# Patient Record
Sex: FEMALE | Race: WHITE | HISPANIC OR LATINO | Employment: OTHER | URBAN - METROPOLITAN AREA
[De-identification: names, ages, dates, MRNs, and addresses within clinical notes are randomized per-mention and may not be internally consistent; named-entity substitution may affect disease eponyms.]

---

## 2023-10-24 ENCOUNTER — HOSPITAL ENCOUNTER (OUTPATIENT)
Dept: RADIOLOGY | Facility: EXTERNAL LOCATION | Age: 68
Discharge: HOME | End: 2023-10-24

## 2023-10-24 ENCOUNTER — OFFICE VISIT (OUTPATIENT)
Dept: PRIMARY CARE | Facility: CLINIC | Age: 68
End: 2023-10-24
Payer: MEDICARE

## 2023-10-24 VITALS
HEIGHT: 64 IN | DIASTOLIC BLOOD PRESSURE: 67 MMHG | SYSTOLIC BLOOD PRESSURE: 109 MMHG | TEMPERATURE: 96.9 F | HEART RATE: 83 BPM | OXYGEN SATURATION: 93 % | WEIGHT: 176.6 LBS | BODY MASS INDEX: 30.15 KG/M2

## 2023-10-24 DIAGNOSIS — C54.1 ENDOMETRIAL CANCER (MULTI): ICD-10-CM

## 2023-10-24 DIAGNOSIS — R63.4 WEIGHT LOSS: ICD-10-CM

## 2023-10-24 DIAGNOSIS — E78.2 MIXED HYPERLIPIDEMIA: ICD-10-CM

## 2023-10-24 DIAGNOSIS — Z12.31 BREAST CANCER SCREENING BY MAMMOGRAM: ICD-10-CM

## 2023-10-24 DIAGNOSIS — R04.0 EPISTAXIS: Primary | ICD-10-CM

## 2023-10-24 DIAGNOSIS — R04.0 EPISTAXIS, RECURRENT: ICD-10-CM

## 2023-10-24 DIAGNOSIS — Z00.00 GENERAL MEDICAL EXAM: ICD-10-CM

## 2023-10-24 DIAGNOSIS — F41.9 ANXIETY: ICD-10-CM

## 2023-10-24 DIAGNOSIS — Z23 ENCOUNTER FOR IMMUNIZATION: ICD-10-CM

## 2023-10-24 DIAGNOSIS — K21.9 GASTROESOPHAGEAL REFLUX DISEASE, UNSPECIFIED WHETHER ESOPHAGITIS PRESENT: ICD-10-CM

## 2023-10-24 DIAGNOSIS — E55.9 VITAMIN D DEFICIENCY: ICD-10-CM

## 2023-10-24 DIAGNOSIS — I10 HYPERTENSION, UNSPECIFIED TYPE: ICD-10-CM

## 2023-10-24 PROBLEM — C55 UTERINE CANCER (MULTI): Status: RESOLVED | Noted: 2023-10-24 | Resolved: 2023-10-24

## 2023-10-24 PROCEDURE — 1160F RVW MEDS BY RX/DR IN RCRD: CPT | Performed by: NURSE PRACTITIONER

## 2023-10-24 PROCEDURE — 90662 IIV NO PRSV INCREASED AG IM: CPT | Performed by: NURSE PRACTITIONER

## 2023-10-24 PROCEDURE — 3078F DIAST BP <80 MM HG: CPT | Performed by: NURSE PRACTITIONER

## 2023-10-24 PROCEDURE — 1036F TOBACCO NON-USER: CPT | Performed by: NURSE PRACTITIONER

## 2023-10-24 PROCEDURE — 3074F SYST BP LT 130 MM HG: CPT | Performed by: NURSE PRACTITIONER

## 2023-10-24 PROCEDURE — 99214 OFFICE O/P EST MOD 30 MIN: CPT | Performed by: NURSE PRACTITIONER

## 2023-10-24 PROCEDURE — G0008 ADMIN INFLUENZA VIRUS VAC: HCPCS | Performed by: NURSE PRACTITIONER

## 2023-10-24 PROCEDURE — 1159F MED LIST DOCD IN RCRD: CPT | Performed by: NURSE PRACTITIONER

## 2023-10-24 RX ORDER — CALCIUM CARBONATE 200(500)MG
1 TABLET,CHEWABLE ORAL DAILY
COMMUNITY

## 2023-10-24 RX ORDER — NITROGLYCERIN 0.4 MG/1
0.4 TABLET SUBLINGUAL EVERY 5 MIN PRN
COMMUNITY
Start: 2017-02-08 | End: 2023-12-05 | Stop reason: ALTCHOICE

## 2023-10-24 RX ORDER — AMLODIPINE BESYLATE 5 MG/1
5 TABLET ORAL DAILY
COMMUNITY
Start: 2010-04-28

## 2023-10-24 RX ORDER — PHENOL/SODIUM PHENOLATE
20 AEROSOL, SPRAY (ML) MUCOUS MEMBRANE
COMMUNITY
Start: 2012-08-23

## 2023-10-24 RX ORDER — SPIRONOLACTONE 25 MG
TABLET ORAL
COMMUNITY

## 2023-10-24 RX ORDER — CETIRIZINE HYDROCHLORIDE 5 MG/1
10 TABLET, CHEWABLE ORAL DAILY
COMMUNITY
Start: 2010-12-09

## 2023-10-24 RX ORDER — ASPIRIN 81 MG/1
TABLET ORAL
COMMUNITY
Start: 2013-10-22 | End: 2023-10-24 | Stop reason: SINTOL

## 2023-10-24 RX ORDER — SERTRALINE HYDROCHLORIDE 25 MG/1
25 TABLET, FILM COATED ORAL DAILY
COMMUNITY

## 2023-10-24 RX ORDER — ATORVASTATIN CALCIUM 10 MG/1
10 TABLET, FILM COATED ORAL DAILY
COMMUNITY
Start: 2012-08-23

## 2023-10-24 ASSESSMENT — ENCOUNTER SYMPTOMS
NERVOUS/ANXIOUS: 0
COUGH: 0
WOUND: 0
PALPITATIONS: 0
DIARRHEA: 0
FREQUENCY: 0
DYSURIA: 0
EYE ITCHING: 0
ABDOMINAL DISTENTION: 0
APPETITE CHANGE: 0
WHEEZING: 0
HEMATURIA: 0
CONSTIPATION: 0
SHORTNESS OF BREATH: 0
VOMITING: 0
ABDOMINAL PAIN: 0
NUMBNESS: 0
EYE PAIN: 0
ACTIVITY CHANGE: 0
ARTHRALGIAS: 0

## 2023-10-24 ASSESSMENT — PATIENT HEALTH QUESTIONNAIRE - PHQ9
2. FEELING DOWN, DEPRESSED OR HOPELESS: NOT AT ALL
SUM OF ALL RESPONSES TO PHQ9 QUESTIONS 1 AND 2: 0
1. LITTLE INTEREST OR PLEASURE IN DOING THINGS: NOT AT ALL

## 2023-10-24 NOTE — ASSESSMENT & PLAN NOTE
Managed by Dr Olmstead  Will obtain record of most recent note and blood work. Believes she had recent lipid panel   Cont on atorvastatin (rx managed by Cards)

## 2023-10-24 NOTE — PROGRESS NOTES
Dayan Lopez female   1955 67 y.o.   [id number]    Chief Complaint  Establish Care.    History Of Present Illness  Dayan Lopez is a 67 y.o. female presents today to establish care.   HPI  Acute concerns today:     Nose bleeds  10/14 - 2 nose bleeds stopped w packing and vaseline  Tuesday bleeding again-- went to ED.  Stopped by the time provider came in.    Advised to use Afrin nasal spray on cotton ball. Given clamps.       ENT- Oziel- NP.  Cautarized Friday.  Sunday bleeding again.   In ED again -- managed to stop it.    ENT again today-- advised to use saline. Follow up in 2 weeks again if bleeds recur.   Has    cautarized L nostrl     Cards  Hx of MI - was on Plavix in past   Dr Olmstead - due in May 2024   Amlodipine   Atorvastatin   Asa- on hold while nose bleeds -- may restart later   Sertraline     GI   GERD Omeprazole   States has a hiatal hernia .    Dr Walker - Located within [facility] gastroenterology   Last colonoscopy not due x 7 years.     Chronic conditions reviewed:     Review of Systems  Review of Systems   Constitutional:  Negative for activity change and appetite change.   HENT:  Negative for congestion.    Eyes:  Negative for pain and itching.   Respiratory:  Negative for cough, shortness of breath and wheezing.    Cardiovascular:  Negative for chest pain, palpitations and leg swelling.   Gastrointestinal:  Negative for abdominal distention, abdominal pain, constipation, diarrhea and vomiting.   Genitourinary:  Negative for dysuria, frequency, hematuria and urgency.   Musculoskeletal:  Negative for arthralgias and gait problem.   Skin:  Negative for rash and wound.   Neurological:  Negative for numbness.   Psychiatric/Behavioral:  The patient is not nervous/anxious.        Diet:  Mostly eats at home.  Sometimes out.  Going to [facility] - lost 14 lbs over 6 mo.   Decaf coffee. Salads, yogurt.    Exercise: does not due routine physical activity.    [facility]- visits twice per year    Ophtho: Dr Duncan - macular degeneration- annual checks. Wears glasses. Takes vitamins      Screenings:    Mammo- Due   Colonoscopy- up to date   Immunizations: flu- will get today   Tdap-due   Covid- recommended   Shingles- 2012.   Due for shingles vaccine 2 shot series  Pneumonia - due.     Past Medical History  She has a past medical history of Old myocardial infarction, Personal history of malignant neoplasm of other parts of uterus, Personal history of other diseases of the digestive system, and Personal history of other specified conditions.    Surgical History  She has a past surgical history that includes Hysterectomy (12/05/2014); Other surgical history (12/05/2014); and Other surgical history (12/05/2014).    Family History  Family History   Problem Relation Name Age of Onset    Heart disease Mother      Liver cancer Father      Heart disease Sister      Heart disease Brother      Cancer Brother          Social History  She reports that she has never smoked. She has never used smokeless tobacco. She reports that she does not currently use alcohol. She reports that she does not currently use drugs.    Allergies  Meperidine and Meperidine (pf)    Medications  Current Outpatient Medications   Medication Instructions    amLODIPine (NORVASC) 5 mg, oral, Daily    aspirin (Ecotrin Low Strength) 81 mg EC tablet oral    atorvastatin (LIPITOR) 10 mg, oral, Daily    calcium carbonate (Tums) 200 mg calcium chewable tablet 1 tablet, oral, Daily    cetirizine (ZYRTEC) 10 mg, oral, Daily    lutein 20 mg capsule oral    nitroglycerin (NITROSTAT) 0.4 mg, sublingual, Every 5 min PRN    omeprazole (PRILOSEC) 20 mg, oral, Daily before breakfast    sertraline (ZOLOFT) 25 mg, oral, Daily        Objective   /67   Pulse 83   Temp 36.1 °C (96.9 °F)   Ht 1.613 m (5' 3.5\")   Wt 80.1 kg (176 lb 9.6 oz)   SpO2 93%   BMI 30.79 kg/m²    BMI: Estimated body mass " "index is 30.79 kg/m² as calculated from the following:    Height as of this encounter: 1.613 m (5' 3.5\").    Weight as of this encounter: 80.1 kg (176 lb 9.6 oz).    Physical Exam  Physical Exam  Vitals and nursing note reviewed.   Constitutional:       Appearance: Normal appearance.   HENT:      Head: Normocephalic and atraumatic.      Nose: Nose normal.      Mouth/Throat:      Mouth: Mucous membranes are moist.      Pharynx: Oropharynx is clear.   Eyes:      Extraocular Movements: Extraocular movements intact.      Conjunctiva/sclera: Conjunctivae normal.      Pupils: Pupils are equal, round, and reactive to light.   Cardiovascular:      Rate and Rhythm: Normal rate and regular rhythm.      Pulses: Normal pulses.      Heart sounds: Normal heart sounds.   Pulmonary:      Effort: Pulmonary effort is normal.      Breath sounds: Normal breath sounds.   Abdominal:      General: Bowel sounds are normal.      Palpations: Abdomen is soft.      Tenderness: There is no abdominal tenderness.   Musculoskeletal:         General: Normal range of motion.      Cervical back: Neck supple.   Skin:     General: Skin is warm and dry.   Neurological:      General: No focal deficit present.      Mental Status: She is alert and oriented to person, place, and time. Mental status is at baseline.   Psychiatric:         Mood and Affect: Mood normal.         Behavior: Behavior normal.         Thought Content: Thought content normal.         Judgment: Judgment normal.         Relevant Results and Imaging  No visits with results within 1 Year(s) from this visit.   Latest known visit with results is:   No results found for any previous visit.     No images are attached to the encounter.        Assessment and Plan  Assessment/Plan   Problem List Items Addressed This Visit             ICD-10-CM    Hyperlipidemia E78.5     Managed by Dr Olmstead  Will obtain record of most recent note and blood work. Believes she had recent lipid panel   Cont on " atorvastatin (rx managed by Cards)         Relevant Orders    Comprehensive Metabolic Panel    RESOLVED: Endometrial cancer (CMS/HCC) C54.1    Acid reflux K21.9     Records requested from dr mcmahon            Epistaxis, recurrent R04.0    Anxiety F41.9     Cont on sertraline 25 daily         HTN (hypertension) I10     Chronic, stable   Cont on amlodipine   (rx managed by Cards)          Other Visit Diagnoses         Codes    Epistaxis    -  Primary R04.0    cbc w diff ordered     Relevant Orders    CBC and Auto Differential    Breast cancer screening by mammogram     Z12.31    Relevant Orders    BI mammo bilateral screening tomosynthesis (Completed)    Encounter for immunization     Z23    Relevant Orders    Flu vaccine, quadrivalent, high-dose, preservative free, age 65y+ (FLUZONE) (Completed)    Weight loss     R63.4    Relevant Orders    Tsh With Reflex To Free T4 If Abnormal    General medical exam     Z00.00    Relevant Orders    Follow Up In Primary Care - Medicare Annual

## 2023-10-25 ENCOUNTER — LAB (OUTPATIENT)
Dept: LAB | Facility: LAB | Age: 68
End: 2023-10-25
Payer: MEDICARE

## 2023-10-25 DIAGNOSIS — E78.2 MIXED HYPERLIPIDEMIA: ICD-10-CM

## 2023-10-25 DIAGNOSIS — R04.0 EPISTAXIS: ICD-10-CM

## 2023-10-25 DIAGNOSIS — R63.4 WEIGHT LOSS: ICD-10-CM

## 2023-10-25 DIAGNOSIS — E55.9 VITAMIN D DEFICIENCY: ICD-10-CM

## 2023-10-25 PROBLEM — H35.363 DRUSEN (DEGENERATIVE) OF MACULA, BILATERAL: Status: ACTIVE | Noted: 2023-10-25

## 2023-10-25 LAB
25(OH)D3 SERPL-MCNC: 35 NG/ML (ref 30–100)
ALBUMIN SERPL BCP-MCNC: 4.4 G/DL (ref 3.4–5)
ALP SERPL-CCNC: 72 U/L (ref 33–136)
ALT SERPL W P-5'-P-CCNC: 14 U/L (ref 7–45)
ANION GAP SERPL CALC-SCNC: 17 MMOL/L (ref 10–20)
AST SERPL W P-5'-P-CCNC: 17 U/L (ref 9–39)
BASOPHILS # BLD AUTO: 0.04 X10*3/UL (ref 0–0.1)
BASOPHILS NFR BLD AUTO: 0.5 %
BILIRUB SERPL-MCNC: 0.8 MG/DL (ref 0–1.2)
BUN SERPL-MCNC: 14 MG/DL (ref 6–23)
CALCIUM SERPL-MCNC: 10 MG/DL (ref 8.6–10.6)
CHLORIDE SERPL-SCNC: 100 MMOL/L (ref 98–107)
CO2 SERPL-SCNC: 28 MMOL/L (ref 21–32)
CREAT SERPL-MCNC: 0.81 MG/DL (ref 0.5–1.05)
EOSINOPHIL # BLD AUTO: 0.42 X10*3/UL (ref 0–0.7)
EOSINOPHIL NFR BLD AUTO: 5.4 %
ERYTHROCYTE [DISTWIDTH] IN BLOOD BY AUTOMATED COUNT: 15.7 % (ref 11.5–14.5)
GFR SERPL CREATININE-BSD FRML MDRD: 80 ML/MIN/1.73M*2
GLUCOSE SERPL-MCNC: 113 MG/DL (ref 74–99)
HCT VFR BLD AUTO: 43.3 % (ref 36–46)
HGB BLD-MCNC: 12.8 G/DL (ref 12–16)
IMM GRANULOCYTES # BLD AUTO: 0.02 X10*3/UL (ref 0–0.7)
IMM GRANULOCYTES NFR BLD AUTO: 0.3 % (ref 0–0.9)
LYMPHOCYTES # BLD AUTO: 1.18 X10*3/UL (ref 1.2–4.8)
LYMPHOCYTES NFR BLD AUTO: 15.1 %
MCH RBC QN AUTO: 24.9 PG (ref 26–34)
MCHC RBC AUTO-ENTMCNC: 29.6 G/DL (ref 32–36)
MCV RBC AUTO: 84 FL (ref 80–100)
MONOCYTES # BLD AUTO: 0.52 X10*3/UL (ref 0.1–1)
MONOCYTES NFR BLD AUTO: 6.6 %
NEUTROPHILS # BLD AUTO: 5.64 X10*3/UL (ref 1.2–7.7)
NEUTROPHILS NFR BLD AUTO: 72.1 %
NRBC BLD-RTO: 0 /100 WBCS (ref 0–0)
PLATELET # BLD AUTO: 377 X10*3/UL (ref 150–450)
PMV BLD AUTO: 11.2 FL (ref 7.5–11.5)
POTASSIUM SERPL-SCNC: 4 MMOL/L (ref 3.5–5.3)
PROT SERPL-MCNC: 7.4 G/DL (ref 6.4–8.2)
RBC # BLD AUTO: 5.15 X10*6/UL (ref 4–5.2)
SODIUM SERPL-SCNC: 141 MMOL/L (ref 136–145)
T4 FREE SERPL-MCNC: 1.04 NG/DL (ref 0.78–1.48)
TSH SERPL-ACNC: 4.33 MIU/L (ref 0.44–3.98)
WBC # BLD AUTO: 7.8 X10*3/UL (ref 4.4–11.3)

## 2023-10-25 PROCEDURE — 80053 COMPREHEN METABOLIC PANEL: CPT

## 2023-10-25 PROCEDURE — 84439 ASSAY OF FREE THYROXINE: CPT

## 2023-10-25 PROCEDURE — 85025 COMPLETE CBC W/AUTO DIFF WBC: CPT

## 2023-10-25 PROCEDURE — 84443 ASSAY THYROID STIM HORMONE: CPT

## 2023-10-25 PROCEDURE — 36415 COLL VENOUS BLD VENIPUNCTURE: CPT

## 2023-10-25 PROCEDURE — 82306 VITAMIN D 25 HYDROXY: CPT

## 2023-10-27 DIAGNOSIS — E03.9 HYPOTHYROIDISM, UNSPECIFIED TYPE: Primary | ICD-10-CM

## 2023-10-27 RX ORDER — LEVOTHYROXINE SODIUM 25 UG/1
25 TABLET ORAL DAILY
Qty: 30 TABLET | Refills: 11 | Status: SHIPPED | OUTPATIENT
Start: 2023-10-27 | End: 2024-05-15

## 2023-11-06 ENCOUNTER — TELEPHONE (OUTPATIENT)
Dept: PRIMARY CARE | Facility: CLINIC | Age: 68
End: 2023-11-06
Payer: MEDICARE

## 2023-11-06 DIAGNOSIS — Z12.31 BREAST CANCER SCREENING BY MAMMOGRAM: Primary | ICD-10-CM

## 2023-11-06 NOTE — TELEPHONE ENCOUNTER
Can you put in a new order for mammogram.  Radiology is trying to schedule and it says it was already scheduled for today.

## 2023-11-06 NOTE — TELEPHONE ENCOUNTER
Patient went to go schedule her mammogram and the order shows complete so she can't schedule it. Can you please reorder the mammogram so that the radiologist can order the mammogram please. Thanks.

## 2023-11-15 ENCOUNTER — ANCILLARY PROCEDURE (OUTPATIENT)
Dept: RADIOLOGY | Facility: CLINIC | Age: 68
End: 2023-11-15
Payer: MEDICARE

## 2023-11-15 DIAGNOSIS — Z12.31 ENCOUNTER FOR SCREENING MAMMOGRAM FOR MALIGNANT NEOPLASM OF BREAST: ICD-10-CM

## 2023-11-15 PROCEDURE — 77063 BREAST TOMOSYNTHESIS BI: CPT | Performed by: RADIOLOGY

## 2023-11-15 PROCEDURE — 77063 BREAST TOMOSYNTHESIS BI: CPT

## 2023-11-15 PROCEDURE — 77067 SCR MAMMO BI INCL CAD: CPT | Performed by: RADIOLOGY

## 2023-11-16 ENCOUNTER — HOSPITAL ENCOUNTER (OUTPATIENT)
Dept: RADIOLOGY | Facility: EXTERNAL LOCATION | Age: 68
Discharge: HOME | End: 2023-11-16

## 2023-11-27 ENCOUNTER — OFFICE VISIT (OUTPATIENT)
Dept: PRIMARY CARE | Facility: CLINIC | Age: 68
End: 2023-11-27
Payer: MEDICARE

## 2023-11-27 VITALS
OXYGEN SATURATION: 98 % | SYSTOLIC BLOOD PRESSURE: 109 MMHG | HEIGHT: 64 IN | HEART RATE: 83 BPM | WEIGHT: 175 LBS | DIASTOLIC BLOOD PRESSURE: 74 MMHG | BODY MASS INDEX: 29.88 KG/M2 | TEMPERATURE: 97.8 F

## 2023-11-27 DIAGNOSIS — Z00.00 MEDICARE ANNUAL WELLNESS VISIT, INITIAL: ICD-10-CM

## 2023-11-27 DIAGNOSIS — M25.551 BILATERAL HIP PAIN: ICD-10-CM

## 2023-11-27 DIAGNOSIS — M25.552 BILATERAL HIP PAIN: ICD-10-CM

## 2023-11-27 DIAGNOSIS — E03.9 ACQUIRED HYPOTHYROIDISM: Primary | ICD-10-CM

## 2023-11-27 PROCEDURE — 99213 OFFICE O/P EST LOW 20 MIN: CPT | Performed by: NURSE PRACTITIONER

## 2023-11-27 PROCEDURE — 1036F TOBACCO NON-USER: CPT | Performed by: NURSE PRACTITIONER

## 2023-11-27 PROCEDURE — 1160F RVW MEDS BY RX/DR IN RCRD: CPT | Performed by: NURSE PRACTITIONER

## 2023-11-27 PROCEDURE — 3078F DIAST BP <80 MM HG: CPT | Performed by: NURSE PRACTITIONER

## 2023-11-27 PROCEDURE — 3074F SYST BP LT 130 MM HG: CPT | Performed by: NURSE PRACTITIONER

## 2023-11-27 PROCEDURE — 1159F MED LIST DOCD IN RCRD: CPT | Performed by: NURSE PRACTITIONER

## 2023-11-27 PROCEDURE — 1125F AMNT PAIN NOTED PAIN PRSNT: CPT | Performed by: NURSE PRACTITIONER

## 2023-11-27 ASSESSMENT — PAIN SCALES - GENERAL: PAINLEVEL: 2

## 2023-11-27 ASSESSMENT — PATIENT HEALTH QUESTIONNAIRE - PHQ9
2. FEELING DOWN, DEPRESSED OR HOPELESS: NOT AT ALL
1. LITTLE INTEREST OR PLEASURE IN DOING THINGS: NOT AT ALL
SUM OF ALL RESPONSES TO PHQ9 QUESTIONS 1 AND 2: 0

## 2023-11-27 NOTE — PROGRESS NOTES
Chief Complaint  Follow-up (Follow up on thyroid ).    History Of Present Illness  Dayan Lopez is a 68 y.o. female presents today for Follow-up pf hypothyrpid.  Reviewed recent labs-TSH, free T4.  Has been taking levothyroxine x 1 month. Tolerating well.  Takes early in AM w glass of water 1 hr before other medications and food.     Hip pain - wakes up in AM with pain.  States has been sleeping on sides.   Pain improves later in day.        Review of Systems  Review of Systems   Constitutional:  Negative for activity change and appetite change.   Eyes:  Negative for pain and itching.   Respiratory:  Negative for cough, shortness of breath and wheezing.    Cardiovascular:  Negative for chest pain, palpitations and leg swelling.   Gastrointestinal:  Negative for abdominal distention, abdominal pain, constipation, diarrhea and vomiting.   Genitourinary:  Negative for dysuria, frequency, hematuria and urgency.   Musculoskeletal:  Positive for arthralgias. Negative for gait problem.   Skin:  Negative for rash and wound.   Neurological:  Negative for numbness.   Psychiatric/Behavioral:  The patient is not nervous/anxious.        Past Medical History  She has a past medical history of Benign paroxysmal positional vertigo (07/28/2009), COVID, Endometrial cancer (CMS/[facility]) (10/24/2023), MI (myocardial infarction) (CMS/[facility]) (03/18/2013), Old myocardial infarction, Personal history of malignant neoplasm of other parts of uterus, Personal history of other diseases of the digestive system, Personal history of other specified conditions, and Uterine cancer (CMS/[facility]) (10/24/2023).    Surgical History  She has a past surgical history that includes Hysterectomy (12/05/2014); Other surgical history (12/05/2014); Other surgical history (12/05/2014); Colonoscopy (10/11/2019); and Coronary angioplasty with stent (2007).    Family History  Family History   Problem Relation Name Age of Onset    Heart disease Mother      Osteoporosis  "Mother      Liver cancer Father      Diabetes Father      Heart disease Sister      Heart disease Brother      Cancer Brother          hpv oral        Social History  She reports that she has never smoked. She has never used smokeless tobacco. She reports that she does not currently use alcohol. She reports that she does not currently use drugs.    Allergies  Meperidine and Meperidine (pf)    Medications  Current Outpatient Medications   Medication Instructions    amLODIPine (NORVASC) 5 mg, oral, Daily    atorvastatin (LIPITOR) 10 mg, oral, Daily    calcium carbonate (Tums) 200 mg calcium chewable tablet 1 tablet, oral, Daily    cetirizine (ZYRTEC) 10 mg, oral, Daily    levothyroxine (SYNTHROID, LEVOXYL) 25 mcg, oral, Daily    lutein 20 mg capsule oral    nitroglycerin (NITROSTAT) 0.4 mg, sublingual, Every 5 min PRN    omeprazole (PRILOSEC) 20 mg, oral, Daily before breakfast    sertraline (ZOLOFT) 25 mg, oral, Daily        Objective   /74   Pulse 83   Temp 36.6 °C (97.8 °F) (Temporal)   Ht 1.613 m (5' 3.5\")   Wt 79.4 kg (175 lb)   SpO2 98%   BMI 30.51 kg/m²    BMI: Estimated body mass index is 30.51 kg/m² as calculated from the following:    Height as of this encounter: 1.613 m (5' 3.5\").    Weight as of this encounter: 79.4 kg (175 lb).    Physical Exam  Physical Exam  Vitals and nursing note reviewed.   Constitutional:       Appearance: Normal appearance.   HENT:      Head: Normocephalic and atraumatic.      Nose: Nose normal.      Mouth/Throat:      Mouth: Mucous membranes are moist.      Pharynx: Oropharynx is clear.   Eyes:      Extraocular Movements: Extraocular movements intact.      Conjunctiva/sclera: Conjunctivae normal.      Pupils: Pupils are equal, round, and reactive to light.   Cardiovascular:      Rate and Rhythm: Normal rate and regular rhythm.      Pulses: Normal pulses.      Heart sounds: Normal heart sounds.   Pulmonary:      Effort: Pulmonary effort is normal.      Breath sounds: " Normal breath sounds.   Abdominal:      General: Bowel sounds are normal.      Palpations: Abdomen is soft.      Tenderness: There is no abdominal tenderness.   Musculoskeletal:         General: Normal range of motion.      Cervical back: Neck supple.   Skin:     General: Skin is warm and dry.   Neurological:      General: No focal deficit present.      Mental Status: She is alert and oriented to person, place, and time. Mental status is at baseline.   Psychiatric:         Mood and Affect: Mood normal.         Behavior: Behavior normal.         Thought Content: Thought content normal.         Judgment: Judgment normal.         Relevant Results and Imaging  Lab on 10/25/2023   Component Date Value Ref Range Status    WBC 10/25/2023 7.8  4.4 - 11.3 x10*3/uL Final    nRBC 10/25/2023 0.0  0.0 - 0.0 /100 WBCs Final    RBC 10/25/2023 5.15  4.00 - 5.20 x10*6/uL Final    Hemoglobin 10/25/2023 12.8  12.0 - 16.0 g/dL Final    Hematocrit 10/25/2023 43.3  36.0 - 46.0 % Final    MCV 10/25/2023 84  80 - 100 fL Final    MCH 10/25/2023 24.9 (L)  26.0 - 34.0 pg Final    MCHC 10/25/2023 29.6 (L)  32.0 - 36.0 g/dL Final    RDW 10/25/2023 15.7 (H)  11.5 - 14.5 % Final    Platelets 10/25/2023 377  150 - 450 x10*3/uL Final    MPV 10/25/2023 11.2  7.5 - 11.5 fL Final    Neutrophils % 10/25/2023 72.1  40.0 - 80.0 % Final    Immature Granulocytes %, Automated 10/25/2023 0.3  0.0 - 0.9 % Final    Immature Granulocyte Count (IG) includes promyelocytes, myelocytes and metamyelocytes but does not include bands. Percent differential counts (%) should be interpreted in the context of the absolute cell counts (cells/UL).    Lymphocytes % 10/25/2023 15.1  13.0 - 44.0 % Final    Monocytes % 10/25/2023 6.6  2.0 - 10.0 % Final    Eosinophils % 10/25/2023 5.4  0.0 - 6.0 % Final    Basophils % 10/25/2023 0.5  0.0 - 2.0 % Final    Neutrophils Absolute 10/25/2023 5.64  1.20 - 7.70 x10*3/uL Final    Percent differential counts (%) should be interpreted in  the context of the absolute cell counts (cells/uL).    Immature Granulocytes Absolute, Au* 10/25/2023 0.02  0.00 - 0.70 x10*3/uL Final    Lymphocytes Absolute 10/25/2023 1.18 (L)  1.20 - 4.80 x10*3/uL Final    Monocytes Absolute 10/25/2023 0.52  0.10 - 1.00 x10*3/uL Final    Eosinophils Absolute 10/25/2023 0.42  0.00 - 0.70 x10*3/uL Final    Basophils Absolute 10/25/2023 0.04  0.00 - 0.10 x10*3/uL Final    Glucose 10/25/2023 113 (H)  74 - 99 mg/dL Final    Sodium 10/25/2023 141  136 - 145 mmol/L Final    Potassium 10/25/2023 4.0  3.5 - 5.3 mmol/L Final    Chloride 10/25/2023 100  98 - 107 mmol/L Final    Bicarbonate 10/25/2023 28  21 - 32 mmol/L Final    Anion Gap 10/25/2023 17  10 - 20 mmol/L Final    Urea Nitrogen 10/25/2023 14  6 - 23 mg/dL Final    Creatinine 10/25/2023 0.81  0.50 - 1.05 mg/dL Final    eGFR 10/25/2023 80  >60 mL/min/1.73m*2 Final    Calculations of estimated GFR are performed using the 2021 CKD-EPI Study Refit equation without the race variable for the IDMS-Traceable creatinine methods.  https://jasn.asnjournals.org/content/early/2021/09/22/ASN.5621973593    Calcium 10/25/2023 10.0  8.6 - 10.6 mg/dL Final    Albumin 10/25/2023 4.4  3.4 - 5.0 g/dL Final    Alkaline Phosphatase 10/25/2023 72  33 - 136 U/L Final    Total Protein 10/25/2023 7.4  6.4 - 8.2 g/dL Final    AST 10/25/2023 17  9 - 39 U/L Final    Bilirubin, Total 10/25/2023 0.8  0.0 - 1.2 mg/dL Final    ALT 10/25/2023 14  7 - 45 U/L Final    Patients treated with Sulfasalazine may generate falsely decreased results for ALT.    Thyroid Stimulating Hormone 10/25/2023 4.33 (H)  0.44 - 3.98 mIU/L Final    Vitamin D, 25-Hydroxy, Total 10/25/2023 35  30 - 100 ng/mL Final    Thyroxine, Free 10/25/2023 1.04  0.78 - 1.48 ng/dL Final     No images are attached to the encounter.        Assessment and Plan  Assessment/Plan   Problem List Items Addressed This Visit             ICD-10-CM    Acquired hypothyroidism - Primary E03.9     10/2023 started  levothyroxine.  Plan to obtain TSH 12/8/23 and eval for dose adjustment at that time          Relevant Orders    Tsh With Reflex To Free T4 If Abnormal    Follow Up In Primary Care - Medicare Annual     Other Visit Diagnoses         Codes    Medicare annual wellness visit, initial     Z00.00    Relevant Orders    Follow Up In Primary Care - Medicare Annual    Bilateral hip pain     M25.551, M25.552    -sleep w body pillow between legs to improve allignment/decrease strain. Tylenol prn.  if pain persists please follow up and we will obtain imaging

## 2023-11-28 PROBLEM — E03.9 ACQUIRED HYPOTHYROIDISM: Status: ACTIVE | Noted: 2023-11-28

## 2023-11-28 ASSESSMENT — ENCOUNTER SYMPTOMS
NUMBNESS: 0
COUGH: 0
NERVOUS/ANXIOUS: 0
EYE ITCHING: 0
WOUND: 0
FREQUENCY: 0
SHORTNESS OF BREATH: 0
WHEEZING: 0
ABDOMINAL PAIN: 0
PALPITATIONS: 0
CONSTIPATION: 0
EYE PAIN: 0
ACTIVITY CHANGE: 0
DIARRHEA: 0
ARTHRALGIAS: 1
HEMATURIA: 0
APPETITE CHANGE: 0
DYSURIA: 0
VOMITING: 0
ABDOMINAL DISTENTION: 0

## 2023-11-28 NOTE — ASSESSMENT & PLAN NOTE
10/2023 started levothyroxine.  Plan to obtain TSH 12/8/23 and eval for dose adjustment at that time

## 2023-11-29 ENCOUNTER — TELEPHONE (OUTPATIENT)
Dept: PRIMARY CARE | Facility: CLINIC | Age: 68
End: 2023-11-29
Payer: MEDICARE

## 2023-11-29 NOTE — TELEPHONE ENCOUNTER
----- Message from MEMO Broussard sent at 11/29/2023 10:15 AM EST -----  Mammogram reviewed- please advise:    There is no mammographic evidence of malignancy.  It is reported that you have dense breast tissue.  Please keep in mind that identifying small masses/abnormalities can be more difficult if the tissue is dense.  If you have any concerns or would like further investigation we can order an ultrasound or MRI of your breasts, but these are unlikely to be covered by your insurance.  Current recommendation is to repeat mammogram in 1 year.     Please document this note into patients chart.  ----- Message -----  From: Marcella Scott CMA  Sent: 11/28/2023   3:56 PM EST  To: MEMO Broussard

## 2023-11-30 ASSESSMENT — ENCOUNTER SYMPTOMS
VOMITING: 0
NECK PAIN: 0
COUGH: 1
ABDOMINAL PAIN: 0
SORE THROAT: 1
DIARRHEA: 0
SWOLLEN GLANDS: 1
HOARSE VOICE: 1
SHORTNESS OF BREATH: 0
STRIDOR: 1
HEADACHES: 0
TROUBLE SWALLOWING: 0

## 2023-12-01 ENCOUNTER — LAB (OUTPATIENT)
Dept: LAB | Facility: LAB | Age: 68
End: 2023-12-01
Payer: MEDICARE

## 2023-12-01 ENCOUNTER — OFFICE VISIT (OUTPATIENT)
Dept: PRIMARY CARE | Facility: CLINIC | Age: 68
End: 2023-12-01
Payer: MEDICARE

## 2023-12-01 VITALS
DIASTOLIC BLOOD PRESSURE: 70 MMHG | TEMPERATURE: 98.1 F | HEIGHT: 64 IN | HEART RATE: 84 BPM | BODY MASS INDEX: 29.88 KG/M2 | SYSTOLIC BLOOD PRESSURE: 106 MMHG | WEIGHT: 175 LBS | OXYGEN SATURATION: 96 %

## 2023-12-01 DIAGNOSIS — J06.9 UPPER RESPIRATORY TRACT INFECTION, UNSPECIFIED TYPE: Primary | ICD-10-CM

## 2023-12-01 DIAGNOSIS — J06.9 UPPER RESPIRATORY TRACT INFECTION, UNSPECIFIED TYPE: ICD-10-CM

## 2023-12-01 LAB — SARS-COV-2 ORF1AB RESP QL NAA+PROBE: NOT DETECTED

## 2023-12-01 PROCEDURE — 1159F MED LIST DOCD IN RCRD: CPT | Performed by: NURSE PRACTITIONER

## 2023-12-01 PROCEDURE — 99213 OFFICE O/P EST LOW 20 MIN: CPT | Performed by: NURSE PRACTITIONER

## 2023-12-01 PROCEDURE — 3074F SYST BP LT 130 MM HG: CPT | Performed by: NURSE PRACTITIONER

## 2023-12-01 PROCEDURE — 1036F TOBACCO NON-USER: CPT | Performed by: NURSE PRACTITIONER

## 2023-12-01 PROCEDURE — 3078F DIAST BP <80 MM HG: CPT | Performed by: NURSE PRACTITIONER

## 2023-12-01 PROCEDURE — 87635 SARS-COV-2 COVID-19 AMP PRB: CPT

## 2023-12-01 PROCEDURE — 1126F AMNT PAIN NOTED NONE PRSNT: CPT | Performed by: NURSE PRACTITIONER

## 2023-12-01 PROCEDURE — 1160F RVW MEDS BY RX/DR IN RCRD: CPT | Performed by: NURSE PRACTITIONER

## 2023-12-01 RX ORDER — FLUTICASONE PROPIONATE 50 MCG
1 SPRAY, SUSPENSION (ML) NASAL DAILY
Qty: 16 G | Refills: 5 | Status: SHIPPED | OUTPATIENT
Start: 2023-12-01 | End: 2024-11-30

## 2023-12-01 RX ORDER — AZITHROMYCIN 250 MG/1
TABLET, FILM COATED ORAL
Qty: 6 TABLET | Refills: 0 | Status: SHIPPED | OUTPATIENT
Start: 2023-12-01

## 2023-12-01 ASSESSMENT — ENCOUNTER SYMPTOMS
COUGH: 1
NECK PAIN: 0
SWOLLEN GLANDS: 1
SHORTNESS OF BREATH: 0
SORE THROAT: 1
WHEEZING: 1
VOMITING: 0
HOARSE VOICE: 1
TROUBLE SWALLOWING: 0
ABDOMINAL PAIN: 0
STRIDOR: 0
HEADACHES: 0
DIARRHEA: 0

## 2023-12-01 ASSESSMENT — PAIN SCALES - GENERAL: PAINLEVEL: 0-NO PAIN

## 2023-12-01 NOTE — PROGRESS NOTES
Chief Complaint  Sinus Problem.    History Of Present Illness  Dayan Lopez is a 68 y.o. female presents today for follow up of Sinus Problem.  Sore Throat   This is a new problem. The current episode started in the past 7 days. The problem has been gradually worsening. The pain is worse on the left side. The pain is at a severity of 5/10. Associated symptoms include congestion, coughing, a hoarse voice and swollen glands. Pertinent negatives include no abdominal pain, diarrhea, drooling, ear discharge, ear pain, headaches, plugged ear sensation, neck pain, shortness of breath, stridor, trouble swallowing or vomiting. She has had no exposure to strep or mono.       Attended birthday party on Saturday, on Wednesday started w symptoms    Other people from party also sick.   C/o Chills. Congested, sore throat. No headache, no fever, no nausea or vomiting, no diarrhea.   States feels like sinus infection.     At home so far has tried Robutussin DM, tylenol.     Tested for COVID on Wednesday- negative    Review of Systems  Review of Systems   HENT:  Positive for congestion, hoarse voice and sore throat. Negative for drooling, ear discharge, ear pain and trouble swallowing.    Respiratory:  Positive for cough and wheezing. Negative for shortness of breath and stridor.    Gastrointestinal:  Negative for abdominal pain, diarrhea and vomiting.   Musculoskeletal:  Negative for neck pain.   Neurological:  Negative for headaches.       Past Medical History  She has a past medical history of Benign paroxysmal positional vertigo (07/28/2009), COVID, Endometrial cancer (CMS/[facility]) (10/24/2023), MI (myocardial infarction) (CMS/[facility]) (03/18/2013), Old myocardial infarction, Personal history of malignant neoplasm of other parts of uterus, Personal history of other diseases of the digestive system, Personal history of other specified conditions, and Uterine cancer (CMS/[facility]) (10/24/2023).    Surgical History  She has a past surgical  "history that includes Hysterectomy (12/05/2014); Other surgical history (12/05/2014); Other surgical history (12/05/2014); Colonoscopy (10/11/2019); and Coronary angioplasty with stent (2007).    Family History  Family History   Problem Relation Name Age of Onset    Heart disease Mother      Osteoporosis Mother      Liver cancer Father      Diabetes Father      Heart disease Sister      Heart disease Brother      Cancer Brother          hpv oral        Social History  She reports that she has never smoked. She has never used smokeless tobacco. She reports that she does not currently use alcohol. She reports that she does not currently use drugs.    Allergies  Meperidine and Meperidine (pf)    Medications  Current Outpatient Medications   Medication Instructions    amLODIPine (NORVASC) 5 mg, oral, Daily    atorvastatin (LIPITOR) 10 mg, oral, Daily    calcium carbonate (Tums) 200 mg calcium chewable tablet 1 tablet, oral, Daily    cetirizine (ZYRTEC) 10 mg, oral, Daily    levothyroxine (SYNTHROID, LEVOXYL) 25 mcg, oral, Daily    lutein 20 mg capsule oral    nitroglycerin (NITROSTAT) 0.4 mg, sublingual, Every 5 min PRN    omeprazole (PRILOSEC) 20 mg, oral, Daily before breakfast    sertraline (ZOLOFT) 25 mg, oral, Daily        Objective   /70   Pulse 84   Temp 36.7 °C (98.1 °F) (Temporal)   Ht 1.613 m (5' 3.5\")   Wt 79.4 kg (175 lb)   SpO2 96%   BMI 30.51 kg/m²    BMI: Estimated body mass index is 30.51 kg/m² as calculated from the following:    Height as of this encounter: 1.613 m (5' 3.5\").    Weight as of this encounter: 79.4 kg (175 lb).    Physical Exam  Physical Exam  Vitals and nursing note reviewed.   Constitutional:       Appearance: Normal appearance.   HENT:      Head: Normocephalic and atraumatic.      Nose: Nose normal.      Mouth/Throat:      Mouth: Mucous membranes are moist.      Pharynx: Oropharynx is clear.   Eyes:      Extraocular Movements: Extraocular movements intact.      " Conjunctiva/sclera: Conjunctivae normal.      Pupils: Pupils are equal, round, and reactive to light.   Cardiovascular:      Rate and Rhythm: Normal rate and regular rhythm.      Pulses: Normal pulses.      Heart sounds: Normal heart sounds.   Pulmonary:      Effort: Pulmonary effort is normal.      Breath sounds: Normal breath sounds.   Abdominal:      General: Bowel sounds are normal.      Palpations: Abdomen is soft.      Tenderness: There is no abdominal tenderness.   Musculoskeletal:         General: Normal range of motion.      Cervical back: Neck supple.   Skin:     General: Skin is warm and dry.   Neurological:      General: No focal deficit present.      Mental Status: She is alert and oriented to person, place, and time. Mental status is at baseline.   Psychiatric:         Mood and Affect: Mood normal.         Behavior: Behavior normal.         Thought Content: Thought content normal.         Judgment: Judgment normal.         Relevant Results and Imaging  Lab on 10/25/2023   Component Date Value Ref Range Status    WBC 10/25/2023 7.8  4.4 - 11.3 x10*3/uL Final    nRBC 10/25/2023 0.0  0.0 - 0.0 /100 WBCs Final    RBC 10/25/2023 5.15  4.00 - 5.20 x10*6/uL Final    Hemoglobin 10/25/2023 12.8  12.0 - 16.0 g/dL Final    Hematocrit 10/25/2023 43.3  36.0 - 46.0 % Final    MCV 10/25/2023 84  80 - 100 fL Final    MCH 10/25/2023 24.9 (L)  26.0 - 34.0 pg Final    MCHC 10/25/2023 29.6 (L)  32.0 - 36.0 g/dL Final    RDW 10/25/2023 15.7 (H)  11.5 - 14.5 % Final    Platelets 10/25/2023 377  150 - 450 x10*3/uL Final    MPV 10/25/2023 11.2  7.5 - 11.5 fL Final    Neutrophils % 10/25/2023 72.1  40.0 - 80.0 % Final    Immature Granulocytes %, Automated 10/25/2023 0.3  0.0 - 0.9 % Final    Immature Granulocyte Count (IG) includes promyelocytes, myelocytes and metamyelocytes but does not include bands. Percent differential counts (%) should be interpreted in the context of the absolute cell counts (cells/UL).    Lymphocytes %  10/25/2023 15.1  13.0 - 44.0 % Final    Monocytes % 10/25/2023 6.6  2.0 - 10.0 % Final    Eosinophils % 10/25/2023 5.4  0.0 - 6.0 % Final    Basophils % 10/25/2023 0.5  0.0 - 2.0 % Final    Neutrophils Absolute 10/25/2023 5.64  1.20 - 7.70 x10*3/uL Final    Percent differential counts (%) should be interpreted in the context of the absolute cell counts (cells/uL).    Immature Granulocytes Absolute, Au* 10/25/2023 0.02  0.00 - 0.70 x10*3/uL Final    Lymphocytes Absolute 10/25/2023 1.18 (L)  1.20 - 4.80 x10*3/uL Final    Monocytes Absolute 10/25/2023 0.52  0.10 - 1.00 x10*3/uL Final    Eosinophils Absolute 10/25/2023 0.42  0.00 - 0.70 x10*3/uL Final    Basophils Absolute 10/25/2023 0.04  0.00 - 0.10 x10*3/uL Final    Glucose 10/25/2023 113 (H)  74 - 99 mg/dL Final    Sodium 10/25/2023 141  136 - 145 mmol/L Final    Potassium 10/25/2023 4.0  3.5 - 5.3 mmol/L Final    Chloride 10/25/2023 100  98 - 107 mmol/L Final    Bicarbonate 10/25/2023 28  21 - 32 mmol/L Final    Anion Gap 10/25/2023 17  10 - 20 mmol/L Final    Urea Nitrogen 10/25/2023 14  6 - 23 mg/dL Final    Creatinine 10/25/2023 0.81  0.50 - 1.05 mg/dL Final    eGFR 10/25/2023 80  >60 mL/min/1.73m*2 Final    Calculations of estimated GFR are performed using the 2021 CKD-EPI Study Refit equation without the race variable for the IDMS-Traceable creatinine methods.  https://jasn.asnjournals.org/content/early/2021/09/22/ASN.4296003380    Calcium 10/25/2023 10.0  8.6 - 10.6 mg/dL Final    Albumin 10/25/2023 4.4  3.4 - 5.0 g/dL Final    Alkaline Phosphatase 10/25/2023 72  33 - 136 U/L Final    Total Protein 10/25/2023 7.4  6.4 - 8.2 g/dL Final    AST 10/25/2023 17  9 - 39 U/L Final    Bilirubin, Total 10/25/2023 0.8  0.0 - 1.2 mg/dL Final    ALT 10/25/2023 14  7 - 45 U/L Final    Patients treated with Sulfasalazine may generate falsely decreased results for ALT.    Thyroid Stimulating Hormone 10/25/2023 4.33 (H)  0.44 - 3.98 mIU/L Final    Vitamin D, 25-Hydroxy, Total  10/25/2023 35  30 - 100 ng/mL Final    Thyroxine, Free 10/25/2023 1.04  0.78 - 1.48 ng/dL Final     No images are attached to the encounter.        Assessment and Plan  Assessment/Plan   Problem List Items Addressed This Visit    None  Visit Diagnoses         Codes    Upper respiratory tract infection, unspecified type    -  Primary J06.9    -Continue Robutussin DM   -start flonase   -If symptoms progress to a productive cough with yellow sputum, or you have fever you can start Zpack  (Travelling to [place] and anxious that it symptoms will progress therefore atb script given for patient to have available if she needs it)      Relevant Medications    fluticasone (Flonase) 50 mcg/actuation nasal spray    azithromycin (Zithromax Z-Edin) 250 mg tablet    Other Relevant Orders    Sars-CoV-2 PCR, Symptomatic (Completed)

## 2023-12-01 NOTE — PATIENT INSTRUCTIONS
-Continue taking Robutussin DM at least 3 times per day while congested/coughing  -start flonase 1 spray each nostril   -If symptoms progress to a productive cough with yellow sputum, or you have fever you can start Zpack.   -We will call you to inform you of the COVID result.

## 2023-12-08 ENCOUNTER — LAB (OUTPATIENT)
Dept: LAB | Facility: LAB | Age: 68
End: 2023-12-08
Payer: MEDICARE

## 2023-12-08 DIAGNOSIS — E03.9 ACQUIRED HYPOTHYROIDISM: ICD-10-CM

## 2023-12-08 LAB — TSH SERPL-ACNC: 2.21 MIU/L (ref 0.44–3.98)

## 2023-12-08 PROCEDURE — 36415 COLL VENOUS BLD VENIPUNCTURE: CPT

## 2023-12-08 PROCEDURE — 84443 ASSAY THYROID STIM HORMONE: CPT

## 2024-05-15 DIAGNOSIS — E03.9 HYPOTHYROIDISM, UNSPECIFIED TYPE: ICD-10-CM

## 2024-05-15 RX ORDER — LEVOTHYROXINE SODIUM 25 UG/1
25 TABLET ORAL DAILY
Qty: 90 TABLET | Refills: 3 | Status: SHIPPED | OUTPATIENT
Start: 2024-05-15

## 2024-08-18 DIAGNOSIS — E03.9 HYPOTHYROIDISM, UNSPECIFIED TYPE: ICD-10-CM

## 2024-08-18 RX ORDER — LEVOTHYROXINE SODIUM 25 UG/1
25 TABLET ORAL DAILY
Qty: 90 TABLET | Refills: 3 | Status: SHIPPED | OUTPATIENT
Start: 2024-08-18

## 2024-10-25 ENCOUNTER — APPOINTMENT (OUTPATIENT)
Dept: PRIMARY CARE | Facility: CLINIC | Age: 69
End: 2024-10-25
Payer: MEDICARE

## 2024-11-18 ENCOUNTER — OFFICE VISIT (OUTPATIENT)
Dept: PRIMARY CARE | Facility: CLINIC | Age: 69
End: 2024-11-18
Payer: MEDICARE

## 2024-11-18 VITALS
RESPIRATION RATE: 18 BRPM | HEIGHT: 63 IN | HEART RATE: 78 BPM | WEIGHT: 175 LBS | DIASTOLIC BLOOD PRESSURE: 86 MMHG | TEMPERATURE: 98.2 F | SYSTOLIC BLOOD PRESSURE: 128 MMHG | OXYGEN SATURATION: 94 % | BODY MASS INDEX: 31.01 KG/M2

## 2024-11-18 DIAGNOSIS — E03.9 ACQUIRED HYPOTHYROIDISM: ICD-10-CM

## 2024-11-18 DIAGNOSIS — Z13.1 SCREENING FOR DIABETES MELLITUS: ICD-10-CM

## 2024-11-18 DIAGNOSIS — R73.09 ELEVATED GLUCOSE: ICD-10-CM

## 2024-11-18 DIAGNOSIS — Z12.31 SCREENING MAMMOGRAM FOR BREAST CANCER: ICD-10-CM

## 2024-11-18 DIAGNOSIS — I25.10 ARTERIOSCLEROSIS OF CORONARY ARTERY: ICD-10-CM

## 2024-11-18 DIAGNOSIS — Z76.89 ENCOUNTER TO ESTABLISH CARE: Primary | ICD-10-CM

## 2024-11-18 DIAGNOSIS — R71.8 DECREASED MEAN CORPUSCULAR VOLUME: ICD-10-CM

## 2024-11-18 DIAGNOSIS — Z13.0 SCREENING FOR DEFICIENCY ANEMIA: ICD-10-CM

## 2024-11-18 DIAGNOSIS — Z23 IMMUNIZATION DUE: ICD-10-CM

## 2024-11-18 DIAGNOSIS — I10 PRIMARY HYPERTENSION: ICD-10-CM

## 2024-11-18 DIAGNOSIS — E78.2 MIXED HYPERLIPIDEMIA: ICD-10-CM

## 2024-11-18 PROBLEM — I24.9 ACS (ACUTE CORONARY SYNDROME) (MULTI): Status: ACTIVE | Noted: 2017-02-07

## 2024-11-18 PROBLEM — E55.9 VITAMIN D INSUFFICIENCY: Status: RESOLVED | Noted: 2023-10-25 | Resolved: 2024-11-18

## 2024-11-18 PROBLEM — I21.4 NON-ST ELEVATION (NSTEMI) MYOCARDIAL INFARCTION (MULTI): Status: ACTIVE | Noted: 2017-02-07

## 2024-11-18 PROCEDURE — 1159F MED LIST DOCD IN RCRD: CPT | Performed by: NURSE PRACTITIONER

## 2024-11-18 PROCEDURE — 90662 IIV NO PRSV INCREASED AG IM: CPT | Performed by: NURSE PRACTITIONER

## 2024-11-18 PROCEDURE — 3008F BODY MASS INDEX DOCD: CPT | Performed by: NURSE PRACTITIONER

## 2024-11-18 PROCEDURE — 99203 OFFICE O/P NEW LOW 30 MIN: CPT | Performed by: NURSE PRACTITIONER

## 2024-11-18 PROCEDURE — 1126F AMNT PAIN NOTED NONE PRSNT: CPT | Performed by: NURSE PRACTITIONER

## 2024-11-18 PROCEDURE — 99213 OFFICE O/P EST LOW 20 MIN: CPT | Performed by: NURSE PRACTITIONER

## 2024-11-18 PROCEDURE — 1160F RVW MEDS BY RX/DR IN RCRD: CPT | Performed by: NURSE PRACTITIONER

## 2024-11-18 PROCEDURE — 3079F DIAST BP 80-89 MM HG: CPT | Performed by: NURSE PRACTITIONER

## 2024-11-18 PROCEDURE — 3074F SYST BP LT 130 MM HG: CPT | Performed by: NURSE PRACTITIONER

## 2024-11-18 RX ORDER — METOPROLOL SUCCINATE 25 MG/1
1 TABLET, EXTENDED RELEASE ORAL
COMMUNITY
Start: 2024-08-26

## 2024-11-18 RX ORDER — SACUBITRIL AND VALSARTAN 49; 51 MG/1; MG/1
TABLET, FILM COATED ORAL
COMMUNITY
Start: 2024-07-27

## 2024-11-18 RX ORDER — SACUBITRIL AND VALSARTAN 24; 26 MG/1; MG/1
1 TABLET, FILM COATED ORAL
COMMUNITY
Start: 2024-07-26 | End: 2024-11-18 | Stop reason: ALTCHOICE

## 2024-11-18 RX ORDER — OMEPRAZOLE 20 MG/1
CAPSULE, DELAYED RELEASE ORAL
COMMUNITY
Start: 2024-09-17

## 2024-11-18 SDOH — ECONOMIC STABILITY: FOOD INSECURITY: WITHIN THE PAST 12 MONTHS, YOU WORRIED THAT YOUR FOOD WOULD RUN OUT BEFORE YOU GOT MONEY TO BUY MORE.: NEVER TRUE

## 2024-11-18 SDOH — ECONOMIC STABILITY: FOOD INSECURITY: WITHIN THE PAST 12 MONTHS, THE FOOD YOU BOUGHT JUST DIDN'T LAST AND YOU DIDN'T HAVE MONEY TO GET MORE.: NEVER TRUE

## 2024-11-18 ASSESSMENT — ENCOUNTER SYMPTOMS
LOSS OF SENSATION IN FEET: 0
DEPRESSION: 0
OCCASIONAL FEELINGS OF UNSTEADINESS: 0

## 2024-11-18 ASSESSMENT — PAIN SCALES - GENERAL: PAINLEVEL_OUTOF10: 0-NO PAIN

## 2024-11-18 ASSESSMENT — LIFESTYLE VARIABLES
HOW OFTEN DO YOU HAVE A DRINK CONTAINING ALCOHOL: MONTHLY OR LESS
SKIP TO QUESTIONS 9-10: 0
HOW MANY STANDARD DRINKS CONTAINING ALCOHOL DO YOU HAVE ON A TYPICAL DAY: 1 OR 2
HOW OFTEN DO YOU HAVE SIX OR MORE DRINKS ON ONE OCCASION: LESS THAN MONTHLY
AUDIT-C TOTAL SCORE: 2

## 2024-11-18 ASSESSMENT — PATIENT HEALTH QUESTIONNAIRE - PHQ9
SUM OF ALL RESPONSES TO PHQ9 QUESTIONS 1 AND 2: 0
2. FEELING DOWN, DEPRESSED OR HOPELESS: NOT AT ALL
1. LITTLE INTEREST OR PLEASURE IN DOING THINGS: NOT AT ALL

## 2024-11-18 ASSESSMENT — COLUMBIA-SUICIDE SEVERITY RATING SCALE - C-SSRS
2. HAVE YOU ACTUALLY HAD ANY THOUGHTS OF KILLING YOURSELF?: NO
6. HAVE YOU EVER DONE ANYTHING, STARTED TO DO ANYTHING, OR PREPARED TO DO ANYTHING TO END YOUR LIFE?: NO
1. IN THE PAST MONTH, HAVE YOU WISHED YOU WERE DEAD OR WISHED YOU COULD GO TO SLEEP AND NOT WAKE UP?: NO

## 2024-11-18 NOTE — PROGRESS NOTES
"Subjective   Patient ID: Dayan Lopez is a 69 y.o. female who presents for new pt. visit (Pt. Here to establish care./Pt. Asking for referral  mammogram.).  HPI69 y.o. female with past medical history of hyperlipidemia, CAD, Hypertension, ACS, NSTEMI MI, macular degeneration, hypothyroidism, GERD, anxiety presents today to establish care and mammogram requisition.      Cardiologist - Dr. Brett Olmstead - 1 stent 2007 -MI On Entresto EF @35%  Gastro - Dr. Walker - hiatal hernia  Eye doctor - Dr Duncan - macular degeneration  ENT - [facility] - hearing.     Medication reconciliation performed.     Review of Systems  Review of systems: Present-feeling well. Not present-chills, fatigue and fever.  Skin: Not present-new lesions and rash.  HEENT: Seasonal allergies.  Not present-headache, ear pain, nasal congestion, and sore throat.  Neck: Not present-neck pain, neck stiffness and swollen glands.  Respiratory: Not present-difficulty breathing, cough, bloody sputum.  Cardiovascular: Not present-chest pain, edema, palpitations, dyspnea on exertion.  Gastrointestinal: GERD on omeprazole.  Not present-abdominal pain, bloody or very black stools, jaundice, nausea and vomiting.  Genitourinary: Not present-change in bladder habits, hematuria, flank pain and dysuria.  Musculoskeletal: Not present- joint pain, joint swelling, joint redness.  Neurological: Not present-dizziness, headache.  Psychiatric: Anxious depression controlled on current medical regime.  Not present- suicidal ideation, suicidal planning, thoughts of hurting others and thoughts of self-harm.  Endocrine: On Levothyroxine.  Hematology:  Not present-anemia, excessive bleeding, bruising and epistaxis.    Objective   /86 (BP Location: Left arm, Patient Position: Sitting, BP Cuff Size: Adult)   Pulse 78   Temp 36.8 °C (98.2 °F) (Temporal)   Resp 18   Ht 1.6 m (5' 3\")   Wt 79.4 kg (175 lb)   SpO2 94%   BMI 31.00 kg/m²      Physical Exam  Gen.: Mental " status-alert. Gen. appearance-cooperative, well groomed and consistent with stated age. Not in acute distress or sickly. Orientation-oriented to time, place, purpose and person. Build and nutrition-well-nourished and well-developed. Hydration-well-hydrated.    Integumentary: Color-normal coloration skin. Skin moisture-normal skin moisture.    Head and neck: Head-normocephalic, atraumatic with no lesions or palpable masses. Face-atraumatic. Neck-full range of motion and subtle. No lymphadenopathy and no nuchal rigidity.    Chest and lung exam: Auscultation-normal breath sounds, no adventitious lung sounds and normal vocal resonance. Chest wall is normal in shape and non-tender.    Cardiovascular: Auscultation: Regular rate and rhythm. Heart sounds-normal heart sounds, S1-S2. No murmurs or gallops appreciated. Carotid arteries normal and without bruit.    Abdomen: Non-tender, no rigidity.  Auscultation-auscultation of the abdomen reveals normal bowel sounds throughout.     Peripheral vascular: Normal temperature and no edema bilaterally.    Neurologic: Mental kdxvxw-irjpbf-rhojydouhty. Cranial nerves: Cranial nerves II through XII grossly intact. Normal gait.    Musculoskeletal: Examination of the spine with no step-offs or point tenderness.  Full range of motion of the spine without pain or difficulty. Right lower extremity-normal strength and tone, normal range of motion without pain. Left lower extremity-normal strength and tone, normal range of motion without pain.  Assessment/Plan   Diagnoses and all orders for this visit:  Encounter to establish care  Screening mammogram for breast cancer  -     BI mammo bilateral screening tomosynthesis; Future  Primary hypertension  -     Comprehensive Metabolic Panel; Future  Mixed hyperlipidemia  -     Lipid Panel; Future  Acquired hypothyroidism  -     TSH with reflex to Free T4 if abnormal; Future  Screening for diabetes mellitus  -     Comprehensive Metabolic Panel;  Future  -     Hemoglobin A1C; Future  Screening for deficiency anemia  -     CBC; Future  Elevated glucose  -     Comprehensive Metabolic Panel; Future  -     Hemoglobin A1C; Future  Arteriosclerosis of coronary artery  -     Lipid Panel; Future  Decreased mean corpuscular volume  -     CBC; Future  Immunization due  -     Flu vaccine, trivalent, preservative free, HIGH-DOSE, age 65y+ (Fluzone)

## 2024-11-22 ENCOUNTER — LAB (OUTPATIENT)
Dept: LAB | Facility: LAB | Age: 69
End: 2024-11-22
Payer: MEDICARE

## 2024-11-22 DIAGNOSIS — R71.8 DECREASED MEAN CORPUSCULAR VOLUME: ICD-10-CM

## 2024-11-22 DIAGNOSIS — I25.10 ARTERIOSCLEROSIS OF CORONARY ARTERY: ICD-10-CM

## 2024-11-22 DIAGNOSIS — Z13.0 SCREENING FOR DEFICIENCY ANEMIA: ICD-10-CM

## 2024-11-22 DIAGNOSIS — E03.9 ACQUIRED HYPOTHYROIDISM: ICD-10-CM

## 2024-11-22 DIAGNOSIS — E78.2 MIXED HYPERLIPIDEMIA: ICD-10-CM

## 2024-11-22 DIAGNOSIS — I10 PRIMARY HYPERTENSION: ICD-10-CM

## 2024-11-22 DIAGNOSIS — Z13.1 SCREENING FOR DIABETES MELLITUS: ICD-10-CM

## 2024-11-22 DIAGNOSIS — R73.09 ELEVATED GLUCOSE: ICD-10-CM

## 2024-11-22 LAB
ALBUMIN SERPL BCP-MCNC: 4.3 G/DL (ref 3.4–5)
ALP SERPL-CCNC: 67 U/L (ref 33–136)
ALT SERPL W P-5'-P-CCNC: 11 U/L (ref 7–45)
ANION GAP SERPL CALC-SCNC: 12 MMOL/L (ref 10–20)
AST SERPL W P-5'-P-CCNC: 15 U/L (ref 9–39)
BILIRUB SERPL-MCNC: 0.7 MG/DL (ref 0–1.2)
BUN SERPL-MCNC: 11 MG/DL (ref 6–23)
CALCIUM SERPL-MCNC: 9.3 MG/DL (ref 8.6–10.3)
CHLORIDE SERPL-SCNC: 101 MMOL/L (ref 98–107)
CHOLEST SERPL-MCNC: 170 MG/DL (ref 0–199)
CHOLESTEROL/HDL RATIO: 2.5
CO2 SERPL-SCNC: 32 MMOL/L (ref 21–32)
CREAT SERPL-MCNC: 0.86 MG/DL (ref 0.5–1.05)
EGFRCR SERPLBLD CKD-EPI 2021: 74 ML/MIN/1.73M*2
ERYTHROCYTE [DISTWIDTH] IN BLOOD BY AUTOMATED COUNT: 14.9 % (ref 11.5–14.5)
GLUCOSE SERPL-MCNC: 119 MG/DL (ref 74–99)
HCT VFR BLD AUTO: 43.9 % (ref 36–46)
HDLC SERPL-MCNC: 67.5 MG/DL
HGB BLD-MCNC: 13.5 G/DL (ref 12–16)
LDLC SERPL CALC-MCNC: 79 MG/DL
MCH RBC QN AUTO: 25.4 PG (ref 26–34)
MCHC RBC AUTO-ENTMCNC: 30.8 G/DL (ref 32–36)
MCV RBC AUTO: 83 FL (ref 80–100)
NON HDL CHOLESTEROL: 103 MG/DL (ref 0–149)
NRBC BLD-RTO: 0 /100 WBCS (ref 0–0)
PLATELET # BLD AUTO: 390 X10*3/UL (ref 150–450)
POTASSIUM SERPL-SCNC: 4.2 MMOL/L (ref 3.5–5.3)
PROT SERPL-MCNC: 7 G/DL (ref 6.4–8.2)
RBC # BLD AUTO: 5.31 X10*6/UL (ref 4–5.2)
SODIUM SERPL-SCNC: 141 MMOL/L (ref 136–145)
TRIGL SERPL-MCNC: 116 MG/DL (ref 0–149)
TSH SERPL-ACNC: 2.36 MIU/L (ref 0.44–3.98)
VLDL: 23 MG/DL (ref 0–40)
WBC # BLD AUTO: 8.5 X10*3/UL (ref 4.4–11.3)

## 2024-11-22 PROCEDURE — 83036 HEMOGLOBIN GLYCOSYLATED A1C: CPT

## 2024-11-22 PROCEDURE — 36415 COLL VENOUS BLD VENIPUNCTURE: CPT

## 2024-11-22 PROCEDURE — 80061 LIPID PANEL: CPT

## 2024-11-22 PROCEDURE — 80053 COMPREHEN METABOLIC PANEL: CPT

## 2024-11-22 PROCEDURE — 85027 COMPLETE CBC AUTOMATED: CPT

## 2024-11-22 PROCEDURE — 84443 ASSAY THYROID STIM HORMONE: CPT

## 2024-11-23 NOTE — PATIENT INSTRUCTIONS
Encounter to establish   Hypertension controlled at this time on current medical regime.   Continue to monitor bp >160/90 or < 90/60.    Follows with Cardiology.     Hyperlipidemia, elevated glucose and hypothyroidism - Fasting labs to be obtained.    Mammogram requisition given.   She will be notified of results as they become available.     Flu vaccine given without reaction.     Follow up for Medicare Wellness Exam.

## 2024-11-24 LAB
EST. AVERAGE GLUCOSE BLD GHB EST-MCNC: 131 MG/DL
HBA1C MFR BLD: 6.2 %

## 2024-11-26 ENCOUNTER — APPOINTMENT (OUTPATIENT)
Dept: AUDIOLOGY | Facility: CLINIC | Age: 69
End: 2024-11-26
Payer: MEDICARE

## 2024-11-27 ENCOUNTER — CLINICAL SUPPORT (OUTPATIENT)
Dept: AUDIOLOGY | Facility: CLINIC | Age: 69
End: 2024-11-27
Payer: MEDICARE

## 2024-11-27 DIAGNOSIS — R42 DIZZINESS AND GIDDINESS: Primary | ICD-10-CM

## 2024-11-27 PROCEDURE — 92557 COMPREHENSIVE HEARING TEST: CPT | Performed by: AUDIOLOGIST

## 2024-11-27 PROCEDURE — 92550 TYMPANOMETRY & REFLEX THRESH: CPT | Performed by: AUDIOLOGIST

## 2024-11-27 NOTE — PROGRESS NOTES
"AUDIOLOGY ADULT AUDIOMETRIC EVALUATION      Name:  Dayan Lopez  :  1955  Age:  69 y.o.  Date of Evaluation:   2024    HISTORY  Reason for visit:  dizziness  Ms. Lopez is seen 2024 at the request of Liv Lugo CNP  for an evaluation of hearing.  (Seeing Liv Lugo CNP 2024.)    Chief complaint:    Episodes of vertigo, most recently about 2-3 weeks ago; imbalance, feels like she is going to fall; happens first thing in the morning/getting in and out of bed     Hearing loss:   right hearing loss   Tinnitus:   denies   Otitis Media: denies  Otologic surgical history:  denies  Dizziness/imbalance:  yes  Otalgia:  denies  Ear pressure/fullness:  denies  History of excessive noise exposure:  denies  Other: none         EVALUATION  Please find audiogram in \"Media\" tab (Document Type:  Audiology Report) or included at the bottom of this note.    RESULTS   Otoscopic Evaluation: clear canals bilaterally      Immittance Measures (226 Hz probe tone):   Tympanometry is consistent with normal middle ear pressure and normal tympanic membrane mobility bilaterally.       Ipsilateral acoustic reflexes (500-4000 Hz) are present for the right ear for 500-2000 Hz (absent 4000 Hz) and present for the left ear for 500-4000 Hz.       Test technique:  standard behavioral technique via TDH earphones (checked with insert earphones).  Reliability is good.    Pure Tone Audiometry:  Hearing sensitivity is in the normal hearing to moderate hearing loss range bilaterally.     Speech Audiometry:        Right Ear:  Speech Reception Threshold (SRT) was obtained at 15 dBHL                 Speech discrimination score was 100% in quiet when words were presented at 65 dBHL (10 item NU-6 ordered-by-difficulty list).        Left Ear:  Speech Reception Threshold (SRT) was obtained at 15 dBHL                 Speech discrimination score was 100% in quiet when words were presented at 65 dBHL (10 item NU-6 " ordered-by-difficulty list).      IMPRESSIONS:  Patient is expected to have communication difficulty in adverse listening environments.    Patient is expected to benefit from effective communication strategies and may benefit from devices that provide amplification and/or improve the desired sound signal over that of background noise.       RECOMMENDATIONS  Continue with medical follow-up with Liv Lugo CNP.  Reassess hearing in 1 year (or sooner if medically indicated or if there is a concern for a change in hearing).    Continue with medical follow-up as indicated.       PATIENT EDUCATION  Discussed results and recommendations with patient.  Questions were addressed and the patient was encouraged to contact our department should concerns arise.       SARAH Barrett, CCC-A  Licensed Audiologist

## 2024-12-01 ENCOUNTER — TELEPHONE (OUTPATIENT)
Dept: PRIMARY CARE | Facility: CLINIC | Age: 69
End: 2024-12-01
Payer: MEDICARE

## 2024-12-01 DIAGNOSIS — R71.8 ABNORMAL RED BLOOD CELLS: Primary | ICD-10-CM

## 2024-12-01 NOTE — TELEPHONE ENCOUNTER
Lvm re: labs.   Hemoglobin A1c 6.2 -- diet, exercise and weight loss encouraged.    Also would like to recheck RBC production.  Order placed.

## 2024-12-02 ENCOUNTER — OFFICE VISIT (OUTPATIENT)
Dept: PRIMARY CARE | Facility: CLINIC | Age: 69
End: 2024-12-02
Payer: MEDICARE

## 2024-12-02 VITALS
WEIGHT: 177 LBS | BODY MASS INDEX: 30.22 KG/M2 | TEMPERATURE: 98.2 F | HEART RATE: 90 BPM | HEIGHT: 64 IN | SYSTOLIC BLOOD PRESSURE: 128 MMHG | OXYGEN SATURATION: 100 % | DIASTOLIC BLOOD PRESSURE: 82 MMHG | RESPIRATION RATE: 16 BRPM

## 2024-12-02 DIAGNOSIS — Z78.0 POSTMENOPAUSE: ICD-10-CM

## 2024-12-02 DIAGNOSIS — Z23 IMMUNIZATION DUE: ICD-10-CM

## 2024-12-02 DIAGNOSIS — R82.2 BILIRUBINURIA: ICD-10-CM

## 2024-12-02 DIAGNOSIS — Z13.89 SCREENING FOR BLOOD OR PROTEIN IN URINE: ICD-10-CM

## 2024-12-02 DIAGNOSIS — R71.8 ABNORMAL RED BLOOD CELLS: ICD-10-CM

## 2024-12-02 DIAGNOSIS — R80.9 PROTEINURIA, UNSPECIFIED TYPE: ICD-10-CM

## 2024-12-02 DIAGNOSIS — R80.9 PROTEINURIA, UNSPECIFIED TYPE: Primary | ICD-10-CM

## 2024-12-02 DIAGNOSIS — Z00.00 MEDICARE ANNUAL WELLNESS VISIT, SUBSEQUENT: Primary | ICD-10-CM

## 2024-12-02 LAB
CREAT UR-MCNC: 267.7 MG/DL (ref 20–320)
MICROALBUMIN UR-MCNC: 245.5 MG/L
MICROALBUMIN/CREAT UR: 91.7 UG/MG CREAT
POC APPEARANCE, URINE: CLEAR
POC BILIRUBIN, URINE: ABNORMAL
POC BLOOD, URINE: NEGATIVE
POC COLOR, URINE: YELLOW
POC GLUCOSE, URINE: NEGATIVE MG/DL
POC KETONES, URINE: ABNORMAL MG/DL
POC LEUKOCYTES, URINE: NEGATIVE
POC NITRITE,URINE: NEGATIVE
POC PH, URINE: 5.5 PH
POC PROTEIN, URINE: ABNORMAL MG/DL
POC SPECIFIC GRAVITY, URINE: 1.02
POC UROBILINOGEN, URINE: 1 EU/DL

## 2024-12-02 PROCEDURE — 1126F AMNT PAIN NOTED NONE PRSNT: CPT | Performed by: NURSE PRACTITIONER

## 2024-12-02 PROCEDURE — 87086 URINE CULTURE/COLONY COUNT: CPT | Mod: PARLAB | Performed by: NURSE PRACTITIONER

## 2024-12-02 PROCEDURE — 3008F BODY MASS INDEX DOCD: CPT | Performed by: NURSE PRACTITIONER

## 2024-12-02 PROCEDURE — 90677 PCV20 VACCINE IM: CPT | Performed by: NURSE PRACTITIONER

## 2024-12-02 PROCEDURE — 99215 OFFICE O/P EST HI 40 MIN: CPT | Performed by: NURSE PRACTITIONER

## 2024-12-02 PROCEDURE — 3074F SYST BP LT 130 MM HG: CPT | Performed by: NURSE PRACTITIONER

## 2024-12-02 PROCEDURE — 81003 URINALYSIS AUTO W/O SCOPE: CPT | Mod: QW | Performed by: NURSE PRACTITIONER

## 2024-12-02 PROCEDURE — 90662 IIV NO PRSV INCREASED AG IM: CPT | Performed by: NURSE PRACTITIONER

## 2024-12-02 PROCEDURE — 1160F RVW MEDS BY RX/DR IN RCRD: CPT | Performed by: NURSE PRACTITIONER

## 2024-12-02 PROCEDURE — 3079F DIAST BP 80-89 MM HG: CPT | Performed by: NURSE PRACTITIONER

## 2024-12-02 PROCEDURE — G0439 PPPS, SUBSEQ VISIT: HCPCS | Performed by: NURSE PRACTITIONER

## 2024-12-02 PROCEDURE — 82043 UR ALBUMIN QUANTITATIVE: CPT | Performed by: NURSE PRACTITIONER

## 2024-12-02 PROCEDURE — 1159F MED LIST DOCD IN RCRD: CPT | Performed by: NURSE PRACTITIONER

## 2024-12-02 ASSESSMENT — MINI MENTAL STATE EXAM
WHAT STATE, COUNTRY, CITY, HOSPITAL, FLOOR: 5 CORRECT
HAND THE PERSON A PENCIL AND PAPER. SAY:  WRITE ANY COMPLETE SENTENCE ON THAT PIECE OF PAPER. (NOTE: THE SENTENCE MUST MAKE SENSE.  IGNORE SPELLING ERRORS): 1 CORRECT
PLEASE COPY THIS PICTURE (NOTE ALL 10 ANGLES MUST BE PRESENT AND TWO MUST INTERSECT): 1 CORRECT
SHOW: PENCIL [OBJECT] ASK: WHAT IS THIS CALLED?: 2 CORRECT
NAME OR REPEAT 3 OBJECTS - (APPLE, TABLE, PENNY) OR (BALL, TREE, FLAG): 3 CORRECT
SAY: I WOULD LIKE YOU TO REPEAT THIS PHRASE AFTER ME: NO IFS, ANDS, OR BUTS.: 1 CORRECT
SPELL THE WORD WORLD FORWARD AND BACKWARDS OR SERIAL 7S: 5 CORRECT
RECALL THE 3 OBJECTS FROM ABOVE (APPLE, TABLE, PENNY) OR (BALL, TREE, FLAG): 3 CORRECT
SAY:  READ THE WORDS ON THE PAGE AND THEN DO WHAT IT SAYS.  THEN HAND THE PERSON THE SHEET WITH CLOSE YOUR EYES ON IT.  IF THE SUBJECT READS AND DOES NOT CLOSE THEIR EYES, REPEAT UP TO THREE TIMES.  SCORE ONLY IF SUBJECT CLOSES EYES.: 3 CORRECT
WHAT IS THE YEAR, SEASON, DATE, DAY, AND MONTH: 5 CORRECT
PLACE DESIGN, ERASER AND PENCIL IN FRONT OF THE PERSON.  SAY:  COPY THIS DESIGN PLEASE.  SHOW: DESIGN. ALLOW: MULTIPLE TRIES. WAIT UNTIL PERSON IS FINISHED AND HANDS IT BACK. SCORE: ONLY FOR DIAGRAM WITH 4-SIDED FIGURE BETWEEN TWO 5-SIDED FIGURES: 1 CORRECT
SUM ALL MMSE QUESTIONS FOR TOTAL SCORE [OUT OF 30].: 30

## 2024-12-02 ASSESSMENT — PAIN SCALES - GENERAL: PAINLEVEL_OUTOF10: 0-NO PAIN

## 2024-12-02 ASSESSMENT — ENCOUNTER SYMPTOMS
OCCASIONAL FEELINGS OF UNSTEADINESS: 0
LOSS OF SENSATION IN FEET: 1
DEPRESSION: 0

## 2024-12-02 NOTE — PATIENT INSTRUCTIONS
Medicare wellness exam  UA - small bilirubin, 15 ketone, protein 100   Low MCH and MCHC and elevated rdw- No history of thalassemia per patient - asymptomatic.   Bone Density to be obtained.   - encouraged vit d and weight bearing exercises.     Flu vaccine given today without reaction   Prevnar 20 given 11/18/2024 by Soraya Wakefield RN

## 2024-12-02 NOTE — PROGRESS NOTES
Subjective   Patient ID: Dayan Lopez is a 69 y.o. female who presents for medical wellness visit (Pt. Here for medicare wellness visit./Pt. Wants to discuss glucose. ).  HPI69 y.o. female with past medical history of hyperlipidemia, CAD, Hypertension, ACS, NSTEMI MI, macular degeneration, hypothyroidism, GERD, anxiety presents today for Medicare wellness exam.      Cardiologist - Dr. Brett Olmstead - 1 stent 2007 -MI On Entresto EF @35%  Gastro - Dr. Walker - hiatal hernia  Eye doctor - Dr Duncan - macular degeneration  ENT - [facility] - hearing.   MMSE 30/30  Last eye exam - glasses   Last dental exam - October   Scheduled for Mammogram Wednesday   Bone density - requisition given --encouraged vit d and calcium.  Reviewed labs 11/22/2024  Leaving dec 23 -[place] for 3 months.    Review of Systems  Review of systems: Present-feeling well. Not present-chills, fatigue and fever.  Skin: Not present- new lesions and rash.  HEENT: Not present-headache, diplopia, visual loss, ear pain, tinnitus, vertigo, seasonal allergies, nasal congestion, and sore throat.  Neck: Not present-neck pain, neck stiffness and swollen glands.  Respiratory: Not present-difficulty breathing, cough, bloody sputum.  Cardiovascular: Not present-abnormal blood pressure, chest pain, edema, fainting, leg pain, leg swelling, palpitations, dyspnea on exertion, shortness of breath and slow heart rate.  Gastrointestinal: Not present-abdominal pain, bloody or very black stools, changes in bowel habits, heartburn, incontinence of stool, jaundice, nausea and vomiting.  Genitourinary: Not present-change in bladder habits, hematuria, flank pain, frequency, urinary incontinence, urgency and dysuria.  Musculoskeletal: Not present-back pain, claudication, joint pain, joint swelling, joint redness, and muscular weakness.  Neurological: Not present-dizziness, headache, trouble walking, unsteadiness, vertigo, weakness, numbness or tingling.  Psychiatric: Not  "present-anxiety, impaired cognitive functioning, insomnia, depression, trouble falling asleep, panic attacks, suicidal ideation, suicidal planning, thoughts of hurting others and thoughts of self-harm.  Endocrine: Not present-appetite changes, polydipsia, polyuria, and thyroid problems.  Hematology:  Not present-anemia, excessive bleeding, bruising and epistaxis.    Objective   /82 (BP Location: Right arm, Patient Position: Sitting, BP Cuff Size: Adult) Comment: manual  Pulse 90   Temp 36.8 °C (98.2 °F) (Temporal)   Resp 16   Ht 1.613 m (5' 3.5\")   Wt 80.3 kg (177 lb)   SpO2 100%   BMI 30.86 kg/m²      Physical Exam  Gen.: Mental status-alert. Gen. appearance-cooperative, well groomed and consistent with stated age. Not in acute distress or sickly. Orientation-oriented to time, place,  purpose and person. Build and nutrition-well-nourished and well-developed. Hydration-well-hydrated.    Integumentary: Color-normal coloration skin. Skin moisture-normal skin moisture.    Head and neck: Head-normocephalic, atraumatic with no lesions or palpable masses. Face-atraumatic. Neck-full range of motion and subtle. No lymphadenopathy and no nuchal rigidity. Thyroid-normal size and consistency with no palpable lumps.    ENMT: Ears-no tenderness noted to the external auditory canal-bilaterally. Otoscopic exam: Tympanic membranes-left-tympanic membrane is gray in appearance. Right-tympanic membrane is gray in appearance. Nose -frontal sinuses-non-tender and no purulent drainage. Maxillary sinuses-non-tender and no purulent drainage. Mouth: Oral mucosa-pink and moist. Oropharynx: No airway distress, bulging of the pharyngeal wall, edema of the uvula, and no pharyngeal erythema.    Chest and lung exam: Auscultation-normal breath sounds, no adventitious lung sounds and normal vocal resonance. Chest wall is normal in shape and non-tender.    Cardiovascular: Auscultation: Regular rate and rhythm. Heart sounds-normal heart " sounds, S1-S2. No murmurs or gallops appreciated. Carotid arteries without bruits.    Abdomen: Non-tender, no rigidity. Auscultation-auscultation of the abdomen reveals normal bowel sounds throughout.     Peripheral vascular: Normal temperature and no edema bilaterally.    Neurologic: Mental moxgrp-yvonmh-esijhloastv. Cranial nerves: Cranial nerves II through XII grossly intact. Normal gait.    Musculoskeletal: Examination of the spine with no step-offs or point tenderness.  Full range of motion of the spine without pain or difficulty. Right lower extremity-normal strength and tone, normal range of motion without pain. Left lower extremity-normal strength and tone, normal range of motion without pain.  Assessment/Plan   Diagnoses and all orders for this visit:  Medicare annual wellness visit, subsequent  -     CBC; Future  -     Vitamin B12; Future  -     Ferritin; Future  -     Iron and TIBC; Future  -     XR DEXA bone density; Future  -     POCT UA Automated manually resulted  Immunization due  -     Flu vaccine, trivalent, preservative free, HIGH-DOSE, age 65y+ (Fluzone)  -     Pneumococcal conjugate vaccine, 20-valent (PREVNAR 20)  Abnormal red blood cells  -     CBC; Future  -     Vitamin B12; Future  -     Ferritin; Future  -     Iron and TIBC; Future  Postmenopause  -     XR DEXA bone density; Future  Screening for blood or protein in urine  -     POCT UA Automated manually resulted  Proteinuria, unspecified type  -     Urine Culture  -     Albumin-Creatinine Ratio, Urine Random

## 2024-12-03 LAB — BACTERIA UR CULT: NO GROWTH

## 2024-12-04 ENCOUNTER — HOSPITAL ENCOUNTER (OUTPATIENT)
Dept: RADIOLOGY | Facility: CLINIC | Age: 69
Discharge: HOME | End: 2024-12-04
Payer: MEDICARE

## 2024-12-04 VITALS — WEIGHT: 187 LBS | HEIGHT: 63 IN | BODY MASS INDEX: 33.13 KG/M2

## 2024-12-04 DIAGNOSIS — Z12.31 SCREENING MAMMOGRAM FOR BREAST CANCER: ICD-10-CM

## 2024-12-04 PROCEDURE — 77067 SCR MAMMO BI INCL CAD: CPT

## 2024-12-06 ENCOUNTER — HOSPITAL ENCOUNTER (OUTPATIENT)
Dept: RADIOLOGY | Facility: CLINIC | Age: 69
Discharge: HOME | End: 2024-12-06
Payer: MEDICARE

## 2024-12-06 DIAGNOSIS — Z00.00 MEDICARE ANNUAL WELLNESS VISIT, SUBSEQUENT: ICD-10-CM

## 2024-12-06 DIAGNOSIS — Z78.0 POSTMENOPAUSE: ICD-10-CM

## 2024-12-06 PROCEDURE — 77080 DXA BONE DENSITY AXIAL: CPT

## 2024-12-09 ENCOUNTER — OFFICE VISIT (OUTPATIENT)
Dept: OTOLARYNGOLOGY | Facility: CLINIC | Age: 69
End: 2024-12-09
Payer: MEDICARE

## 2024-12-09 VITALS — WEIGHT: 187 LBS | HEIGHT: 64 IN | BODY MASS INDEX: 31.92 KG/M2 | TEMPERATURE: 97.2 F

## 2024-12-09 DIAGNOSIS — H81.10 BENIGN PAROXYSMAL POSITIONAL VERTIGO, UNSPECIFIED LATERALITY: ICD-10-CM

## 2024-12-09 DIAGNOSIS — R42 VERTIGO: Primary | ICD-10-CM

## 2024-12-09 DIAGNOSIS — H90.3 SENSORINEURAL HEARING LOSS (SNHL) OF BOTH EARS: ICD-10-CM

## 2024-12-09 PROCEDURE — 1126F AMNT PAIN NOTED NONE PRSNT: CPT | Performed by: NURSE PRACTITIONER

## 2024-12-09 PROCEDURE — 1159F MED LIST DOCD IN RCRD: CPT | Performed by: NURSE PRACTITIONER

## 2024-12-09 PROCEDURE — 99214 OFFICE O/P EST MOD 30 MIN: CPT | Performed by: NURSE PRACTITIONER

## 2024-12-09 PROCEDURE — 99204 OFFICE O/P NEW MOD 45 MIN: CPT | Performed by: NURSE PRACTITIONER

## 2024-12-09 PROCEDURE — 3008F BODY MASS INDEX DOCD: CPT | Performed by: NURSE PRACTITIONER

## 2024-12-09 PROCEDURE — 1036F TOBACCO NON-USER: CPT | Performed by: NURSE PRACTITIONER

## 2024-12-09 ASSESSMENT — ENCOUNTER SYMPTOMS
OCCASIONAL FEELINGS OF UNSTEADINESS: 0
LOSS OF SENSATION IN FEET: 0
DEPRESSION: 0

## 2024-12-09 ASSESSMENT — COLUMBIA-SUICIDE SEVERITY RATING SCALE - C-SSRS
2. HAVE YOU ACTUALLY HAD ANY THOUGHTS OF KILLING YOURSELF?: NO
1. IN THE PAST MONTH, HAVE YOU WISHED YOU WERE DEAD OR WISHED YOU COULD GO TO SLEEP AND NOT WAKE UP?: NO
6. HAVE YOU EVER DONE ANYTHING, STARTED TO DO ANYTHING, OR PREPARED TO DO ANYTHING TO END YOUR LIFE?: NO

## 2024-12-09 ASSESSMENT — PAIN SCALES - GENERAL: PAINLEVEL_OUTOF10: 0-NO PAIN

## 2024-12-09 NOTE — PROGRESS NOTES
Subjective   Patient ID: Dayan Lopez is a 69 y.o. female who presents for Vertigo.    HPI  Patient here for vertigo.  She has had this for years. It's worse around the fall/winter.   She describes it as a spinning sensation. If she gets up too fast she feels like she is going to fall. It's quick, once she sits down it's okay. Sometimes she gets nauseous as well.   It's not as bad right now.   Occasional headaches. Denies blurry vision, and double vision. She gets some neck pain.   Bright lights and loud sounds do not make the patient dizzy. Denies pressure induced dizziness. Denies autophony. Denies positional vertigo.   She did go to  through [facility], and it did help her symptoms.  She has some hearing loss. Denies ear pain, drainage and tinnitus. Denies previous ear surgery.     Patient Active Problem List   Diagnosis    Hyperlipidemia    Acid reflux    Epistaxis, recurrent    Anxiety    Arteriosclerosis of coronary artery    HTN (hypertension)    Drusen (degenerative) of macula, bilateral    Acquired hypothyroidism    ACS (acute coronary syndrome) (Multi)    Non-ST elevation (NSTEMI) myocardial infarction (Multi)     Past Surgical History:   Procedure Laterality Date    COLONOSCOPY  10/11/2019    diverticulosis, otherwise normal, due in 2029    CORONARY ANGIOPLASTY WITH STENT PLACEMENT  2007    HYSTERECTOMY  12/05/2014    Hysterectomy    OTHER SURGICAL HISTORY  12/05/2014    Transcath Placement Of Intrathoracic Carotid Artery Stent    OTHER SURGICAL HISTORY  12/05/2014    Treatment Of The Right Leg     Review of Systems    Objective   Physical Exam    Constitutional: No fever, chills, weight loss or weight gain  General appearance: Appears well, well-nourished, well groomed. No acute distress.    Communication: Normal communication    Psychiatric: Oriented to person, place and time. Normal mood and affect.    Neurologic: Cranial nerves II-XII grossly intact and symmetric bilaterally.    Head and Face:  Head:  Atraumatic with no masses, lesions or scarring.  Face: Normal symmetry. No scars or deformities.  TMJ: Normal, no trismus.    Eyes: Conjunctiva not edematous or erythematous. PERRLA    Right Ear: External inspection of ear with no deformity, scars, or masses. EAC is clear.  TM is intact with no sign of infection, effusion, or retraction.  No perforation seen.     Left Ear: External inspection of ear with no deformity, scars, or masses. EAC is clear.  TM is intact with no sign of infection, effusion, or retraction.  No perforation seen.     Nose: External inspection of nose: No nasal lesions, lacerations or scars. Anterior rhinoscopy with limited visualization past the inferior turbinates. No tenderness on frontal or maxillary sinus palpation.    Oral Cavity/Mouth: Oral cavity and oropharynx mucosa moist and pink. No lesions or masses. Tonsils appear normal. Uvula is midline. Tongue with no masses or lesions. Tongue with good mobility. The oropharynx is clear.    Neck: Normal appearing, symmetric, trachea midline.     Cardiovascular: Examination of peripheral vascular system shows no clubbing or cyanosis.    Respiratory: No respiratory distress increased work of breathing. Inspection of the chest with symmetric chest expansion and normal respiratory effort.    Skin: No head and neck rashes.    Lymph nodes: No adenopathy.    On vestibular exam, oculomotor function assessment shows normal ocular pursuits and saccades. There is no spontaneous nystagmus. Head Thrust test is negative bilaterally.  Postural testing performed. Romberg is negative for any obvious weakness/sway. Tandem Gait is unsteady.    Diagnostic Results       Assessment/Plan   Diagnoses and all orders for this visit:  Vertigo  Benign paroxysmal positional vertigo, unspecified laterality  -     Referral to Physical Therapy; Future  Sensorineural hearing loss (SNHL) of both ears    The physiology of balance control was explained. The likely possible  etiologies were reviewed and the patient was thoroughly evaluated for BPPV, vestibular neuritis/labyrinthitis, vestibular hypofunction, Menieres Disease, and SCCD. I believe the patient does have a peripheral vestibular disorder.  Her symptoms are consistent with BPPV.  However patient has not had an episode in a few weeks.  We discussed repositioning in clinic today but since her symptoms have not been recent we decided to monitor at this time.  Her and her  leave for [place] in a couple of weeks for 3 months.  I placed a physical therapy referral so that if this happens down in [place] she can go to physical therapist down there.  I also printed her information on BPPV and exercises to do at home.    Use Afrin nasal spray during your flight to help with ear blockage. Administer two sprays in each nostril prior to taking off, and two sprays in each nostril prior to landing. This helps open the eustachian tubes to equalize the pressure behind the ear drum during these abnormal pressure changes.  Do not use Afrin nasal spray everyday, as rebound nasal congestion can occur.    We also reviewed her audiogram in detail we will repeat audiograms annually to monitor hearing.    All questions answered to patient's satisfaction.    This note was created using speech recognition transcription software. Despite proofreading, several typographical errors might be present that might affect the meaning of the content. Please call with any questions.         MEMO Rubio 12/09/24 7:55 AM

## 2024-12-09 NOTE — PATIENT INSTRUCTIONS
Use Afrin nasal spray during your flight to help with ear blockage. Administer two sprays in each nostril prior to taking off, and two sprays in each nostril prior to landing. This helps open the eustachian tubes to equalize the pressure behind the ear drum during these abnormal pressure changes.  Do not use Afrin nasal spray everyday, as rebound nasal congestion can occur.     Welcome to Liv Lugo's clinic. We are here to assist you through your ENT care at [facility].  Liv is a Nurse Practitioner who specializes in General ENT. This means that she specializes in taking care of patients with usual ENT issues such as nasal congestion, allergy symptoms, sinusitis, hearing loss, ear infections, ear wax removal, hoarseness, sore throat, throat infections, reflux and some swallowing issues. She also sees patients regarding dizziness and vertigo.   Nery is Liv's  and she answers the office phone from 7:30am-4pm Mon-Fri. Call [phone]. She can help you with scheduling of appointments, and general questions and information. You may need to leave a message if she is helping another patient. In this case, someone from the team will call you back the same day if you leave your message before 3pm, or the next business morning.  Liv currently sees patients at [facility] on Mondays, Wednesdays and Thursdays.  She works closely with audiologists to solve issues with hearing. She is also in very close contact with her collaborative physicians. Dr. Kaplan, a surgeon who specializes in general ENT and rhinology. She also works closely with otologist (ear surgeon) Dr. Quinn and head and neck surgery Dr. Pringle.   Others who may be included in your care are dieticians, social workers, allergists, gastroenterologists, neurologists, and physical therapists. Liv will provide these referrals as needed. Please let her know if you would like to request a specific  referral.  Liv makes every effort to run on time for your appointments. Therefore, if you are more than 15 minutes late unrelated to a scan or another appointment such as therapy or audiology, your appointment will need to be rescheduled for another day. We appreciate your understanding.   We look forward to working with you to meet your healthcare goals.

## 2024-12-12 ENCOUNTER — HOSPITAL ENCOUNTER (OUTPATIENT)
Dept: RADIOLOGY | Facility: CLINIC | Age: 69
Discharge: HOME | End: 2024-12-12
Payer: MEDICARE

## 2024-12-12 ENCOUNTER — OFFICE VISIT (OUTPATIENT)
Dept: NEPHROLOGY | Facility: CLINIC | Age: 69
End: 2024-12-12
Payer: MEDICARE

## 2024-12-12 ENCOUNTER — LAB (OUTPATIENT)
Dept: LAB | Facility: LAB | Age: 69
End: 2024-12-12
Payer: MEDICARE

## 2024-12-12 VITALS
HEIGHT: 64 IN | BODY MASS INDEX: 32.4 KG/M2 | HEART RATE: 81 BPM | DIASTOLIC BLOOD PRESSURE: 76 MMHG | WEIGHT: 189.8 LBS | SYSTOLIC BLOOD PRESSURE: 125 MMHG

## 2024-12-12 DIAGNOSIS — R80.9 PROTEINURIA, UNSPECIFIED TYPE: ICD-10-CM

## 2024-12-12 DIAGNOSIS — N13.39 OTHER HYDRONEPHROSIS: Primary | ICD-10-CM

## 2024-12-12 DIAGNOSIS — R73.9 HIGH BLOOD SUGAR: ICD-10-CM

## 2024-12-12 DIAGNOSIS — R82.2 BILIRUBINURIA: ICD-10-CM

## 2024-12-12 LAB
ANION GAP SERPL CALC-SCNC: 13 MMOL/L (ref 10–20)
BUN SERPL-MCNC: 10 MG/DL (ref 6–23)
C3 SERPL-MCNC: 152 MG/DL (ref 87–200)
C4 SERPL-MCNC: 52 MG/DL (ref 10–50)
CALCIUM SERPL-MCNC: 9.4 MG/DL (ref 8.6–10.6)
CHLORIDE SERPL-SCNC: 98 MMOL/L (ref 98–107)
CO2 SERPL-SCNC: 30 MMOL/L (ref 21–32)
CREAT SERPL-MCNC: 0.77 MG/DL (ref 0.5–1.05)
CREAT UR-MCNC: 33.1 MG/DL (ref 20–320)
DSDNA AB SER-ACNC: <1 IU/ML
EGFRCR SERPLBLD CKD-EPI 2021: 84 ML/MIN/1.73M*2
EST. AVERAGE GLUCOSE BLD GHB EST-MCNC: 126 MG/DL
GLUCOSE SERPL-MCNC: 104 MG/DL (ref 74–99)
HBA1C MFR BLD: 6 %
HBV SURFACE AG SERPL QL IA: NONREACTIVE
HCV AB SER QL: NONREACTIVE
MICROALBUMIN UR-MCNC: 55 MG/L
MICROALBUMIN/CREAT UR: 166.2 UG/MG CREAT
POTASSIUM SERPL-SCNC: 4.1 MMOL/L (ref 3.5–5.3)
PROT SERPL-MCNC: 7.1 G/DL (ref 6.4–8.2)
PROT UR-ACNC: 11 MG/DL (ref 5–25)
RBC #/AREA URNS AUTO: NORMAL /HPF
SODIUM SERPL-SCNC: 137 MMOL/L (ref 136–145)
WBC #/AREA URNS AUTO: NORMAL /HPF

## 2024-12-12 PROCEDURE — 76770 US EXAM ABDO BACK WALL COMP: CPT

## 2024-12-12 PROCEDURE — 86803 HEPATITIS C AB TEST: CPT

## 2024-12-12 PROCEDURE — 84156 ASSAY OF PROTEIN URINE: CPT

## 2024-12-12 PROCEDURE — 83521 IG LIGHT CHAINS FREE EACH: CPT

## 2024-12-12 PROCEDURE — 86160 COMPLEMENT ANTIGEN: CPT

## 2024-12-12 PROCEDURE — 84155 ASSAY OF PROTEIN SERUM: CPT

## 2024-12-12 PROCEDURE — 99203 OFFICE O/P NEW LOW 30 MIN: CPT | Performed by: INTERNAL MEDICINE

## 2024-12-12 PROCEDURE — 84165 PROTEIN E-PHORESIS SERUM: CPT

## 2024-12-12 PROCEDURE — 87340 HEPATITIS B SURFACE AG IA: CPT

## 2024-12-12 PROCEDURE — 76770 US EXAM ABDO BACK WALL COMP: CPT | Performed by: RADIOLOGY

## 2024-12-12 PROCEDURE — 86038 ANTINUCLEAR ANTIBODIES: CPT

## 2024-12-12 PROCEDURE — 82043 UR ALBUMIN QUANTITATIVE: CPT

## 2024-12-12 PROCEDURE — 1159F MED LIST DOCD IN RCRD: CPT | Performed by: INTERNAL MEDICINE

## 2024-12-12 PROCEDURE — 3008F BODY MASS INDEX DOCD: CPT | Performed by: INTERNAL MEDICINE

## 2024-12-12 PROCEDURE — 80048 BASIC METABOLIC PNL TOTAL CA: CPT

## 2024-12-12 PROCEDURE — 86225 DNA ANTIBODY NATIVE: CPT

## 2024-12-12 PROCEDURE — 82570 ASSAY OF URINE CREATININE: CPT

## 2024-12-12 PROCEDURE — 1036F TOBACCO NON-USER: CPT | Performed by: INTERNAL MEDICINE

## 2024-12-12 PROCEDURE — 83036 HEMOGLOBIN GLYCOSYLATED A1C: CPT

## 2024-12-12 PROCEDURE — 3074F SYST BP LT 130 MM HG: CPT | Performed by: INTERNAL MEDICINE

## 2024-12-12 PROCEDURE — 84166 PROTEIN E-PHORESIS/URINE/CSF: CPT

## 2024-12-12 PROCEDURE — 86335 IMMUNFIX E-PHORSIS/URINE/CSF: CPT

## 2024-12-12 PROCEDURE — 81001 URINALYSIS AUTO W/SCOPE: CPT

## 2024-12-12 PROCEDURE — 3078F DIAST BP <80 MM HG: CPT | Performed by: INTERNAL MEDICINE

## 2024-12-12 PROCEDURE — 86334 IMMUNOFIX E-PHORESIS SERUM: CPT

## 2024-12-12 NOTE — PROGRESS NOTES
Dayan Lopez   69 y.o.      Vitals:    12/12/24 1226   Weight: 86.1 kg (189 lb 12.8 oz)      MRN/Room: [id number]/Room/bed info not found      History Of Present Illness  Dayan Lopez is a 69 y.o. female presenting with proteinuria.     Past Medical History  She has a past medical history of Benign paroxysmal positional vertigo (07/28/2009), COVID, Dizziness, Endometrial cancer (Multi) (10/24/2023), MI (myocardial infarction) (Multi) (03/18/2013), Old myocardial infarction, Personal history of malignant neoplasm of other parts of uterus, Personal history of other diseases of the digestive system, Personal history of other specified conditions, and Uterine cancer (Multi) (10/24/2023).    Surgical History  She has a past surgical history that includes Hysterectomy (12/05/2014); Other surgical history (12/05/2014); Other surgical history (12/05/2014); Colonoscopy (10/11/2019); and Coronary angioplasty with stent (2007).     Social History  She reports that she has never smoked. She has never used smokeless tobacco. She reports current alcohol use of about 1.0 standard drink of alcohol per week. She reports that she does not use drugs.    Family History  Family History   Problem Relation Name Age of Onset    Heart disease Mother      Osteoporosis Mother      Liver cancer Father      Diabetes Father      Heart disease Sister      Heart disease Brother      Cancer Brother          hpv oral    Breast cancer Mother's Sister          Allergies  Meperidine and Meperidine (pf)      Meds:       Current Outpatient Medications   Medication Sig Dispense Refill    atorvastatin (Lipitor) 10 mg tablet Take 1 tablet (10 mg) by mouth once daily.      calcium carbonate (Tums) 200 mg calcium chewable tablet Chew 1 tablet (500 mg) once daily.      cetirizine (ZyrTEC) 5 mg chewable tablet Chew 2 tablets (10 mg) once daily.      levothyroxine (Synthroid, Levoxyl) 25 mcg tablet TAKE 1 TABLET (25 MCG) BY MOUTH DAILY 90 tablet 3     lutein 20 mg capsule Take by mouth.      metoprolol succinate XL (Toprol-XL) 25 mg 24 hr tablet Take 1 tablet (25 mg) by mouth every 12 hours.      omeprazole (PriLOSEC) 20 mg DR capsule       sacubitriL-valsartan (Entresto) 49-51 mg tablet 2 times a day.      sertraline (Zoloft) 25 mg tablet Take 1 tablet (25 mg) by mouth once daily.       No current facility-administered medications for this visit.         ROS:  The patient is awake and oriented. No dizziness or lightheadedness. No chills and no fever. No headaches. No nausea and no vomiting. No shortness of breath. No cough. No sputum. No chest pain. No chest tightness. No abdominal pain. No diarrhea and no constipation. No hematemesis or hemoptysis. No hematuria. No rectal bleeding. No melena. No epistaxis. No urinary symptoms. No flank pain. No leg edema. No leg pain. No weakness. No itching. Overall, the rest of the review of systems is also negative.  12 point review of systems otherwise negative as stated in HPI.        Physical Exam:        Vitals:    12/12/24 1226   BP: 125/76   Pulse: 81     General: The patient is awake, oriented, and is not in any distress.  Head and Neck: Normocephalic. No periorbital edema.  Eyes: Not icteric.   Respiratory: Symmetric air entry. Symmetric chest expansion.No respiratory distress.  Skin: No maculopapular rash.  Musculoskeletal: No peripheral edema in both left and right upper extremities.  No edema in either left or right lower extremities.  Neuro Exam: Speech is fluent. Moves extremities.        Blood Labs:  No results found for this or any previous visit (from the past 24 hours).   Lab Results   Component Value Date    GLUCOSE 119 (H) 11/22/2024    CALCIUM 9.3 11/22/2024     11/22/2024    K 4.2 11/22/2024    CO2 32 11/22/2024     11/22/2024    BUN 11 11/22/2024    CREATININE 0.86 11/22/2024       Imaging:        Assessment and Plan:  #1 proteinuria.  She had a recent spot urine albumin to creatinine ratio  which showed about 900 mg albuminuria.  No history of diabetes.  Last creatinine level is 0.8.  I asked for a spot urine protein to creatinine ratio as well as serology tests and urine and serum protein electrophoresis and immunofixation and free light chain assay.  Hemoglobin A1c will be checked.  I also asked for a kidney ultrasound.  Her blood pressure is good.  After get the results of above-mentioned test I will decide about appropriate treatment plan.    2.  Congestive heart failure.  She is on Entresto.    I will see her in about 2 to 3 weeks for follow-up.          Maurice Sierra MD  Senior Attending Physician  Director of Onco-Nephrology Program  Division of Nephrology & Hypertension  [facility]

## 2024-12-13 LAB
ANA SER QL HEP2 SUBST: NEGATIVE
KAPPA LC SERPL-MCNC: 1.54 MG/DL (ref 0.33–1.94)
KAPPA LC/LAMBDA SER: 0.91 {RATIO} (ref 0.26–1.65)
LAMBDA LC SERPL-MCNC: 1.69 MG/DL (ref 0.57–2.63)

## 2024-12-14 LAB — PLA2R IGG SERPL QL IF: NORMAL

## 2024-12-15 ENCOUNTER — TELEPHONE (OUTPATIENT)
Dept: PRIMARY CARE | Facility: CLINIC | Age: 69
End: 2024-12-15
Payer: MEDICARE

## 2024-12-15 LAB
ANCA AB PATTERN SER IF-IMP: NORMAL
ANCA IGG TITR SER IF: NORMAL {TITER}
MYELOPEROXIDASE AB SER-ACNC: 0 AU/ML (ref 0–19)
PROTEINASE3 AB SER-ACNC: 0 AU/ML (ref 0–19)

## 2024-12-16 ENCOUNTER — TELEPHONE (OUTPATIENT)
Dept: NEPHROLOGY | Facility: CLINIC | Age: 69
End: 2024-12-16
Payer: MEDICARE

## 2024-12-16 ENCOUNTER — TELEPHONE (OUTPATIENT)
Dept: PRIMARY CARE | Facility: CLINIC | Age: 69
End: 2024-12-16
Payer: MEDICARE

## 2024-12-16 DIAGNOSIS — M85.852 OSTEOPENIA OF NECK OF LEFT FEMUR: Primary | ICD-10-CM

## 2024-12-16 RX ORDER — CHOLECALCIFEROL (VITAMIN D3) 50 MCG
50 TABLET ORAL DAILY
Qty: 30 TABLET | Refills: 11 | Status: SHIPPED | OUTPATIENT
Start: 2024-12-16 | End: 2025-12-16

## 2024-12-16 NOTE — TELEPHONE ENCOUNTER
----- Message from Maurice Sierra sent at 12/14/2024 10:00 AM EST -----  Kidney US shows possibility of hydronephrosis. I put urology referral.

## 2024-12-16 NOTE — TELEPHONE ENCOUNTER
Patient called and left a message stating that she was returning your phone call please call patient back at [phone]

## 2024-12-16 NOTE — TELEPHONE ENCOUNTER
Spoke with Dayan lopez: osteopenia.   Treatment options discussed.   She would like to start vit d and calcium.     Encouraged weight bearing exercises.   Limit alcohol   She will consider medication alternatives and get back to me.

## 2024-12-17 ENCOUNTER — APPOINTMENT (OUTPATIENT)
Dept: NEPHROLOGY | Facility: CLINIC | Age: 69
End: 2024-12-17
Payer: MEDICARE

## 2024-12-17 VITALS
SYSTOLIC BLOOD PRESSURE: 118 MMHG | WEIGHT: 188.8 LBS | DIASTOLIC BLOOD PRESSURE: 78 MMHG | HEART RATE: 76 BPM | HEIGHT: 64 IN | BODY MASS INDEX: 32.23 KG/M2

## 2024-12-17 DIAGNOSIS — N13.39 OTHER HYDRONEPHROSIS: ICD-10-CM

## 2024-12-17 DIAGNOSIS — R80.9 PROTEINURIA, UNSPECIFIED TYPE: Primary | ICD-10-CM

## 2024-12-17 LAB
ALBUMIN MFR UR ELPH: 74.7 %
ALBUMIN: 4.1 G/DL (ref 3.4–5)
ALPHA 1 GLOBULIN: 0.3 G/DL (ref 0.2–0.6)
ALPHA 2 GLOBULIN: 0.8 G/DL (ref 0.4–1.1)
ALPHA1 GLOB MFR UR ELPH: 5.5 %
ALPHA2 GLOB MFR UR ELPH: 6.6 %
B-GLOBULIN MFR UR ELPH: 8.5 %
BETA GLOBULIN: 1 G/DL (ref 0.5–1.2)
GAMMA GLOB MFR UR ELPH: 4.7 %
GAMMA GLOBULIN: 0.9 G/DL (ref 0.5–1.4)
IMMUNOFIXATION COMMENT: NORMAL
IMMUNOFIXATION COMMENT: NORMAL
PATH REVIEW - SERUM IMMUNOFIXATION: NORMAL
PATH REVIEW - URINE IMMUNOFIXATION: NORMAL
PATH REVIEW-SERUM PROTEIN ELECTROPHORESIS: NORMAL
PATH REVIEW-URINE PROTEIN ELECTROPHORESIS: NORMAL
PROTEIN ELECTROPHORESIS COMMENT: NORMAL
URINE ELECTROPHORESIS COMMENT: NORMAL

## 2024-12-17 PROCEDURE — 3008F BODY MASS INDEX DOCD: CPT | Performed by: INTERNAL MEDICINE

## 2024-12-17 PROCEDURE — 99214 OFFICE O/P EST MOD 30 MIN: CPT | Performed by: INTERNAL MEDICINE

## 2024-12-17 PROCEDURE — 1159F MED LIST DOCD IN RCRD: CPT | Performed by: INTERNAL MEDICINE

## 2024-12-17 PROCEDURE — 3074F SYST BP LT 130 MM HG: CPT | Performed by: INTERNAL MEDICINE

## 2024-12-17 PROCEDURE — 1036F TOBACCO NON-USER: CPT | Performed by: INTERNAL MEDICINE

## 2024-12-17 PROCEDURE — 3078F DIAST BP <80 MM HG: CPT | Performed by: INTERNAL MEDICINE

## 2024-12-17 RX ORDER — DAPAGLIFLOZIN 10 MG/1
10 TABLET, FILM COATED ORAL DAILY
Qty: 90 TABLET | Refills: 3 | Status: SHIPPED | OUTPATIENT
Start: 2024-12-17 | End: 2025-12-17

## 2024-12-17 NOTE — PROGRESS NOTES
Dayan Gasparmacwilda   69 y.o.    @WT@  N/Room: [id number]/Room/bed info not found    Subjective:   The patient is being seen for a routine clinic follow-up of chronic kidney disease. Recently, the disease has been stable. Disease complications:  No hyperkalemia, no hypocalcemia, no hyperphosphatemia, no metabolic acidosis, no coagulopathy, no uremic encephalopathy, no neuropathy and no renal osteodystrophy. The patient is currently asymptomatic. No associated symptoms are reported.       Meds:   Current Outpatient Medications   Medication Sig Dispense Refill    atorvastatin (Lipitor) 10 mg tablet Take 1 tablet (10 mg) by mouth once daily.      calcium carbonate (Tums) 200 mg calcium chewable tablet Chew 1 tablet (500 mg) once daily.      cetirizine (ZyrTEC) 5 mg chewable tablet Chew 1 tablet (5 mg) once daily.      cholecalciferol (Vitamin D-3) 50 MCG (2000 UT) tablet Take 1 tablet (50 mcg) by mouth once daily. 30 tablet 11    levothyroxine (Synthroid, Levoxyl) 25 mcg tablet TAKE 1 TABLET (25 MCG) BY MOUTH DAILY 90 tablet 3    lutein 20 mg capsule Take by mouth.      metoprolol succinate XL (Toprol-XL) 25 mg 24 hr tablet Take 1 tablet (25 mg) by mouth every 12 hours.      omeprazole (PriLOSEC) 20 mg DR capsule       sacubitriL-valsartan (Entresto) 49-51 mg tablet 2 times a day.      sertraline (Zoloft) 25 mg tablet Take 1 tablet (25 mg) by mouth once daily.       No current facility-administered medications for this visit.          ROS:  The patient is awake and oriented. No dizziness or lightheadedness. No chills and no fever. No headaches. No nausea and no vomiting. No shortness of breath. No cough. No sputum. No chest pain. No chest tightness. No abdominal pain. No diarrhea and no constipation. No hematemesis or hemoptysis. No hematuria. No rectal bleeding. No melena. No epistaxis. No urinary symptoms. No flank pain. No leg edema. No leg pain. No weakness. No itching. Overall, the rest of the review of systems is  also negative.  12 point review of systems otherwise negative as stated in HPI.        Physical Examination:        Vitals:    12/17/24 1503   BP: 118/78   Pulse: 76     General: The patient is awake, oriented, and is not in any distress.  Head and Neck: Normocephalic. No periorbital edema.  Eyes: non-icteric  Respiratory: Symmetric air entry. Symmetric chest expansion.No respiratory distress.  Skin: No maculopapular rash.  Musculoskeletal: No peripheral edema in both left and right upper extremities.  No edema in either left or right lower extremities.  Neuro Exam: Speech is fluent. Moves extremities.    Imaging:  === 12/12/24 ===    US RENAL COMPLETE    - Impression -  Multiple bilateral renal parapelvic cysts versus hydronephrosis.  Recommend follow-up with CT urogram for further assessment. Yellow  Alert.    Critical Finding:  See findings. Notification was initiated on  12/13/2024 at 5:43 pm by  Corine Pfeiffer.  (**-[facility]-**) Instructions:    Signed by: Corine Pfeiffer 12/13/2024 5:43 PM  Dictation workstation:   [id number]       Blood Labs:  No results found for this or any previous visit (from the past 24 hours).   Lab Results   Component Value Date    PROTUR 100 (2+) (A) 12/02/2024      Lab Results   Component Value Date    GLUCOSE 104 (H) 12/12/2024    CALCIUM 9.4 12/12/2024     12/12/2024    K 4.1 12/12/2024    CO2 30 12/12/2024    CL 98 12/12/2024    BUN 10 12/12/2024    CREATININE 0.77 12/12/2024         Assessment and Plan:  #1 proteinuria.  She had a recent spot urine albumin to creatinine ratio which showed about 900 mg albuminuria.  No history of diabetes.  Last creatinine level is 0.8.  No microscopic hematuria.  Serology tests are all negative.  Normal urine and serum protein electrophoresis and immunofixation and free light chain assay.  Hemoglobin A1c is slightly on high side and it seems she has diabetes.  I put her on Farxiga.  She is on Entresto because of congestive heart  failure.    2.  Congestive heart failure.  She is on Entresto.    3.  Hydronephrosis.  Kidney ultrasound reports questionable hydronephrosis.  I referred her to urologist.    I will see her in about 6 months for follow-up.          Maurice Sierra MD  Senior Attending Physician  Director of Onco-Nephrology Program  Division of Nephrology & Hypertension  [facility]

## 2025-01-08 DIAGNOSIS — M85.852 OSTEOPENIA OF NECK OF LEFT FEMUR: ICD-10-CM

## 2025-01-08 RX ORDER — CHOLECALCIFEROL (VITAMIN D3) 50 MCG
50 TABLET ORAL DAILY
Qty: 30 TABLET | Refills: 11 | OUTPATIENT
Start: 2025-01-08 | End: 2026-01-08

## 2025-01-30 ENCOUNTER — TELEPHONE (OUTPATIENT)
Facility: CLINIC | Age: 70
End: 2025-01-30
Payer: MEDICARE

## 2025-04-22 ENCOUNTER — APPOINTMENT (OUTPATIENT)
Facility: CLINIC | Age: 70
End: 2025-04-22
Payer: MEDICARE

## 2025-04-24 ENCOUNTER — APPOINTMENT (OUTPATIENT)
Facility: CLINIC | Age: 70
End: 2025-04-24
Payer: MEDICARE

## 2025-04-24 VITALS
DIASTOLIC BLOOD PRESSURE: 66 MMHG | WEIGHT: 184 LBS | HEART RATE: 73 BPM | TEMPERATURE: 97.2 F | SYSTOLIC BLOOD PRESSURE: 109 MMHG | HEIGHT: 64 IN | BODY MASS INDEX: 31.41 KG/M2

## 2025-04-24 DIAGNOSIS — N13.39 OTHER HYDRONEPHROSIS: ICD-10-CM

## 2025-04-24 DIAGNOSIS — R33.8 OTHER RETENTION OF URINE: ICD-10-CM

## 2025-04-24 PROCEDURE — 1159F MED LIST DOCD IN RCRD: CPT | Performed by: STUDENT IN AN ORGANIZED HEALTH CARE EDUCATION/TRAINING PROGRAM

## 2025-04-24 PROCEDURE — 99203 OFFICE O/P NEW LOW 30 MIN: CPT | Performed by: STUDENT IN AN ORGANIZED HEALTH CARE EDUCATION/TRAINING PROGRAM

## 2025-04-24 PROCEDURE — 1036F TOBACCO NON-USER: CPT | Performed by: STUDENT IN AN ORGANIZED HEALTH CARE EDUCATION/TRAINING PROGRAM

## 2025-04-24 PROCEDURE — 1160F RVW MEDS BY RX/DR IN RCRD: CPT | Performed by: STUDENT IN AN ORGANIZED HEALTH CARE EDUCATION/TRAINING PROGRAM

## 2025-04-24 PROCEDURE — 3074F SYST BP LT 130 MM HG: CPT | Performed by: STUDENT IN AN ORGANIZED HEALTH CARE EDUCATION/TRAINING PROGRAM

## 2025-04-24 PROCEDURE — 3078F DIAST BP <80 MM HG: CPT | Performed by: STUDENT IN AN ORGANIZED HEALTH CARE EDUCATION/TRAINING PROGRAM

## 2025-04-24 PROCEDURE — 51798 US URINE CAPACITY MEASURE: CPT | Performed by: STUDENT IN AN ORGANIZED HEALTH CARE EDUCATION/TRAINING PROGRAM

## 2025-04-24 PROCEDURE — 3008F BODY MASS INDEX DOCD: CPT | Performed by: STUDENT IN AN ORGANIZED HEALTH CARE EDUCATION/TRAINING PROGRAM

## 2025-04-24 NOTE — PROGRESS NOTES
Chief complaint:  Hydronephrosis  Referring physician:  Maurice Sierra MD     SUBJECTIVE:  HPI:  Dayan Lopez is a 69 y.o. female with a history of HTN, HLD, CAD s/p PCI not on a/c with CHF, hyperglycemia on dapagliflozin, uterine ca s/p hysterectomy, remote nephrolithiasis (passed stone) who presents for initial evaluation of abnormal renal US.    Recently worked up for proteinuria, Cr 0.8 with GFR 84.  Renal US reviewed, shows either bilateral hydronephrosis or bilateral parapelvic cysts, bladder decompressed.  Some urinary frequency since starting Farxiga but not overly bothersome.  DTF 5, nocturia x2, occasional FAROOQ no UUI.  No dysuria, hematuria.    PVR 0ml    Medical history:   has a past medical history of Benign paroxysmal positional vertigo (07/28/2009), COVID, Dizziness, Endometrial cancer (Multi) (10/24/2023), MI (myocardial infarction) (Multi) (03/18/2013), Old myocardial infarction, Personal history of malignant neoplasm of other parts of uterus, Personal history of other diseases of the digestive system, Personal history of other specified conditions, and Uterine cancer (Multi) (10/24/2023).   Surgical history:   has a past surgical history that includes Hysterectomy (12/05/2014); Other surgical history (12/05/2014); Other surgical history (12/05/2014); Colonoscopy (10/11/2019); and Coronary angioplasty with stent (2007).  Family history:  family history includes Breast cancer in her mother's sister; Cancer in her brother; Diabetes in her father; Heart disease in her brother, mother, and sister; Liver cancer in her father; Osteoporosis in her mother.  Social history:   reports that she has never smoked. She has never used smokeless tobacco. She reports current alcohol use of about 1.0 standard drink of alcohol per week. She reports that she does not use drugs.    Medications:    Current Outpatient Medications   Medication Instructions    atorvastatin (LIPITOR) 10 mg, Daily    calcium carbonate  "(Tums) 200 mg calcium chewable tablet 1 tablet, Daily    cetirizine (ZYRTEC) 5 mg, Daily    cholecalciferol (VITAMIN D-3) 50 mcg, oral, Daily    dapagliflozin propanediol (FARXIGA) 10 mg, oral, Daily    levothyroxine (SYNTHROID, LEVOXYL) 25 mcg, oral, Daily    lutein 20 mg capsule Take by mouth.    metoprolol succinate XL (Toprol-XL) 25 mg 24 hr tablet 1 tablet, Every 12 hours scheduled (0630,1830)    omeprazole (PriLOSEC) 20 mg DR capsule     sacubitriL-valsartan (Entresto) 49-51 mg tablet 2 times a day.    sertraline (ZOLOFT) 25 mg, Daily      Allergies:    RX Allergies[1]     ROS:  14-point review of systems negative except as noted above.    OBJECTIVE:  Visit Vitals  /66   Pulse 73   Temp 36.2 °C (97.2 °F)   Body mass index is 32.08 kg/m².    Physical exam  General:  No acute distress  HEENT:  EOMI  CV:  Regular rate  Pulm:  Nonlabored respirations  Abd:  Soft, non-distended  :  No suprapubic or CVA tenderness  MSK:  No contractures  Neuro:  Motor intact  Psych:  Appropriate affect    Labs:    Lab Results   Component Value Date    WBC 8.5 11/22/2024    HGB 13.5 11/22/2024    HCT 43.9 11/22/2024     11/22/2024    ALT 11 11/22/2024    AST 15 11/22/2024     12/12/2024    K 4.1 12/12/2024    CL 98 12/12/2024    CREATININE 0.77 12/12/2024    BUN 10 12/12/2024    CO2 30 12/12/2024    HGBA1C 6.0 (H) 12/12/2024     Urine Culture (no units)   Date Value   12/02/2024 No growth    No results found for: \"PSA\"    Imaging:  All imaging discussed in HPI was independently reviewed.    ASSESSMENT:  Bilateral hydronephrosis vs bilateral parapelvic cysts    Discussed need for further imaging with CTU to exclude bilateral hydronephrosis especially in setting of proteinuria, CKD2.    PLAN:  Complete previously ordered BMP  CTU after    Follow-up about 2 mos review CTU    Brett Dave MD    Problem List Items Addressed This Visit    None  Visit Diagnoses         Other hydronephrosis        Relevant Orders "    Post-Void Residual (Completed)    CT urography w 3D volume rendered imaging      Other retention of urine        Relevant Orders    Post-Void Residual (Completed)                  [1]   Allergies  Allergen Reactions    Meperidine Other    Meperidine (Pf) Hives and Itching

## 2025-04-29 LAB
NON-UH HIE BUN/CREAT RATIO: 13.3
NON-UH HIE BUN: 12 MG/DL (ref 9–23)
NON-UH HIE CALCIUM: 9.6 MG/DL (ref 8.7–10.4)
NON-UH HIE CALCULATED OSMOLALITY: 282 MOSM/KG (ref 275–295)
NON-UH HIE CHLORIDE: 102 MMOL/L (ref 98–107)
NON-UH HIE CO2, VENOUS: 30 MMOL/L (ref 20–31)
NON-UH HIE CREATININE RANDOM, U: 155.5 MG/DL
NON-UH HIE CREATININE: 0.9 MG/DL (ref 0.5–0.8)
NON-UH HIE GFR AA: >60
NON-UH HIE GLOMERULAR FILTRATION RATE: >60 ML/MIN/1.73M?
NON-UH HIE GLUCOSE: 111 MG/DL (ref 74–106)
NON-UH HIE K: 4 MMOL/L (ref 3.5–5.1)
NON-UH HIE NA: 141 MMOL/L (ref 135–145)
NON-UH HIE TOTAL PROTEIN, RANDOM URINE: 31 MG/DL (ref 1–14)
NON-UH HIE URINE TOTAL PROTEIN / CREAT RATIO RANDOM: 0.2 MG/MG

## 2025-05-06 ENCOUNTER — HOSPITAL ENCOUNTER (OUTPATIENT)
Dept: RADIOLOGY | Facility: CLINIC | Age: 70
Discharge: HOME | End: 2025-05-06
Payer: MEDICARE

## 2025-05-06 DIAGNOSIS — N13.39 OTHER HYDRONEPHROSIS: ICD-10-CM

## 2025-05-06 PROCEDURE — 2550000001 HC RX 255 CONTRASTS: Mod: JZ | Performed by: STUDENT IN AN ORGANIZED HEALTH CARE EDUCATION/TRAINING PROGRAM

## 2025-05-06 PROCEDURE — 74178 CT ABD&PLV WO CNTR FLWD CNTR: CPT

## 2025-05-06 RX ADMIN — IOHEXOL 100 ML: 350 INJECTION, SOLUTION INTRAVENOUS at 14:13

## 2025-05-08 ENCOUNTER — TELEPHONE (OUTPATIENT)
Dept: OBSTETRICS AND GYNECOLOGY | Facility: CLINIC | Age: 70
End: 2025-05-08
Payer: MEDICARE

## 2025-05-08 DIAGNOSIS — E03.9 HYPOTHYROIDISM, UNSPECIFIED TYPE: ICD-10-CM

## 2025-05-12 RX ORDER — LEVOTHYROXINE SODIUM 25 UG/1
25 TABLET ORAL DAILY
Qty: 90 TABLET | Refills: 0 | Status: SHIPPED | OUTPATIENT
Start: 2025-05-12

## 2025-05-22 ENCOUNTER — TELEPHONE (OUTPATIENT)
Dept: PRIMARY CARE | Facility: CLINIC | Age: 70
End: 2025-05-22
Payer: MEDICARE

## 2025-05-22 DIAGNOSIS — R91.1 LUNG NODULE: Primary | ICD-10-CM

## 2025-05-22 DIAGNOSIS — D38.1 NEOPLASM OF UNCERTAIN BEHAVIOR OF LUNG: ICD-10-CM

## 2025-05-22 NOTE — TELEPHONE ENCOUNTER
Spoke with Dayan re: CT urography dated 5/7/2025.  Incidental finding:Left lower lobe indeterminate elongated tubular nodular lesion measuring 1 x 2.5 cm in transverse and CC dimensions. Considerations include mucous plug, endobronchial lesion and pulmonary nodule.    Recommend PET CT scan  Referral placed to Dr. Srivastava for further evaluation.

## 2025-05-23 PROBLEM — N13.30 HYDRONEPHROSIS DETERMINED BY ULTRASOUND: Status: ACTIVE | Noted: 2025-05-23

## 2025-05-23 PROBLEM — J34.89 NASAL DRYNESS: Status: ACTIVE | Noted: 2023-11-09

## 2025-06-02 ENCOUNTER — HOSPITAL ENCOUNTER (OUTPATIENT)
Dept: RADIOLOGY | Facility: CLINIC | Age: 70
Discharge: HOME | End: 2025-06-02
Payer: MEDICARE

## 2025-06-02 ENCOUNTER — OFFICE VISIT (OUTPATIENT)
Dept: PRIMARY CARE | Facility: CLINIC | Age: 70
End: 2025-06-02
Payer: MEDICARE

## 2025-06-02 VITALS
WEIGHT: 180 LBS | HEART RATE: 72 BPM | HEIGHT: 64 IN | OXYGEN SATURATION: 94 % | SYSTOLIC BLOOD PRESSURE: 135 MMHG | BODY MASS INDEX: 30.73 KG/M2 | DIASTOLIC BLOOD PRESSURE: 76 MMHG | RESPIRATION RATE: 10 BRPM | TEMPERATURE: 97.7 F

## 2025-06-02 DIAGNOSIS — R91.1 LUNG NODULE: ICD-10-CM

## 2025-06-02 DIAGNOSIS — E03.9 HYPOTHYROIDISM, UNSPECIFIED TYPE: ICD-10-CM

## 2025-06-02 DIAGNOSIS — K86.9 LESION OF PANCREAS (HHS-HCC): ICD-10-CM

## 2025-06-02 DIAGNOSIS — D38.1 NEOPLASM OF UNCERTAIN BEHAVIOR OF LUNG: ICD-10-CM

## 2025-06-02 DIAGNOSIS — R94.8 ABNORMAL POSITRON EMISSION TOMOGRAPHY (PET) SCAN: Primary | ICD-10-CM

## 2025-06-02 PROCEDURE — 3078F DIAST BP <80 MM HG: CPT | Performed by: NURSE PRACTITIONER

## 2025-06-02 PROCEDURE — 3008F BODY MASS INDEX DOCD: CPT | Performed by: NURSE PRACTITIONER

## 2025-06-02 PROCEDURE — 99214 OFFICE O/P EST MOD 30 MIN: CPT | Performed by: NURSE PRACTITIONER

## 2025-06-02 PROCEDURE — 1126F AMNT PAIN NOTED NONE PRSNT: CPT | Performed by: NURSE PRACTITIONER

## 2025-06-02 PROCEDURE — 78815 PET IMAGE W/CT SKULL-THIGH: CPT | Mod: PET TUMOR INIT TX STRAT | Performed by: RADIOLOGY

## 2025-06-02 PROCEDURE — 1159F MED LIST DOCD IN RCRD: CPT | Performed by: NURSE PRACTITIONER

## 2025-06-02 PROCEDURE — 3430000001 HC RX 343 DIAGNOSTIC RADIOPHARMACEUTICALS: Performed by: NURSE PRACTITIONER

## 2025-06-02 PROCEDURE — 78815 PET IMAGE W/CT SKULL-THIGH: CPT | Mod: PI

## 2025-06-02 PROCEDURE — A9552 F18 FDG: HCPCS | Performed by: NURSE PRACTITIONER

## 2025-06-02 PROCEDURE — 3075F SYST BP GE 130 - 139MM HG: CPT | Performed by: NURSE PRACTITIONER

## 2025-06-02 RX ORDER — LEVOTHYROXINE SODIUM 25 UG/1
25 TABLET ORAL DAILY
Qty: 90 TABLET | Refills: 1 | Status: SHIPPED | OUTPATIENT
Start: 2025-06-02

## 2025-06-02 RX ORDER — FLUDEOXYGLUCOSE F 18 200 MCI/ML
14.6 INJECTION, SOLUTION INTRAVENOUS
Status: COMPLETED | OUTPATIENT
Start: 2025-06-02 | End: 2025-06-02

## 2025-06-02 RX ADMIN — FLUDEOXYGLUCOSE F 18 14.6 MILLICURIE: 200 INJECTION, SOLUTION INTRAVENOUS at 08:15

## 2025-06-02 ASSESSMENT — ENCOUNTER SYMPTOMS
OCCASIONAL FEELINGS OF UNSTEADINESS: 0
DEPRESSION: 0
LOSS OF SENSATION IN FEET: 0

## 2025-06-02 ASSESSMENT — ANXIETY QUESTIONNAIRES
GAD7 TOTAL SCORE: 0
5. BEING SO RESTLESS THAT IT IS HARD TO SIT STILL: NOT AT ALL
6. BECOMING EASILY ANNOYED OR IRRITABLE: NOT AT ALL
7. FEELING AFRAID AS IF SOMETHING AWFUL MIGHT HAPPEN: NOT AT ALL
2. NOT BEING ABLE TO STOP OR CONTROL WORRYING: NOT AT ALL
1. FEELING NERVOUS, ANXIOUS, OR ON EDGE: NOT AT ALL
4. TROUBLE RELAXING: NOT AT ALL
3. WORRYING TOO MUCH ABOUT DIFFERENT THINGS: NOT AT ALL
IF YOU CHECKED OFF ANY PROBLEMS ON THIS QUESTIONNAIRE, HOW DIFFICULT HAVE THESE PROBLEMS MADE IT FOR YOU TO DO YOUR WORK, TAKE CARE OF THINGS AT HOME, OR GET ALONG WITH OTHER PEOPLE: NOT DIFFICULT AT ALL

## 2025-06-02 ASSESSMENT — LIFESTYLE VARIABLES
HOW MANY STANDARD DRINKS CONTAINING ALCOHOL DO YOU HAVE ON A TYPICAL DAY: PATIENT DOES NOT DRINK
AUDIT-C TOTAL SCORE: 0
HOW OFTEN DO YOU HAVE SIX OR MORE DRINKS ON ONE OCCASION: NEVER
SKIP TO QUESTIONS 9-10: 1
HOW OFTEN DO YOU HAVE A DRINK CONTAINING ALCOHOL: NEVER

## 2025-06-02 ASSESSMENT — PATIENT HEALTH QUESTIONNAIRE - PHQ9
SUM OF ALL RESPONSES TO PHQ9 QUESTIONS 1 & 2: 0
2. FEELING DOWN, DEPRESSED OR HOPELESS: NOT AT ALL
1. LITTLE INTEREST OR PLEASURE IN DOING THINGS: NOT AT ALL

## 2025-06-02 ASSESSMENT — PAIN SCALES - GENERAL: PAINLEVEL_OUTOF10: 0-NO PAIN

## 2025-06-02 ASSESSMENT — COLUMBIA-SUICIDE SEVERITY RATING SCALE - C-SSRS
1. IN THE PAST MONTH, HAVE YOU WISHED YOU WERE DEAD OR WISHED YOU COULD GO TO SLEEP AND NOT WAKE UP?: NO
2. HAVE YOU ACTUALLY HAD ANY THOUGHTS OF KILLING YOURSELF?: NO
6. HAVE YOU EVER DONE ANYTHING, STARTED TO DO ANYTHING, OR PREPARED TO DO ANYTHING TO END YOUR LIFE?: NO

## 2025-06-02 NOTE — PROGRESS NOTES
Subjective   Patient ID: Dayan Lopez is a 69 y.o. female who presents for Results (tests results).  HPI 69-year-old female with past medical history of hyperlipidemia, CAD, Hypertension, ACS, NSTEMI MI, macular degeneration, hypothyroidism, GERD, anxiety presents today to review results.      PET CT lung cancer initial diagnosis 6/2/2025  Non FDG avid left lower lobe pulmonary nodule as seen on CT dated  05/06/2025. Otherwise, no concerning FDG avid bilateral pulmonary  nodules. *Calcified non FDG avid left hilar, subcarinal and  paraesophageal lymph nodes are seen. Mildly FDG avid right hilar  lymph node with a SUV max 2.3 seen. *Non-specific diffuse FDG uptake  is seen within the esophagus, likely esophagitis. *Note is made of  large hiatal hernia.    *Focal FDG uptake within pancreatic tail with SUV max 4.2 is seen. No  FDG avid abdominopelvic lymphadenopathy. *Bilateral adrenal glands  are unremarkable. *Physiologic radiotracer uptake is present in the  liver and spleen with excretion into the bowel loops and the  genitourinary tract. Note is made of bilateral renal cortical cysts.  Intense FDG uptake with a SUV max 8.0 seen within the ileocecal  junction.    CT urography with 3D volume rendered imaging dated 5-6 2025  Large hiatal hernia is present along with gastric wall thickening.  There is compressive atelectasis and or scar in the left lower lobe  central aspect near the hiatal hernia. There is partial visualization  of coarse granulomatous calcifications in the left hilar region. A  left lower lobe posteromedial basilar indeterminate elongated tubular  nodular lesion measures 1 x 2.5 cm in transverse and CC dimensions  respectively.  Considerations include mucous plug, endobronchial lesion and pulmonary nodule.    Review of Systems  Review of systems: Present-feeling well. Not present-chills, fatigue and fever.  Skin: Not present- new lesions and rash.  HEENT: Not present-headache, ear pain,  "seasonal allergies, nasal congestion, and sore throat.  Neck: Not present-neck pain, neck stiffness and swollen glands.  Respiratory: Not present-difficulty breathing, cough, bloody sputum.  Cardiovascular: Not present-abnormal blood pressure, chest pain, edema, dyspnea on exertion.  Gastrointestinal: Not present-abdominal pain, bloody or very black stools, nausea and vomiting.  Genitourinary: Not present-change in bladder habits, hematuria, flank pain and dysuria.  Musculoskeletal: Not present-joint pain, joint swelling, joint redness.    Objective   /76 (BP Location: Right arm, Patient Position: Sitting)   Pulse 72   Temp 36.5 °C (97.7 °F) (Temporal)   Resp 10   Ht 1.613 m (5' 3.5\")   Wt 81.6 kg (180 lb)   SpO2 94%   BMI 31.39 kg/m²      Physical Exam  Gen.: Mental status-alert. Gen. appearance-cooperative, well groomed and consistent with stated age. Not in acute distress or sickly. Orientation-oriented to time, place, purpose and person. Build and nutrition-well-nourished and well-developed. Hydration-well-hydrated.    Integumentary: Color-normal coloration skin. Skin moisture-normal skin moisture.    Head and neck: Head-normocephalic, atraumatic with no lesions or palpable masses. Face-atraumatic. Neck-full range of motion and subtle. No lymphadenopathy and no nuchal rigidity.     Chest and lung exam: Chest and lung exam reveals-normal excursion with symmetric chest walls, quiet, even and easy respiratory effort with no use of accessory muscles.  Auscultation-normal breath sounds, no adventitious lung sounds and normal vocal resonance. Chest wall is normal in shape and non-tender.    Cardiovascular: Auscultation: Regular rate and rhythm. Heart sounds-normal heart sounds, S1-S2. No murmurs appreciated. Carotid arteries normal and without bruit.    Abdomen: Non-tender, no rigidity, and no palpable masses. There is no hepatosplenomegaly. Auscultation-auscultation of the abdomen reveals normal bowel " sounds throughout.     Peripheral vascular: Lower extremity-inspection is normal bilaterally. Normal temperature and no edema bilaterally.    Musculoskeletal: Examination of the spine with no step-offs or point tenderness.  Full range of motion of the spine without pain or difficulty. Right lower extremity-normal strength and tone, normal range of motion without pain. Left lower extremity-normal strength and tone, normal range of motion without pain.  Assessment/Plan   Diagnoses and all orders for this visit:  Abnormal positron emission tomography (PET) scan  -     Referral to Gastroenterology; Future  -     Referral To Hematology and Oncology; Future  Lesion of pancreas (HHS-HCC)  -     Referral to Gastroenterology; Future  -     Referral To Hematology and Oncology; Future  Hypothyroidism, unspecified type  -     levothyroxine (Synthroid, Levoxyl) 25 mcg tablet; Take 1 tablet (25 mcg) by mouth once daily.  Lung nodule  -     Referral to Oncology

## 2025-06-03 ENCOUNTER — APPOINTMENT (OUTPATIENT)
Dept: PRIMARY CARE | Facility: CLINIC | Age: 70
End: 2025-06-03
Payer: MEDICARE

## 2025-06-08 NOTE — PATIENT INSTRUCTIONS
Focal FDG uptake within pancreatic tail, concerning for neoplastic  process. Further evaluation with MRI is recommended.  FDG avid sclerotic lesion with the left diana sacrum, concerning  for metastasis.  Intense FDG uptake within ileocecal junction non-specific.  Continued attention on follow-up imaging is recommended.  Non FDG avid left lower lobe pulmonary nodule, non-specific.  Follow-up with diagnostic CT chest to document interval  resolution/stability.  FDG avid right hilar lymph node, likely reactive.  Calcified non FDG avid left hilar, subcarinal and paraesophageal  lymph nodes, likely inflammatory.    Referral placed for gastro and oncology.   She is scheduled for follow up with Dr. Srivastava - thoracic surgery.  Patient education provided.  Questions answered.

## 2025-06-09 ENCOUNTER — APPOINTMENT (OUTPATIENT)
Dept: RADIOLOGY | Facility: CLINIC | Age: 70
End: 2025-06-09
Payer: MEDICARE

## 2025-06-10 ENCOUNTER — APPOINTMENT (OUTPATIENT)
Dept: PRIMARY CARE | Facility: CLINIC | Age: 70
End: 2025-06-10
Payer: MEDICARE

## 2025-06-17 ENCOUNTER — APPOINTMENT (OUTPATIENT)
Dept: NEPHROLOGY | Facility: CLINIC | Age: 70
End: 2025-06-17
Payer: MEDICARE

## 2025-06-17 ENCOUNTER — APPOINTMENT (OUTPATIENT)
Dept: HEMATOLOGY/ONCOLOGY | Facility: HOSPITAL | Age: 70
End: 2025-06-17
Payer: MEDICARE

## 2025-06-17 VITALS
HEART RATE: 76 BPM | BODY MASS INDEX: 31.48 KG/M2 | SYSTOLIC BLOOD PRESSURE: 116 MMHG | WEIGHT: 184.4 LBS | HEIGHT: 64 IN | DIASTOLIC BLOOD PRESSURE: 77 MMHG

## 2025-06-17 DIAGNOSIS — R80.9 PROTEINURIA, UNSPECIFIED TYPE: ICD-10-CM

## 2025-06-17 DIAGNOSIS — N13.39 OTHER HYDRONEPHROSIS: Primary | ICD-10-CM

## 2025-06-17 PROCEDURE — 3074F SYST BP LT 130 MM HG: CPT | Performed by: INTERNAL MEDICINE

## 2025-06-17 PROCEDURE — 99213 OFFICE O/P EST LOW 20 MIN: CPT | Performed by: INTERNAL MEDICINE

## 2025-06-17 PROCEDURE — 1159F MED LIST DOCD IN RCRD: CPT | Performed by: INTERNAL MEDICINE

## 2025-06-17 PROCEDURE — 3078F DIAST BP <80 MM HG: CPT | Performed by: INTERNAL MEDICINE

## 2025-06-17 PROCEDURE — 3008F BODY MASS INDEX DOCD: CPT | Performed by: INTERNAL MEDICINE

## 2025-06-17 PROCEDURE — 1036F TOBACCO NON-USER: CPT | Performed by: INTERNAL MEDICINE

## 2025-06-17 NOTE — PROGRESS NOTES
Dayan Gasparpaolacory   69 y.o.    @@  [facility]/Room: [id number]/Room/bed info not found    Subjective:   The patient is being seen for a routine clinic follow-up of proteinuria.      Meds:   Current Medications[1]       ROS:  The patient is awake and oriented. No dizziness or lightheadedness. No chills and no fever. No headaches. No nausea and no vomiting. No shortness of breath. No cough. No chest pain. No abdominal pain. No diarrhea. No hematemesis or hemoptysis. No hematuria. No rectal bleeding. No melena. No epistaxis. No urinary symptoms. No flank pain. No leg edema. No itching. Overall, the rest of the review of systems is also negative.  12 point review of systems otherwise negative as stated in HPI.        Physical Examination:        Vitals:    06/17/25 1248   BP: 116/77   Pulse: 76     General: The patient is awake, oriented, and is not in any distress.  Head and Neck: Normocephalic. No periorbital edema.  Eyes: non-icteric  Respiratory: Symmetric chest expansion. No respiratory distress.  Skin: No maculopapular rash.  Musculoskeletal: No peripheral edema.  Neuro Exam: Speech is fluent. Moves extremities.    Imaging:  === 12/12/24 ===    US RENAL COMPLETE    - Impression -  Multiple bilateral renal parapelvic cysts versus hydronephrosis.  Recommend follow-up with CT urogram for further assessment. Yellow  Alert.    Critical Finding:  See findings. Notification was initiated on  12/13/2024 at 5:43 pm by  Corine Pfeiffer.  (**-YCF-**) Instructions:    Signed by: Corine Pfeiffer 12/13/2024 5:43 PM  Dictation workstation:   [id number]       Blood Labs:  No results found for this or any previous visit (from the past 24 hours).   Lab Results   Component Value Date    PROTUR 100 (2+) (A) 12/02/2024      Lab Results   Component Value Date    GLUCOSE 104 (H) 12/12/2024    CALCIUM 9.4 12/12/2024     12/12/2024    K 4.1 12/12/2024    CO2 30 12/12/2024    CL 98 12/12/2024    BUN 10 12/12/2024    CREATININE 0.77  12/12/2024             Assessment and Plan:  #1 proteinuria.  She had a recent spot urine albumin to creatinine ratio which showed about 900 mg albuminuria.  No history of diabetes.  Last creatinine level is 0.8.  No microscopic hematuria.  Serology tests are all negative.  Normal urine and serum protein electrophoresis and immunofixation and free light chain assay.  Hemoglobin A1c was slightly on high side.  I put her on Farxiga.  She is on Entresto because of congestive heart failure.  There is significant improvement in Lasix 40 urine protein to creatinine ratio did not show any more proteinuria.  Kidney function stays normal.     2.  Congestive heart failure.  She is on Entresto.     3.  Hydronephrosis.  Kidney ultrasound reported questionable hydronephrosis. CT urogram did not show any hydro.  She follows with urologist.     I will see her in about 6 months for follow-up.          Maurice Sierra MD  Senior Attending Physician  Director of Onco-Nephrology Program  Division of Nephrology & Hypertension  [facility]       [1]   Current Outpatient Medications   Medication Sig Dispense Refill    atorvastatin (Lipitor) 10 mg tablet Take 1 tablet (10 mg) by mouth once daily.      calcium carbonate (Tums) 200 mg calcium chewable tablet Chew 1 tablet once daily.      cetirizine (ZyrTEC) 5 mg chewable tablet Chew 1 tablet (5 mg) once daily. (Patient taking differently: Chew 1 tablet (5 mg) if needed.)      cholecalciferol (Vitamin D-3) 50 MCG (2000 UT) tablet Take 1 tablet (50 mcg) by mouth once daily. 30 tablet 11    dapagliflozin propanediol (Farxiga) 10 mg Take 1 tablet (10 mg) by mouth once daily. 90 tablet 3    levothyroxine (Synthroid, Levoxyl) 25 mcg tablet Take 1 tablet (25 mcg) by mouth once daily. 90 tablet 1    lutein 20 mg capsule Take by mouth.      metoprolol succinate XL (Toprol-XL) 25 mg 24 hr tablet Take 1 tablet (25 mg) by mouth every 12 hours.      omeprazole  (PriLOSEC) 20 mg DR capsule       sacubitriL-valsartan (Entresto) 49-51 mg tablet 2 times a day.      sertraline (Zoloft) 25 mg tablet Take 1 tablet (25 mg) by mouth once daily.       No current facility-administered medications for this visit.

## 2025-06-18 ENCOUNTER — TELEPHONE (OUTPATIENT)
Dept: HEMATOLOGY/ONCOLOGY | Facility: HOSPITAL | Age: 70
End: 2025-06-18
Payer: MEDICARE

## 2025-06-18 DIAGNOSIS — K86.89 MASS OF PANCREAS (HHS-HCC): Primary | ICD-10-CM

## 2025-06-18 NOTE — TELEPHONE ENCOUNTER
Per Gisella Marquez NP, she would like to order a STAT MRCP prior to their appointment on Friday 6/20/25. I contacted the patient and reviewed the procedure and the patient is agreeable. She denied any metal (no implanted devices, pacemakers, defibrillators, aneurysm clips or metal in eyes). She does have hardware in her right leg (plates and screws). She has had MRIs previously. Advised that scheduling will contact her with appointment and it may or may not be done prior to visit on Friday which is ok.

## 2025-06-19 ENCOUNTER — APPOINTMENT (OUTPATIENT)
Dept: UROLOGY | Facility: CLINIC | Age: 70
End: 2025-06-19
Payer: MEDICARE

## 2025-06-19 VITALS
SYSTOLIC BLOOD PRESSURE: 113 MMHG | BODY MASS INDEX: 31.28 KG/M2 | DIASTOLIC BLOOD PRESSURE: 73 MMHG | HEART RATE: 101 BPM | TEMPERATURE: 97.8 F | HEIGHT: 64 IN | WEIGHT: 183.2 LBS

## 2025-06-19 DIAGNOSIS — N28.1 BILATERAL RENAL CYSTS: Primary | ICD-10-CM

## 2025-06-19 PROCEDURE — 1159F MED LIST DOCD IN RCRD: CPT | Performed by: STUDENT IN AN ORGANIZED HEALTH CARE EDUCATION/TRAINING PROGRAM

## 2025-06-19 PROCEDURE — 3008F BODY MASS INDEX DOCD: CPT | Performed by: STUDENT IN AN ORGANIZED HEALTH CARE EDUCATION/TRAINING PROGRAM

## 2025-06-19 PROCEDURE — 99213 OFFICE O/P EST LOW 20 MIN: CPT | Performed by: STUDENT IN AN ORGANIZED HEALTH CARE EDUCATION/TRAINING PROGRAM

## 2025-06-19 PROCEDURE — 1160F RVW MEDS BY RX/DR IN RCRD: CPT | Performed by: STUDENT IN AN ORGANIZED HEALTH CARE EDUCATION/TRAINING PROGRAM

## 2025-06-19 PROCEDURE — 3074F SYST BP LT 130 MM HG: CPT | Performed by: STUDENT IN AN ORGANIZED HEALTH CARE EDUCATION/TRAINING PROGRAM

## 2025-06-19 PROCEDURE — 1036F TOBACCO NON-USER: CPT | Performed by: STUDENT IN AN ORGANIZED HEALTH CARE EDUCATION/TRAINING PROGRAM

## 2025-06-19 PROCEDURE — 3078F DIAST BP <80 MM HG: CPT | Performed by: STUDENT IN AN ORGANIZED HEALTH CARE EDUCATION/TRAINING PROGRAM

## 2025-06-19 ASSESSMENT — ENCOUNTER SYMPTOMS
LOSS OF SENSATION IN FEET: 0
OCCASIONAL FEELINGS OF UNSTEADINESS: 0
DEPRESSION: 0

## 2025-06-19 ASSESSMENT — PATIENT HEALTH QUESTIONNAIRE - PHQ9
1. LITTLE INTEREST OR PLEASURE IN DOING THINGS: NOT AT ALL
SUM OF ALL RESPONSES TO PHQ9 QUESTIONS 1 AND 2: 0
2. FEELING DOWN, DEPRESSED OR HOPELESS: NOT AT ALL

## 2025-06-19 NOTE — PROGRESS NOTES
Chief complaint:  Follow-up (CT)  Referring physician:  No ref. provider found     SUBJECTIVE:  HPI:  Dayan Lopez is a 69 y.o. female with a history of HTN, HLD, CAD s/p PCI not on a/c with CHF, hyperglycemia on dapagliflozin, uterine ca s/p hysterectomy, remote nephrolithiasis (passed stone) who presents for follow up of abnormal renal US.    6/19/25 - No symptomatic issues.  CTU reviewed - parapelvic cysts bilaterally, no hydro.  Incidentally noted a pulmonary nodule, PCP ordered PET-CT which showed some enhancement in the pancreas and sacrum.  4/24/25 - Recently worked up for proteinuria, Cr 0.8 with GFR 84.  Renal US reviewed, shows either bilateral hydronephrosis or bilateral parapelvic cysts, bladder decompressed.  Some urinary frequency since starting Farxiga but not overly bothersome.  DTF 5, nocturia x2, occasional FAROOQ no UUI.  No dysuria, hematuria.  PVR 0ml    Medical history:   has a past medical history of Allergic (1979), Anxiety (2008), Benign paroxysmal positional vertigo (07/28/2009), COVID, Dizziness, Endometrial cancer (Multi) (10/24/2023), GERD (gastroesophageal reflux disease) (1990?), Heart disease (2007), Hypertension (2007), MI (myocardial infarction) (Multi) (03/18/2013), Old myocardial infarction, Personal history of malignant neoplasm of other parts of uterus, Personal history of other diseases of the digestive system, Personal history of other specified conditions, and Uterine cancer (Multi) (10/24/2023).   Surgical history:   has a past surgical history that includes Hysterectomy (12/05/2014); Other surgical history (12/05/2014); Other surgical history (12/05/2014); Colonoscopy (10/11/2019); Coronary angioplasty with stent (2007); Cardiac catheterization (07/2007); and Fracture surgery (04/2000).  Family history:  family history includes Breast cancer in her mother's sister; Cancer in her brother, father, mother's brother, and mother's sister; Diabetes in her father; Heart disease  "in her brother, brother, mother, and sister; Liver cancer in her father; Osteoporosis in her mother; Stroke in her mother.  Social history:   reports that she has never smoked. She has never used smokeless tobacco. She reports current alcohol use of about 1.0 standard drink of alcohol per week. She reports that she does not use drugs.    Medications:    Current Outpatient Medications   Medication Instructions    atorvastatin (LIPITOR) 10 mg, Daily    calcium carbonate (Tums) 200 mg calcium chewable tablet 1 tablet, Daily    cetirizine (ZYRTEC) 5 mg, Daily    cholecalciferol (VITAMIN D-3) 50 mcg, oral, Daily    dapagliflozin propanediol (FARXIGA) 10 mg, oral, Daily    levothyroxine (SYNTHROID, LEVOXYL) 25 mcg, oral, Daily    lutein 20 mg capsule Take by mouth.    metoprolol succinate XL (Toprol-XL) 25 mg 24 hr tablet 1 tablet, Every 12 hours scheduled (0630,1830)    omeprazole (PriLOSEC) 20 mg DR capsule     sacubitriL-valsartan (Entresto) 49-51 mg tablet 2 times a day.    sertraline (ZOLOFT) 25 mg, Daily      Allergies:    RX Allergies[1]     ROS:  14-point review of systems negative except as noted above.    OBJECTIVE:  Visit Vitals  /73   Pulse 101   Temp 36.6 °C (97.8 °F)   Body mass index is 31.94 kg/m².    Physical exam  General:  No acute distress  HEENT:  EOMI  CV:  Regular rate  Pulm:  Nonlabored respirations  Abd:  Soft, non-distended  :  No suprapubic or CVA tenderness  MSK:  No contractures  Neuro:  Motor intact  Psych:  Appropriate affect    Labs:    Lab Results   Component Value Date    WBC 8.5 11/22/2024    HGB 13.5 11/22/2024    HCT 43.9 11/22/2024     11/22/2024    ALT 11 11/22/2024    AST 15 11/22/2024     12/12/2024    K 4.1 12/12/2024    CL 98 12/12/2024    CREATININE 0.77 12/12/2024    BUN 10 12/12/2024    CO2 30 12/12/2024    HGBA1C 6.0 (H) 12/12/2024     Urine Culture (no units)   Date Value   12/02/2024 No growth    No results found for: \"PSA\"    Imaging:  All imaging " discussed in HPI was independently reviewed.    ASSESSMENT:  Bilateral parapelvic cysts    Fortunately no hydronephrosis, so does not require any follow up.    PLAN:  No further workup required    Follow-up with me as needed    Brett Dave MD    Problem List Items Addressed This Visit    None  Visit Diagnoses         Bilateral renal cysts    -  Primary                  [1]   Allergies  Allergen Reactions    Meperidine Other    Meperidine (Pf) Hives and Itching

## 2025-06-19 NOTE — PROGRESS NOTES
[facility]  Diagnostic Clinic  New Patient Virtual Visit    Date of Service: 25      Patient Name: Dayan Lopez   : 1955  MRN: [id number]   PCP: MEMO Menjivar   Referred by: MEMO Menjivar     Problem: Abnormal PET findings    HPI: Dayan Lopez is a 69 y.o. year old female w/ PMH HTN, HPL, CAD, ACS, NSTEMI, macular degeneration, hypothyroidism, GERD, and anxiety, who presents to [facility] with abnormal PET findings, referral placed by PCP MEMO Menjivar.     Ms. Lopez had office visit w/ her PCP at end of 2025 for LLL tubular nodular lesion. PET/CT was ordered for further evaluation, which was completed 25 - showed focal FDG uptake w/in pancreatic tail (SUV 4.2), c/f neoplastic process, and focal FDG uptake (SUV 6.7) w/in the left hemipelvis, corresponding to a sclerotic lesion c/f metastasis. Also shows intense FDG uptake within the ileocecal junction (SUV 8), non-specific. She was referred to oncology for further evaluation.    Ms. Lopez and her  Jensen present virtually via telephone to clinic today. She endorses feeling in her overall usual state of health, perhaps more fatigued than typical. Otherwise denies any complaints. Never smoker. She has recently lost 8 lbs intentionally on Farxiga. Denies fevers/chills, night sweats, unintentional weight loss, abd pain, and n/v/d. Lives in [place] w/ her .    History of tobacco use:   Never.    Personal history of malignancy:   None.  Prior history of uncertain endometrial lesion, underwent ANTOINETTE/BSO ~20 years ago, no further treatment or definitive diagnosis of malignancy.    Family history of malignancy:   Brother - esophageal cancer.  Father - liver cancer.    Health Maintenance:  Last screening colonoscopy 10/26/23: Exam normal, no specimens collected.    PATHOLOGY:  N/A     IMAGING:    === 25 ===    NM PET/CT Lung Cancer  Diagnosis    IMPRESSION:  1. Focal FDG uptake within pancreatic tail, concerning for neoplastic  process. Further evaluation with MRI is recommended.  2. FDG avid sclerotic lesion with the left diana sacrum, concerning  for metastasis.  3. Intense FDG uptake within ileocecal junction non-specific.  Continued attention on follow-up imaging is recommended.  4. Non FDG avid left lower lobe pulmonary nodule, non-specific.  Follow-up with diagnostic CT chest to document interval  resolution/stability.  5. FDG avid right hilar lymph node, likely reactive.  6. Calcified non FDG avid left hilar, subcarinal and paraesophageal  lymph nodes, likely inflammatory.      I personally reviewed the images/study and I agree with the findings  as stated by Mirella Young MD.  This study was interpreted at  [facility], [place], [place].      Signed by: Jeancarlos Lujan 6/2/2025 10:57 AM  Dictation workstation:   [id number]    === 05/06/25 ===    CT UROGRAPHY WITH 3D VOLUME RENDERED IMAGING    - Impression -  Multiple central renal parapelvic cysts bilaterally as well as a  right renal lower pole 1.1 cm cortical cyst.    No hydroureteronephrosis bilaterally. No renal collecting system or  ureteral filling defect identified. No renal or ureteral calculi.    No appreciable urinary bladder abnormality.    Large hiatal hernia. Nonspecific gastric wall thickening may be  partially exaggerated by underdistention.    Colonic diverticulosis without acute diverticulitis.    Tiny calcified gallstone in the dependent portion toward the fundus.    Left lower lobe indeterminate elongated tubular nodular lesion  measuring 1 x 2.5 cm in transverse and CC dimensions. Considerations  include mucous plug, endobronchial lesion and pulmonary nodule.    MACRO:  Incidental Finding:  A solid non-calcified pulmonary nodule measuring  greater than 8 mm.  (**-YCF-**)    Instructions:  Consider short term follow up non-contrast Chest  CT at  3 months, PET/CT or tissue sampling. Note, PET/CT may be limited in  low grade malignancy, nodules <1 cm size or those located close to  diaphragm. (Shaq Cisneros et al., Guidelines for management of  incidental pulmonary nodules detected on CT images: From the  Fleischner Society 2017, Radiology. 2017 Jul;284 (1):228-243.)  [email or web address]    Signed by: Luke Mckeon 5/7/2025 10:02 AM  Dictation workstation:   [id number]      LABS:  4/29/25 BMP reviewed.    PAST MEDICAL HISTORY:  Medical History[1]    PAST SURGICAL HISTORY:  Surgical History[2]    SOCIAL HISTORY:  Social Connections: Not on File (3/16/2021)    Received from [employer]     Social Connections and Isolation: 0       FAMILY HISTORY:  Family History[3]    MEDICATIONS:  Medications Ordered Prior to Encounter[4]      REVIEW OF SYSTEMS:  Review of Systems   Constitutional:  Positive for fatigue. Negative for appetite change, chills, fever and unexpected weight change.        See HPI for more details   HENT:   Negative for lump/mass.    Respiratory:  Negative for cough and shortness of breath.         +recent bout of bronchitis w/ cough, wheezing, now resolved   Cardiovascular:  Negative for chest pain.   Gastrointestinal:  Negative for abdominal pain, constipation, diarrhea, nausea and vomiting.   Endocrine:        Denies night sweats   Neurological:  Negative for extremity weakness and speech difficulty.     OBJECTIVE:  There were no vitals taken for this visit.  Deferred due to virtual telephone visit.     ASSESSMENT:  Dayan Lopez is a 69 y.o. year old female w/ PMH HTN, HPL, CAD, ACS, NSTEMI, macular degeneration, hypothyroidism, GERD, and anxiety, who presents to [facility] with FDG avid pancreas tail mass, sacral lytic lesion & ileocecal junction, of uncertain etiology but concerning for malignancy.    PLAN:  1. Mass of pancreas ([facility]) (Primary)  2. Lung nodule  3. Lytic lesion of bone on  x-ray  Uncertain pancreas lesion noted on CT urogram, avid area on PET c/f malignancy. Avid sclerotic lesion c/f mets, though less likely source of mets for pancreas malignancy.  -- Lab work, including tumor markers, ordered.  -- stat MRCP ordered - scheduled for tomorrow, 6/21.  -- may need urgent EUS for biopsy pending MR results. We will call Ms Lopez on Monday 6/22 to review MR & lab results and plan next steps accordingly.  -- Lung nodule not avid, will follow w/ PCP for surveillance. Calcified intrathoracic lymph nodes favored inflammatory.  -- All patient and family questions answered. I provided the patient w/ the contact information for the diagnostic clinic; advised her to call in the interim with any questions/issues.     ERIC [email or web address]  [facility]         Virtual or Telephone Consent    While technically available, the patient was unable or unwilling to consent to connect via audio/video telehealth technology; therefore, I performed this visit using a real-time audio only connection between Dayan Lopez & MEMO Laura.  Verbal consent was requested and obtained from Dayan Lopez on this date, 06/20/25 for a telehealth visit and the patient's location was confirmed at the time of the visit.         [1]   Past Medical History:  Diagnosis Date    Allergic 1979    Anxiety 2008    Benign paroxysmal positional vertigo 07/28/2009    COVID     Dizziness     Endometrial cancer (Multi) 10/24/2023    GERD (gastroesophageal reflux disease) 1990?    Heart disease 2007    Hypertension 2007    MI (myocardial infarction) (Multi) 03/18/2013    Formatting of this note might be different from the original. 2007    Old myocardial infarction     History of myocardial infarction    Personal history of malignant neoplasm of other parts of uterus     History of cancer of uterus    Personal history of other diseases of the digestive system     History of gastroesophageal  reflux (GERD)    Personal history of other specified conditions     History of vertigo    Uterine cancer (Multi) 10/24/2023   [2]   Past Surgical History:  Procedure Laterality Date    CARDIAC CATHETERIZATION  07/2007    COLONOSCOPY  10/11/2019    diverticulosis, otherwise normal, due in 2029    CORONARY ANGIOPLASTY WITH STENT PLACEMENT  2007    FRACTURE SURGERY  04/2000    HYSTERECTOMY  12/05/2014    Hysterectomy    OTHER SURGICAL HISTORY  12/05/2014    Transcath Placement Of Intrathoracic Carotid Artery Stent    OTHER SURGICAL HISTORY  12/05/2014    Treatment Of The Right Leg   [3]   Family History  Problem Relation Name Age of Onset    Heart disease Mother Hillary     Osteoporosis Mother Hillary     Stroke Mother Hillary     Liver cancer Father Sheldon     Diabetes Father Sheldon     Cancer Father Sheldon     Heart disease Sister Yesi     Heart disease Brother      Cancer Brother          hpv oral    Breast cancer Mother's Sister Susan     Cancer Mother's Sister Susan     Heart disease Brother Artruo     Cancer Mother's Brother Robert    [4]   Current Outpatient Medications on File Prior to Visit   Medication Sig Dispense Refill    atorvastatin (Lipitor) 10 mg tablet Take 1 tablet (10 mg) by mouth once daily.      calcium carbonate (Tums) 200 mg calcium chewable tablet Chew 1 tablet once daily.      cetirizine (ZyrTEC) 5 mg chewable tablet Chew 1 tablet (5 mg) once daily. (Patient taking differently: Chew 1 tablet (5 mg) if needed.)      cholecalciferol (Vitamin D-3) 50 MCG (2000 UT) tablet Take 1 tablet (50 mcg) by mouth once daily. 30 tablet 11    dapagliflozin propanediol (Farxiga) 10 mg Take 1 tablet (10 mg) by mouth once daily. 90 tablet 3    levothyroxine (Synthroid, Levoxyl) 25 mcg tablet Take 1 tablet (25 mcg) by mouth once daily. 90 tablet 1    lutein 20 mg capsule Take by mouth.      metoprolol succinate XL (Toprol-XL) 25 mg 24 hr tablet Take 1 tablet (25 mg) by mouth every 12 hours.      omeprazole  (PriLOSEC) 20 mg DR capsule       sacubitriL-valsartan (Entresto) 49-51 mg tablet 2 times a day.      sertraline (Zoloft) 25 mg tablet Take 1 tablet (25 mg) by mouth once daily.       No current facility-administered medications on file prior to visit.

## 2025-06-20 ENCOUNTER — LAB (OUTPATIENT)
Dept: LAB | Facility: HOSPITAL | Age: 70
End: 2025-06-20
Payer: MEDICARE

## 2025-06-20 ENCOUNTER — TELEMEDICINE (OUTPATIENT)
Dept: HEMATOLOGY/ONCOLOGY | Facility: CLINIC | Age: 70
End: 2025-06-20
Payer: MEDICARE

## 2025-06-20 DIAGNOSIS — K86.9 LESION OF PANCREAS (HHS-HCC): ICD-10-CM

## 2025-06-20 DIAGNOSIS — R94.8 ABNORMAL POSITRON EMISSION TOMOGRAPHY (PET) SCAN: ICD-10-CM

## 2025-06-20 DIAGNOSIS — M89.8X9 OTHER SPECIFIED DISORDERS OF BONE, UNSPECIFIED SITE: ICD-10-CM

## 2025-06-20 DIAGNOSIS — M89.8X9 LYTIC LESION OF BONE ON X-RAY: ICD-10-CM

## 2025-06-20 DIAGNOSIS — R91.1 SOLITARY PULMONARY NODULE: ICD-10-CM

## 2025-06-20 DIAGNOSIS — R79.1 ABNORMAL COAGULATION PROFILE: ICD-10-CM

## 2025-06-20 DIAGNOSIS — R97.8 OTHER ABNORMAL TUMOR MARKERS: ICD-10-CM

## 2025-06-20 DIAGNOSIS — K86.89 OTHER SPECIFIED DISEASES OF PANCREAS: Primary | ICD-10-CM

## 2025-06-20 DIAGNOSIS — K86.89 MASS OF PANCREAS (HHS-HCC): Primary | ICD-10-CM

## 2025-06-20 DIAGNOSIS — R91.1 LUNG NODULE: ICD-10-CM

## 2025-06-20 LAB
AFP SERPL-MCNC: 5 NG/ML (ref 0–9)
ALBUMIN SERPL BCP-MCNC: 4.2 G/DL (ref 3.4–5)
ALP SERPL-CCNC: 63 U/L (ref 33–136)
ALT SERPL W P-5'-P-CCNC: 10 U/L (ref 7–45)
ANION GAP SERPL CALC-SCNC: 15 MMOL/L (ref 10–20)
AST SERPL W P-5'-P-CCNC: 14 U/L (ref 9–39)
BASOPHILS # BLD AUTO: 0.03 X10*3/UL (ref 0–0.1)
BASOPHILS NFR BLD AUTO: 0.4 %
BILIRUB SERPL-MCNC: 0.6 MG/DL (ref 0–1.2)
BUN SERPL-MCNC: 15 MG/DL (ref 6–23)
CALCIUM SERPL-MCNC: 9.3 MG/DL (ref 8.6–10.3)
CANCER AG19-9 SERPL-ACNC: 4.34 U/ML
CEA SERPL-MCNC: 3.5 UG/L
CHLORIDE SERPL-SCNC: 100 MMOL/L (ref 98–107)
CO2 SERPL-SCNC: 31 MMOL/L (ref 21–32)
CREAT SERPL-MCNC: 0.74 MG/DL (ref 0.5–1.05)
EGFRCR SERPLBLD CKD-EPI 2021: 88 ML/MIN/1.73M*2
EOSINOPHIL # BLD AUTO: 0.4 X10*3/UL (ref 0–0.7)
EOSINOPHIL NFR BLD AUTO: 5.5 %
ERYTHROCYTE [DISTWIDTH] IN BLOOD BY AUTOMATED COUNT: 15 % (ref 11.5–14.5)
GLUCOSE SERPL-MCNC: 98 MG/DL (ref 74–99)
HCT VFR BLD AUTO: 44.9 % (ref 36–46)
HGB BLD-MCNC: 13.3 G/DL (ref 12–16)
HOLD SPECIMEN: NORMAL
HOLD SPECIMEN: NORMAL
IMM GRANULOCYTES # BLD AUTO: 0.03 X10*3/UL (ref 0–0.7)
IMM GRANULOCYTES NFR BLD AUTO: 0.4 % (ref 0–0.9)
INR PPP: 1 (ref 0.9–1.1)
LYMPHOCYTES # BLD AUTO: 1.76 X10*3/UL (ref 1.2–4.8)
LYMPHOCYTES NFR BLD AUTO: 24.1 %
MCH RBC QN AUTO: 24.5 PG (ref 26–34)
MCHC RBC AUTO-ENTMCNC: 29.6 G/DL (ref 32–36)
MCV RBC AUTO: 83 FL (ref 80–100)
MONOCYTES # BLD AUTO: 0.52 X10*3/UL (ref 0.1–1)
MONOCYTES NFR BLD AUTO: 7.1 %
NEUTROPHILS # BLD AUTO: 4.55 X10*3/UL (ref 1.2–7.7)
NEUTROPHILS NFR BLD AUTO: 62.5 %
NRBC BLD-RTO: 0 /100 WBCS (ref 0–0)
PLATELET # BLD AUTO: 364 X10*3/UL (ref 150–450)
POTASSIUM SERPL-SCNC: 4.5 MMOL/L (ref 3.5–5.3)
PROT SERPL-MCNC: 6.7 G/DL (ref 6.4–8.2)
PROTHROMBIN TIME: 10.8 SECONDS (ref 9.8–12.4)
RBC # BLD AUTO: 5.42 X10*6/UL (ref 4–5.2)
SODIUM SERPL-SCNC: 141 MMOL/L (ref 136–145)
WBC # BLD AUTO: 7.3 X10*3/UL (ref 4.4–11.3)

## 2025-06-20 PROCEDURE — 82105 ALPHA-FETOPROTEIN SERUM: CPT

## 2025-06-20 PROCEDURE — 1160F RVW MEDS BY RX/DR IN RCRD: CPT | Performed by: NURSE PRACTITIONER

## 2025-06-20 PROCEDURE — 80053 COMPREHEN METABOLIC PANEL: CPT

## 2025-06-20 PROCEDURE — 86301 IMMUNOASSAY TUMOR CA 19-9: CPT

## 2025-06-20 PROCEDURE — 99215 OFFICE O/P EST HI 40 MIN: CPT | Mod: 95 | Performed by: NURSE PRACTITIONER

## 2025-06-20 PROCEDURE — 1159F MED LIST DOCD IN RCRD: CPT | Performed by: NURSE PRACTITIONER

## 2025-06-20 PROCEDURE — 99205 OFFICE O/P NEW HI 60 MIN: CPT | Performed by: NURSE PRACTITIONER

## 2025-06-20 PROCEDURE — 85610 PROTHROMBIN TIME: CPT

## 2025-06-20 PROCEDURE — 1036F TOBACCO NON-USER: CPT | Performed by: NURSE PRACTITIONER

## 2025-06-20 PROCEDURE — 82378 CARCINOEMBRYONIC ANTIGEN: CPT

## 2025-06-20 PROCEDURE — 85025 COMPLETE CBC W/AUTO DIFF WBC: CPT

## 2025-06-20 ASSESSMENT — ENCOUNTER SYMPTOMS
CONSTIPATION: 0
DIARRHEA: 0
NAUSEA: 0
APPETITE CHANGE: 0
ENDOCRINE COMMENTS: DENIES NIGHT SWEATS
SHORTNESS OF BREATH: 0
EXTREMITY WEAKNESS: 0
FATIGUE: 1
UNEXPECTED WEIGHT CHANGE: 0
FEVER: 0
ABDOMINAL PAIN: 0
SPEECH DIFFICULTY: 0
VOMITING: 0
CHILLS: 0
COUGH: 0

## 2025-06-21 ENCOUNTER — HOSPITAL ENCOUNTER (OUTPATIENT)
Dept: RADIOLOGY | Facility: HOSPITAL | Age: 70
Discharge: HOME | End: 2025-06-21
Payer: MEDICARE

## 2025-06-21 DIAGNOSIS — K86.89 MASS OF PANCREAS (HHS-HCC): ICD-10-CM

## 2025-06-21 PROCEDURE — A9575 INJ GADOTERATE MEGLUMI 0.1ML: HCPCS | Mod: JW | Performed by: NURSE PRACTITIONER

## 2025-06-21 PROCEDURE — 74183 MRI ABD W/O CNTR FLWD CNTR: CPT | Performed by: STUDENT IN AN ORGANIZED HEALTH CARE EDUCATION/TRAINING PROGRAM

## 2025-06-21 PROCEDURE — 2550000001 HC RX 255 CONTRASTS: Mod: JW | Performed by: NURSE PRACTITIONER

## 2025-06-21 PROCEDURE — 76376 3D RENDER W/INTRP POSTPROCES: CPT | Performed by: STUDENT IN AN ORGANIZED HEALTH CARE EDUCATION/TRAINING PROGRAM

## 2025-06-21 PROCEDURE — 74183 MRI ABD W/O CNTR FLWD CNTR: CPT

## 2025-06-21 RX ORDER — GADOTERATE MEGLUMINE 376.9 MG/ML
16 INJECTION INTRAVENOUS
Status: COMPLETED | OUTPATIENT
Start: 2025-06-21 | End: 2025-06-21

## 2025-06-21 RX ADMIN — GADOTERATE MEGLUMINE 16 ML: 376.9 INJECTION INTRAVENOUS at 11:19

## 2025-06-23 ENCOUNTER — DOCUMENTATION (OUTPATIENT)
Dept: SURGICAL ONCOLOGY | Facility: CLINIC | Age: 70
End: 2025-06-23
Payer: MEDICARE

## 2025-06-23 DIAGNOSIS — K86.89 PANCREATIC MASS (HHS-HCC): Primary | ICD-10-CM

## 2025-06-23 LAB
HOLD SPECIMEN: NORMAL
HOLD SPECIMEN: NORMAL

## 2025-06-23 NOTE — PROGRESS NOTES
Chart update:     Called Mrs. Lopez to discuss MRI results.    There is a 2.5 cm hypoenhancing mass in the pancreatic tail concerning for PDAC. There are liver cysts as well as an indeterminate 7 mm lesion in the caudate lobe (not PET avid).    Of note, PET also showed intense uptake at the ICV and an avid lesion in the left hemisacrum.     is normal at 4, CEA normal.     PLAN:   1) EUS-guided biopsy at [facility] on Fri (instructed on NPO at midnight, not on AC)   2) Will review imaging at our multidisciplinary pancreas conference this week   3) Will need Med Onc appointment once pathology results are in     Laura López PA-C  Surgical Oncology

## 2025-06-25 ENCOUNTER — DOCUMENTATION (OUTPATIENT)
Dept: SURGICAL ONCOLOGY | Facility: CLINIC | Age: 70
End: 2025-06-25
Payer: MEDICARE

## 2025-06-25 DIAGNOSIS — K86.89 PANCREATIC MASS (HHS-HCC): Primary | ICD-10-CM

## 2025-06-25 NOTE — H&P (VIEW-ONLY)
Her imaging was reviewed at our multidisciplinary pancreas conference this morning.    There is a 2.4 cm mass-like area in the pancreatic tail with mild diffusion restriction and enhancement. There are small calcifications in the tail as well as ductal dilatation. Our radiologist does not think this has the typical appearance of an adenocarcinoma. This could possibly represent autoimmune pancreatitis.     I called and discussed with her. We will plan to keep the EUS as scheduled as it is imperative to exclude a pancreas cancer.     Otherwise, there is a 7 mm indeterminate liver lesion described on the MRI but not avid on PET. Our radiologist believes this is a small lymph node and not truly in the hepatic parenchyma.     There is also uptake at the ICV on the PET. Our radiologist believes this is likely physiologic uptake due to lymphocytes in the region. There is no correlate on the MRI. She had a negative colonoscopy in .     There is also a PET avid bone lesion. Our radiologist notes a thin sclerotic margin and believes this to likely represent a benign lesion.    We discussed all of the above. To add, she has no family history of PDAC. Her father  from HCC secondary to cirrhosis. She has no personal history of pancreatitis, no autoimmune diseases. She does not drink or smoke tobacco.    I ordered an IgG4 and will follow-up after the EUS.    Laura López PA-C  Surgical Oncology

## 2025-06-27 ENCOUNTER — HOSPITAL ENCOUNTER (OUTPATIENT)
Dept: GASTROENTEROLOGY | Facility: HOSPITAL | Age: 70
Discharge: HOME | End: 2025-06-27
Payer: MEDICARE

## 2025-06-27 ENCOUNTER — ANESTHESIA (OUTPATIENT)
Dept: GASTROENTEROLOGY | Facility: HOSPITAL | Age: 70
End: 2025-06-27
Payer: MEDICARE

## 2025-06-27 ENCOUNTER — ANESTHESIA EVENT (OUTPATIENT)
Dept: GASTROENTEROLOGY | Facility: HOSPITAL | Age: 70
End: 2025-06-27
Payer: MEDICARE

## 2025-06-27 VITALS
RESPIRATION RATE: 18 BRPM | BODY MASS INDEX: 32.43 KG/M2 | SYSTOLIC BLOOD PRESSURE: 128 MMHG | HEIGHT: 63 IN | HEART RATE: 74 BPM | OXYGEN SATURATION: 94 % | TEMPERATURE: 98.4 F | DIASTOLIC BLOOD PRESSURE: 68 MMHG | WEIGHT: 183 LBS

## 2025-06-27 DIAGNOSIS — K86.89 PANCREATIC MASS (HHS-HCC): Primary | ICD-10-CM

## 2025-06-27 LAB — GLUCOSE BLD MANUAL STRIP-MCNC: 138 MG/DL (ref 74–99)

## 2025-06-27 PROCEDURE — 82947 ASSAY GLUCOSE BLOOD QUANT: CPT

## 2025-06-27 PROCEDURE — 43238 EGD US FINE NEEDLE BX/ASPIR: CPT | Performed by: INTERNAL MEDICINE

## 2025-06-27 PROCEDURE — 2500000004 HC RX 250 GENERAL PHARMACY W/ HCPCS (ALT 636 FOR OP/ED): Performed by: NURSE ANESTHETIST, CERTIFIED REGISTERED

## 2025-06-27 RX ORDER — PROPOFOL 10 MG/ML
INJECTION, EMULSION INTRAVENOUS AS NEEDED
Status: DISCONTINUED | OUTPATIENT
Start: 2025-06-27 | End: 2025-06-27

## 2025-06-27 RX ORDER — FENTANYL CITRATE 50 UG/ML
INJECTION, SOLUTION INTRAMUSCULAR; INTRAVENOUS AS NEEDED
Status: DISCONTINUED | OUTPATIENT
Start: 2025-06-27 | End: 2025-06-27

## 2025-06-27 RX ORDER — ONDANSETRON HYDROCHLORIDE 2 MG/ML
4 INJECTION, SOLUTION INTRAVENOUS ONCE AS NEEDED
OUTPATIENT
Start: 2025-06-27

## 2025-06-27 RX ORDER — LIDOCAINE HYDROCHLORIDE 20 MG/ML
INJECTION, SOLUTION EPIDURAL; INFILTRATION; INTRACAUDAL; PERINEURAL AS NEEDED
Status: DISCONTINUED | OUTPATIENT
Start: 2025-06-27 | End: 2025-06-27

## 2025-06-27 RX ORDER — OXYCODONE HYDROCHLORIDE 5 MG/1
5 TABLET ORAL EVERY 4 HOURS PRN
Refills: 0 | OUTPATIENT
Start: 2025-06-27

## 2025-06-27 RX ORDER — LABETALOL HYDROCHLORIDE 5 MG/ML
5 INJECTION, SOLUTION INTRAVENOUS ONCE AS NEEDED
OUTPATIENT
Start: 2025-06-27

## 2025-06-27 RX ORDER — METOCLOPRAMIDE HYDROCHLORIDE 5 MG/ML
10 INJECTION INTRAMUSCULAR; INTRAVENOUS ONCE AS NEEDED
OUTPATIENT
Start: 2025-06-27

## 2025-06-27 RX ORDER — LIDOCAINE HYDROCHLORIDE 10 MG/ML
0.1 INJECTION, SOLUTION INFILTRATION; PERINEURAL ONCE
OUTPATIENT
Start: 2025-06-27 | End: 2025-06-27

## 2025-06-27 RX ORDER — SODIUM CHLORIDE, SODIUM LACTATE, POTASSIUM CHLORIDE, CALCIUM CHLORIDE 600; 310; 30; 20 MG/100ML; MG/100ML; MG/100ML; MG/100ML
75 INJECTION, SOLUTION INTRAVENOUS CONTINUOUS
OUTPATIENT
Start: 2025-06-27 | End: 2025-06-28

## 2025-06-27 RX ORDER — SODIUM CHLORIDE, SODIUM LACTATE, POTASSIUM CHLORIDE, CALCIUM CHLORIDE 600; 310; 30; 20 MG/100ML; MG/100ML; MG/100ML; MG/100ML
INJECTION, SOLUTION INTRAVENOUS CONTINUOUS PRN
Status: DISCONTINUED | OUTPATIENT
Start: 2025-06-27 | End: 2025-06-27

## 2025-06-27 RX ADMIN — SODIUM CHLORIDE, POTASSIUM CHLORIDE, SODIUM LACTATE AND CALCIUM CHLORIDE: 600; 310; 30; 20 INJECTION, SOLUTION INTRAVENOUS at 13:10

## 2025-06-27 RX ADMIN — PROPOFOL 20 MG: 10 INJECTION, EMULSION INTRAVENOUS at 13:29

## 2025-06-27 RX ADMIN — PROPOFOL 60 MG: 10 INJECTION, EMULSION INTRAVENOUS at 13:16

## 2025-06-27 RX ADMIN — PROPOFOL 120 MCG/KG/MIN: 10 INJECTION, EMULSION INTRAVENOUS at 13:17

## 2025-06-27 RX ADMIN — LIDOCAINE HYDROCHLORIDE 50 MG: 20 INJECTION, SOLUTION EPIDURAL; INFILTRATION; INTRACAUDAL; PERINEURAL at 13:16

## 2025-06-27 RX ADMIN — FENTANYL CITRATE 50 MCG: 50 INJECTION, SOLUTION INTRAMUSCULAR; INTRAVENOUS at 13:16

## 2025-06-27 SDOH — HEALTH STABILITY: MENTAL HEALTH: CURRENT SMOKER: 0

## 2025-06-27 ASSESSMENT — PAIN - FUNCTIONAL ASSESSMENT
PAIN_FUNCTIONAL_ASSESSMENT: 0-10

## 2025-06-27 ASSESSMENT — PAIN SCALES - GENERAL
PAINLEVEL_OUTOF10: 0 - NO PAIN
PAINLEVEL_OUTOF10: 0 - NO PAIN
PAIN_LEVEL: 0
PAINLEVEL_OUTOF10: 0 - NO PAIN

## 2025-06-27 NOTE — DISCHARGE INSTRUCTIONS

## 2025-06-27 NOTE — ANESTHESIA POSTPROCEDURE EVALUATION
Patient: Dayan Lopez    Procedure Summary       Date: 06/27/25 Room / Location: [facility]    Anesthesia Start: 1310 Anesthesia Stop: 1340    Procedure: ENDOSCOPIC ULTRASOUND (UPPER) Diagnosis: Pancreatic mass ([facility])    Scheduled Providers: Joann Hilario MD Responsible Provider: Juan Gomez MD    Anesthesia Type: MAC ASA Status: 3            Anesthesia Type: MAC    Vitals Value Taken Time   /63 06/27/25 13:41   Temp 37.3 06/27/25 13:41   Pulse 94 06/27/25 13:41   Resp 16 06/27/25 13:41   SpO2 92 06/27/25 13:41       Anesthesia Post Evaluation    Patient location during evaluation: PACU  Patient participation: complete - patient participated  Level of consciousness: awake, sleepy but conscious and responsive to verbal stimuli  Pain score: 0  Pain management: adequate  Airway patency: patent  Cardiovascular status: acceptable, blood pressure returned to baseline and hemodynamically stable  Respiratory status: acceptable, room air, spontaneous ventilation and unassisted (Coughing frequently)  Hydration status: acceptable  Postoperative Nausea and Vomiting: none  Comments: Handoff to Steffany PACU RN in phase 2        There were no known notable events for this encounter.

## 2025-06-27 NOTE — ANESTHESIA PREPROCEDURE EVALUATION
Patient: Dayan Lopez    Procedure Information       Date/Time: 06/27/25 1400    Scheduled providers: Joann Hilario MD    Procedure: ENDOSCOPIC ULTRASOUND (UPPER)    Location: [facility]            Relevant Problems   Cardiac   (+) Arteriosclerosis of coronary artery   (+) HTN (hypertension)   (+) Hyperlipidemia   (+) Non-ST elevation (NSTEMI) myocardial infarction (Multi)      Neuro   (+) Anxiety      GI   (+) Acid reflux      /Renal   (+) Hydronephrosis determined by ultrasound      Endocrine   (+) Acquired hypothyroidism       Clinical information reviewed:   Tobacco  Allergies  Meds   Med Hx  Surg Hx  OB Status  Fam Hx  Soc   Hx        NPO Detail:  NPO/Void Status  Carbohydrate Drink Given Prior to Surgery? : N  Date of Last Liquid: 06/26/25  Time of Last Liquid: 2100  Date of Last Solid: 06/26/25  Time of Last Solid: 2100  Last Intake Type: Food  Time of Last Void: 1228         Physical Exam    Airway  Mallampati: I  TM distance: >3 FB  Mouth opening: 3 or more finger widths     Cardiovascular - normal exam  Rhythm: regular  Rate: normal     Dental - normal exam     Pulmonary - normal examBreath sounds clear to auscultation     Abdominal - normal exam           Anesthesia Plan    History of general anesthesia?: yes  History of complications of general anesthesia?: no    ASA 3     MAC and general     The patient is not a current smoker.    intravenous induction   Anesthetic plan and risks discussed with patient.    Plan discussed with CRNA.

## 2025-07-07 ENCOUNTER — TELEPHONE (OUTPATIENT)
Dept: HEMATOLOGY/ONCOLOGY | Facility: HOSPITAL | Age: 70
End: 2025-07-07
Payer: MEDICARE

## 2025-07-07 NOTE — PROGRESS NOTES
"Jaime Lopez  is a 69 y.o. female who presents for evaluation of left lower lobe lung nodule.    Ms. Dayan Lopez is a 69-year-old female with a complex medical history including hypertension, hyperlipidemia, coronary artery disease status post PCI, heart failure, hypothyroidism, GERD, endometrial cancer status post hysterectomy, and anxiety, who presents for evaluation of an incidental left lower lobe pulmonary nodule.    The lesion was first identified on CT urography performed on 5/6/2025, demonstrating a 1 x 2.5 cm elongated tubular nodular opacity in the posteromedial basilar segment of the left lower lobe. Differential at that time included mucous plugging, endobronchial lesion, or pulmonary neoplasm. PET-CT performed 6/2/2025 demonstrated that the nodule was non-FDG avid. No additional suspicious pulmonary nodules were identified.  The PET/CT also identified FDG activity in the tail of the pancreas, and the patient underwent an EUS based biopsy on 6/27/2025 which were \"suspicious for malignancy\". She is scheduled to meet with a surgeon to discuss partial pancreatectomy.     The patient denies cough, hemoptysis, dyspnea, chest pain, fevers, or unintentional weight loss. She reports stable respiratory symptoms and attributes mild fatigue to cardiac comorbidity. She has never smoked and has no known occupational exposures. She reports a recent intentional weight reduction of 8 lbs related to initiation of dapagliflozin. She has no personal history of primary lung malignancy.    Currently the patient is in their usual state of health. She denies the following symptoms: chest pain, shortness of breath at rest, shortness of breath with activity, cough, hemoptysis, fevers, chills, and weight loss. She has had a few episode of bronchitis and was coughing quite a bit a month ago, but this has resovled    There have been no significant changes to their documented medical, surgical and family " history.     She  reports that she has never smoked. She has never used smokeless tobacco. She reports current alcohol use of about 1.0 standard drink of alcohol per week. She reports that she does not use drugs.    Objective   Physical Exam  The patient is well-appearing and in no acute distress. The trachea is midline and there is no crepitus. The lungs were clear to auscultation grossly. There was good effort and excursion. The heart had a regular rate and rhythm. The abdomen was soft, nontender and nondistended. The extremities had no edema or gross deformities. Mood and affect are appropriate.  Diagnostic Studies  I reviewed the test results described in the HPI    Assessment/Plan   I believe that the patient has a lung nodule of unclear etiology.     Based on the patient's clinical presentation and my review of their radiographic imaging, I believe the lung nodule is unlikely to represent a malignant process.  We discussed various management strategies including surgery, biopsy, and observation.  Based on the ongoing concerns about the patient's pancreatic malignancy, I believe this lung nodule can be safely ignored at the moment and follow-up with her other providers.  I have asked her to follow-up in this office on an as-needed basis.    I recommend If she had a diagnosis of pancreatic cancer follow up in this office as needed and Remainder of care by other providers. If not pancreatic cancer, CT in 9 months.     I discussed this in detail with the patient, including a discussion of alternatives. They were comfortable with this approach.     Hunter Srivastava MD  [phone]

## 2025-07-07 NOTE — TELEPHONE ENCOUNTER
Pt left a message with Diagnostic Clinic inquiring about BX results. Advised that results are still pending but team is checking often. They have reached out to Dr. Hilario and Pathology. Pt verbalized understanding.

## 2025-07-08 LAB
LABORATORY COMMENT REPORT: NORMAL
PATH REPORT.FINAL DX SPEC: NORMAL
PATH REPORT.FINAL DX SPEC: NORMAL
PATH REPORT.GROSS SPEC: NORMAL
PATH REPORT.GROSS SPEC: NORMAL
PATH REPORT.RELEVANT HX SPEC: NORMAL
PATH REPORT.TOTAL CANCER: NORMAL
PATH REPORT.TOTAL CANCER: NORMAL
RESIDENT REVIEW: NORMAL

## 2025-07-09 ENCOUNTER — LAB (OUTPATIENT)
Dept: LAB | Facility: HOSPITAL | Age: 70
End: 2025-07-09
Payer: MEDICARE

## 2025-07-09 ENCOUNTER — PREP FOR PROCEDURE (OUTPATIENT)
Dept: SURGICAL ONCOLOGY | Facility: CLINIC | Age: 70
End: 2025-07-09

## 2025-07-09 ENCOUNTER — DOCUMENTATION (OUTPATIENT)
Dept: SURGICAL ONCOLOGY | Facility: CLINIC | Age: 70
End: 2025-07-09

## 2025-07-09 ENCOUNTER — APPOINTMENT (OUTPATIENT)
Dept: SURGERY | Facility: CLINIC | Age: 70
End: 2025-07-09
Payer: MEDICARE

## 2025-07-09 VITALS
TEMPERATURE: 98.4 F | WEIGHT: 184.8 LBS | BODY MASS INDEX: 32.74 KG/M2 | OXYGEN SATURATION: 96 % | HEART RATE: 102 BPM | HEIGHT: 63 IN

## 2025-07-09 DIAGNOSIS — D38.1 NEOPLASM OF UNCERTAIN BEHAVIOR OF LUNG: ICD-10-CM

## 2025-07-09 DIAGNOSIS — R91.1 LUNG NODULE: ICD-10-CM

## 2025-07-09 DIAGNOSIS — K86.89 PANCREATIC MASS (HHS-HCC): Primary | ICD-10-CM

## 2025-07-09 DIAGNOSIS — K86.89 OTHER SPECIFIED DISEASES OF PANCREAS: Primary | ICD-10-CM

## 2025-07-09 PROCEDURE — 99215 OFFICE O/P EST HI 40 MIN: CPT | Performed by: THORACIC SURGERY (CARDIOTHORACIC VASCULAR SURGERY)

## 2025-07-09 PROCEDURE — 3008F BODY MASS INDEX DOCD: CPT | Performed by: THORACIC SURGERY (CARDIOTHORACIC VASCULAR SURGERY)

## 2025-07-09 PROCEDURE — 36415 COLL VENOUS BLD VENIPUNCTURE: CPT

## 2025-07-09 PROCEDURE — 82787 IGG 1 2 3 OR 4 EACH: CPT

## 2025-07-09 PROCEDURE — 1036F TOBACCO NON-USER: CPT | Performed by: THORACIC SURGERY (CARDIOTHORACIC VASCULAR SURGERY)

## 2025-07-09 PROCEDURE — 1159F MED LIST DOCD IN RCRD: CPT | Performed by: THORACIC SURGERY (CARDIOTHORACIC VASCULAR SURGERY)

## 2025-07-09 PROCEDURE — 99205 OFFICE O/P NEW HI 60 MIN: CPT | Performed by: THORACIC SURGERY (CARDIOTHORACIC VASCULAR SURGERY)

## 2025-07-09 PROCEDURE — 1126F AMNT PAIN NOTED NONE PRSNT: CPT | Performed by: THORACIC SURGERY (CARDIOTHORACIC VASCULAR SURGERY)

## 2025-07-09 RX ORDER — HEPARIN SODIUM 5000 [USP'U]/ML
5000 INJECTION, SOLUTION INTRAVENOUS; SUBCUTANEOUS ONCE
OUTPATIENT
Start: 2025-07-09 | End: 2025-07-09

## 2025-07-09 ASSESSMENT — ENCOUNTER SYMPTOMS
LOSS OF SENSATION IN FEET: 0
DEPRESSION: 0
OCCASIONAL FEELINGS OF UNSTEADINESS: 0

## 2025-07-09 ASSESSMENT — PAIN SCALES - GENERAL: PAINLEVEL_OUTOF10: 0-NO PAIN

## 2025-07-09 NOTE — PROGRESS NOTES
Chart update:    Her imaging was again reviewed at our multidisciplinary pancreas conference.     There is positive diffusion restriction involving most of the pancreatic tail. There is also delayed enhancement on post-contrast images. Our radiologist continues to favor an inflammatory process, such as AIP.    That said, recent EUS with Dr. Hilario describes a hypoechoic mass. FNB showed benign parenchyma and fibrosis but FNA was suspicious for malignancy.     Even if the EUS was repeated and the results were negative, there would still be a concern for malignancy given the current FNA.    After a discussion, a visit with a surgeon was recommended to discuss a distal pancreatectomy.     I called and updated Dayan and her . I have arranged an appointment with Dr. Vu Fong next week. She will have an IgG4 level drawn (though I discussed the limitations). Recall, her 19-9 is normal.     Laura López PA-C  Surgical Oncology

## 2025-07-10 LAB — IGG4 SER-MCNC: 73 MG/DL (ref 3–200)

## 2025-07-10 NOTE — PROGRESS NOTES
Chief Complaint:  Pancreatic Mass      Ref = Alma López   Adv GI = Dinary    HPI:  70 yo woman from [place] who had a CTU to eval hydronephosis on 5/7:  IMPRESSION:  Multiple central renal parapelvic cysts bilaterally as well as a  right renal lower pole 1.1 cm cortical cyst.   No hydroureteronephrosis bilaterally. No renal collecting system or  ureteral filling defect identified. No renal or ureteral calculi.  No appreciable urinary bladder abnormality.  Large hiatal hernia. Nonspecific gastric wall thickening may be  partially exaggerated by underdistention.  Colonic diverticulosis without acute diverticulitis.  Tiny calcified gallstone in the dependent portion toward the fundus.   Left lower lobe indeterminate elongated tubular nodular lesion  measuring 1 x 2.5 cm in transverse and CC dimensions. Considerations  include mucous plug, endobronchial lesion and pulmonary nodule.    Based on, her PCP ordered PET:    IMPRESSION:  1. Focal FDG uptake within pancreatic tail, concerning for neoplastic  process. Further evaluation with MRI is recommended.  2. FDG avid sclerotic lesion with the left diana sacrum, concerning  for metastasis.  3. Intense FDG uptake within ileocecal junction non-specific.  Continued attention on follow-up imaging is recommended.  4. Non FDG avid left lower lobe pulmonary nodule, non-specific.  Follow-up with diagnostic CT chest to document interval  resolution/stability.  5. FDG avid right hilar lymph node, likely reactive.  6. Calcified non FDG avid left hilar, subcarinal and paraesophageal  lymph nodes, likely inflammatory.    Based on this [facility] Notified and MRI done:  IMPRESSION:  1.  A 2.4 x 1.5 x 1.6 cm hypoenhancing pancreatic tail mass with  diffusion restriction, as detailed above, concerning for primary  (e.g. Adenocarcinoma) or metastatic pancreatic neoplasm.  2. Indeterminate 7 mm diffusion restricting lesion in the caudate  lobe with T2 slightly hyperintense  signal and relative  hypoenhancement in the arterial phase. Finding is incompletely  characterized and may represent a small hemangioma, however  metastasis can not be excluded in the current setting. Continued  follow-up is recommended.  3. Large hiatal hernia.    Pt reviewed at weekly imaging meeting upon presentation by Laura López:  Her imaging was reviewed at our multidisciplinary pancreas conference this morning.  There is a 2.4 cm mass-like area in the pancreatic tail with mild diffusion restriction and enhancement. There are small calcifications in the tail as well as ductal dilatation. Our radiologist does not think this has the typical appearance of an adenocarcinoma. This could possibly represent autoimmune pancreatitis.   I called and discussed with her. We will plan to keep the EUS as scheduled as it is imperative to exclude a pancreas cancer.   Otherwise, there is a 7 mm indeterminate liver lesion described on the MRI but not avid on PET. Our radiologist believes this is a small lymph node and not truly in the hepatic parenchyma.   There is also uptake at the ICV on the PET. Our radiologist believes this is likely physiologic uptake due to lymphocytes in the region. There is no correlate on the MRI. She had a negative colonoscopy in 2023.   There is also a PET avid bone lesion. Our radiologist notes a thin sclerotic margin and believes this to likely represent a benign lesion.    EUS:  Impression  Single homogeneous, hypoechoic, oval and solid mass measuring 28 mm x 20 mm with well-defined margins was visualized in the tail of the pancreas. Apparent encasement of the splenic artery; 2 fine needle aspiration passes were taken. An adequate sample of aspirate was obtained. The sample was sent for cytology analysis; 2 fine needle biopsy passes were taken. An adequate sample was obtained. A sample was sent for histology analysis  The parenchyma was visualized in the head of the pancreas and body of the  pancreas. The parenchyma was heterogeneous and hyperechoic. The pancreatic duct appeared normal.  The major papilla and common bile duct appeared normal.  The left kidney, spleen and left adrenal appeared normal.  The parenchyma of the liver appeared normal. The parenchyma was visualized in the left lobe of the liver. The parenchyma was homogeneous. The hepatic ducts appeared normal.  FINAL DIAGNOSIS   A. Pancreas, Tail, Mass, Biopsy:      -- Benign pancreatic parenchyma and blood. Multiple deeper levels have been examined. See note.  Note: The clinical impression of a pancreatic tail mass is noted. This biopsy may not fully represent the entire mass. Cytology ([id number]) and clinical correlation is recommended.     Final Cytological Interpretation   A. PANCREAS FINE NEEDLE ASPIRATION DISTAL (TAIL)- TAIL OF PANCREAS MASS FNA, CYTOLOGY AND CELL BLOCK:                    Cytologic findings are suspicious for malignancy.  See note.  Note:  The specimen consists of fibrous tissue with atypical glands and single cells that are limited by extensive degenerative changes.  The overall findings are suspicious for malignancy, but clinical correlation and follow-up is recommended.  Correlate also with separate surgical specimen ([id number]).  This case was also reviewed with Dr. Rosalina Barbour and she concurs with the above diagnosis.               Reviewed again by Laura López:    Chart update:  Her imaging was again reviewed at our multidisciplinary pancreas conference.   There is positive diffusion restriction involving most of the pancreatic tail. There is also delayed enhancement on post-contrast images. Our radiologist continues to favor an inflammatory process, such as AIP.  That said, recent EUS with Dr. Hilario describes a hypoechoic mass. FNB showed benign parenchyma and fibrosis but FNA was suspicious for malignancy.   Even if the EUS was repeated and the results were negative, there would still be a concern  for malignancy given the current FNA.  After a discussion, a visit with a surgeon was recommended to discuss a distal pancreatectomy.   I called and updated Dayan and her . I have arranged an appointment with Dr. Vu Fong next week. She will have an IgG4 level drawn (though I discussed the limitations). Recall, her 19-9 is normal.      19-9 = 4 (so likely not a non-secretor) and CEA = 3.5.  IgG4 normal at 73.    Saw Mihai Srivastava in Thoracic Surgery:  Assessment/Plan  I believe that the patient has a lung nodule of unclear etiology.   Based on the patient's clinical presentation and my review of their radiographic imaging, I believe the lung nodule is unlikely to represent a malignant process.  We discussed various management strategies including surgery, biopsy, and observation.  Based on the ongoing concerns about the patient's pancreatic malignancy, I believe this lung nodule can be safely ignored at the moment and follow-up with her other providers.  I have asked her to follow-up in this office on an as-needed basis.  I recommend If she had a diagnosis of pancreatic cancer follow up in this office as needed and Remainder of care by other providers. If not pancreatic cancer, CT in 9 months.   I discussed this in detail with the patient, including a discussion of alternatives. They were comfortable with this approach.     Indeed this patient had this abnormality found entirely incidentally.  No abdominal pain or back pain.  No weight loss.  She has no signs or symptoms of biliary colic.  She has no suggestion of dysphagia or ongoing heartburn symptoms    Is seeing her cardiologist/having a stress test tomorrow.  Is being seen in preadmission testing later this week.    PMH:  HLD, CAD, CHF, HTN, hypothyroid, endometrial CA, CKD    PSH:  R leg fracture with riri/plate, MIS ANTOINETTE?BSO    Soc:  Retired from insurance industry.  .    FH:  brother HPV H and N CA, Dad cirrhosis/? HCC, matl aunt and matl  cousin breast.    PE: Appears her stated age.  No distress.  Not jaundiced.  No palpable lymphadenopathy.  Breath sounds are clear and heart regular.  Abdomen is protuberant but soft and nontender.    Impression / Plan:    This patient has a constellation of imaging, endoscopic, and pathologic findings that raise a significant concern for a pancreatic tail cancer.  As noted above the fine-needle biopsy was unremarkable but the fine-needle aspiration cytology was suspicious for malignancy.  While her imaging is not classic for pancreatic adenocarcinoma there is PET positivity and diffusion restriction and I cannot ignore that cytology.  I think that this patient needs a distal pancreatectomy and splenectomy.  Again all of the other PET findings are addressed above and none of them are worrisome.    Today I have talked at length with this patient and her  using pictorial aids about what is involved in a distal pancreatectomy and splenectomy.  We talked about this being 2-1/2 maybe 3-1/2-hour operation.  We talked about a hospital stay of anywhere from 3 to 6 days on average.  We talked about the recovery of anywhere from 6 to 10 weeks before a person is really fully recovered.  We talked about generic postsurgical complications as well as pancreas specific complications focusing a lot on fistulas, infections, drains, bleeding, wound issues, etc.  We talked about how those are addressed.  We talked about the potential for diabetes as well as exocrine insufficiency.  We talked about the need for postsplenectomy vaccinations.    We talked about the potential for cholecystectomy given that she has gallstones.  She does not have any overt symptoms so I think it unlikely we would proceed with that but it is a possibility.  We talked about the potential for hiatal hernia repair however she does not describe to me any symptoms related to it.  Again I am think it unlikely that we add that to her procedure.    I provided  this patient and her  time to ask questions and I answered all of them to their satisfaction.    She is seeing her cardiologist tomorrow and being seen in preadmission testing later this week.  The operation is set for 28 July.  She signed electronic informed consent.    This note has been dictated with voice recognition software and has not been reviewed for grammar or content errors.

## 2025-07-16 ENCOUNTER — OFFICE VISIT (OUTPATIENT)
Dept: SURGICAL ONCOLOGY | Facility: CLINIC | Age: 70
End: 2025-07-16
Payer: MEDICARE

## 2025-07-16 VITALS
TEMPERATURE: 97.3 F | HEART RATE: 84 BPM | BODY MASS INDEX: 34.08 KG/M2 | HEIGHT: 62 IN | WEIGHT: 185.19 LBS | SYSTOLIC BLOOD PRESSURE: 125 MMHG | RESPIRATION RATE: 12 BRPM | DIASTOLIC BLOOD PRESSURE: 75 MMHG | OXYGEN SATURATION: 93 %

## 2025-07-16 DIAGNOSIS — K86.89 PANCREATIC MASS (HHS-HCC): Primary | ICD-10-CM

## 2025-07-16 PROCEDURE — 1159F MED LIST DOCD IN RCRD: CPT | Performed by: SURGERY

## 2025-07-16 PROCEDURE — 99205 OFFICE O/P NEW HI 60 MIN: CPT | Performed by: SURGERY

## 2025-07-16 PROCEDURE — 99215 OFFICE O/P EST HI 40 MIN: CPT | Performed by: SURGERY

## 2025-07-16 PROCEDURE — 3074F SYST BP LT 130 MM HG: CPT | Performed by: SURGERY

## 2025-07-16 PROCEDURE — 3078F DIAST BP <80 MM HG: CPT | Performed by: SURGERY

## 2025-07-16 PROCEDURE — 3008F BODY MASS INDEX DOCD: CPT | Performed by: SURGERY

## 2025-07-16 PROCEDURE — 1126F AMNT PAIN NOTED NONE PRSNT: CPT | Performed by: SURGERY

## 2025-07-16 RX ORDER — SERTRALINE HYDROCHLORIDE 50 MG/1
TABLET, FILM COATED ORAL
COMMUNITY
Start: 2025-07-14

## 2025-07-16 ASSESSMENT — PAIN SCALES - GENERAL: PAINLEVEL_OUTOF10: 0-NO PAIN

## 2025-07-18 ENCOUNTER — PRE-ADMISSION TESTING (OUTPATIENT)
Dept: PREADMISSION TESTING | Facility: HOSPITAL | Age: 70
End: 2025-07-18
Payer: MEDICARE

## 2025-07-18 ENCOUNTER — HOSPITAL ENCOUNTER (OUTPATIENT)
Dept: CARDIOLOGY | Facility: HOSPITAL | Age: 70
Discharge: HOME | End: 2025-07-18
Payer: MEDICARE

## 2025-07-18 VITALS
HEART RATE: 83 BPM | HEIGHT: 62 IN | OXYGEN SATURATION: 94 % | BODY MASS INDEX: 33.86 KG/M2 | TEMPERATURE: 98.3 F | DIASTOLIC BLOOD PRESSURE: 71 MMHG | WEIGHT: 184 LBS | SYSTOLIC BLOOD PRESSURE: 106 MMHG

## 2025-07-18 DIAGNOSIS — Z41.9 SURGERY, ELECTIVE: ICD-10-CM

## 2025-07-18 DIAGNOSIS — Z41.9 SURGERY, ELECTIVE: Primary | ICD-10-CM

## 2025-07-18 LAB
ABO GROUP (TYPE) IN BLOOD: NORMAL
ANTIBODY SCREEN: NORMAL
RH FACTOR (ANTIGEN D): NORMAL

## 2025-07-18 PROCEDURE — 93010 ELECTROCARDIOGRAM REPORT: CPT | Performed by: INTERNAL MEDICINE

## 2025-07-18 PROCEDURE — 87081 CULTURE SCREEN ONLY: CPT

## 2025-07-18 PROCEDURE — 99205 OFFICE O/P NEW HI 60 MIN: CPT | Performed by: ANESTHESIOLOGY

## 2025-07-18 PROCEDURE — 36415 COLL VENOUS BLD VENIPUNCTURE: CPT

## 2025-07-18 PROCEDURE — 86901 BLOOD TYPING SEROLOGIC RH(D): CPT

## 2025-07-18 PROCEDURE — 93005 ELECTROCARDIOGRAM TRACING: CPT

## 2025-07-18 RX ORDER — CHLORHEXIDINE GLUCONATE 40 MG/ML
1 SOLUTION TOPICAL DAILY
Qty: 25 ML | Refills: 0 | Status: SHIPPED | OUTPATIENT
Start: 2025-07-18 | End: 2025-07-23

## 2025-07-18 RX ORDER — CHLORHEXIDINE GLUCONATE ORAL RINSE 1.2 MG/ML
15 SOLUTION DENTAL DAILY
Qty: 30 ML | Refills: 0 | Status: SHIPPED | OUTPATIENT
Start: 2025-07-18 | End: 2025-07-20

## 2025-07-18 ASSESSMENT — DUKE ACTIVITY SCORE INDEX (DASI)
CAN YOU DO YARD WORK LIKE RAKING LEAVES, WEEDING OR PUSHING A MOWER: YES
CAN YOU TAKE CARE OF YOURSELF (EAT, DRESS, BATHE, OR USE TOILET): YES
CAN YOU RUN A SHORT DISTANCE: NO
CAN YOU WALK A BLOCK OR TWO ON LEVEL GROUND: YES
CAN YOU WALK INDOORS, SUCH AS AROUND YOUR HOUSE: YES
CAN YOU CLIMB A FLIGHT OF STAIRS OR WALK UP A HILL: YES
CAN YOU PARTICIPATE IN MODERATE RECREATIONAL ACTIVITIES LIKE GOLF, BOWLING, DANCING, DOUBLES TENNIS OR THROWING A BASEBALL OR FOOTBALL: YES
CAN YOU DO LIGHT WORK AROUND THE HOUSE LIKE DUSTING OR WASHING DISHES: YES
CAN YOU DO HEAVY WORK AROUND THE HOUSE LIKE SCRUBBING FLOORS OR LIFTING AND MOVING HEAVY FURNITURE: NO
TOTAL_SCORE: 34.7
DASI METS SCORE: 7
CAN YOU PARTICIPATE IN STRENOUS SPORTS LIKE SWIMMING, SINGLES TENNIS, FOOTBALL, BASKETBALL, OR SKIING: NO
CAN YOU HAVE SEXUAL RELATIONS: YES
CAN YOU DO MODERATE WORK AROUND THE HOUSE LIKE VACUUMING, SWEEPING FLOORS OR CARRYING GROCERIES: YES

## 2025-07-18 ASSESSMENT — ENCOUNTER SYMPTOMS
DIFFICULTY URINATING: 0
NECK NEGATIVE: 1
DIARRHEA: 0
BRUISES/BLEEDS EASILY: 0
PALPITATIONS: 0
CONSTIPATION: 0
RESPIRATORY NEGATIVE: 1
NEUROLOGICAL NEGATIVE: 1
WHEEZING: 0
CONSTITUTIONAL NEGATIVE: 1
ENDOCRINE NEGATIVE: 1
COUGH: 0
ABDOMINAL PAIN: 0

## 2025-07-18 NOTE — CPM/PAT H&P
CPM/PAT Evaluation       Name: Dayan Lopez (Dayan Lopez)  /Age: 1955/69 y.o.     In-Person       Chief Complaint: Distal Pancreatectomy / Splenectomy  on 25     HPI 69 y.o.  female  scheduled for Distal Pancreatectomy / Splenectomy  on 25 with Dr. Fong for  pancreatic mass.  PMHX includes HTN, HLD, NSTEMI s/p PCI in , CHF, endometrial CA s/p hysterectomy, CKD .  Presents to [facility] today for perioperative risk stratification and optimization    Medical History[1]    Surgical History[2]    Patient  has no history on file for sexual activity.    Family History[3]    Allergies[4]    Prior to Admission medications   Medication Sig Start Date End Date Taking? Authorizing Provider   atorvastatin (Lipitor) 10 mg tablet Take 1 tablet (10 mg) by mouth once daily. 12   Historical Provider, MD   calcium carbonate (Tums) 200 mg calcium chewable tablet Chew and swallow 1 tablet once daily.    Historical Provider, MD   cetirizine (ZyrTEC) 5 mg chewable tablet Chew and swallow 1 tablet (5 mg) once daily. 12/9/10   Historical Provider, MD   cholecalciferol (Vitamin D-3) 50 MCG (2000 UT) tablet Take 1 tablet (50 mcg) by mouth once daily. 24  MEMO Menjivar   dapagliflozin propanediol (Farxiga) 10 mg Take 1 tablet (10 mg) by mouth once daily. 24  Maurice Sierra MD   levothyroxine (Synthroid, Levoxyl) 25 mcg tablet Take 1 tablet (25 mcg) by mouth once daily. 25   MEMO Menjivar   lutein 20 mg capsule Take by mouth.    Historical Provider, MD   metoprolol succinate XL (Toprol-XL) 25 mg 24 hr tablet Take 1 tablet (25 mg) by mouth every 12 hours. 24   Historical Provider, MD   omeprazole (PriLOSEC) 20 mg DR capsule  24   Historical Provider, MD   sacubitriL-valsartan (Entresto) 49-51 mg tablet 2 times a day. 24   Historical Provider, MD   sertraline (Zoloft) 50 mg tablet  7/14/25   Historical Provider, MD   sertraline (Zoloft) 25 mg  tablet Take 1 tablet (25 mg) by mouth once daily.  Patient not taking: Reported on 7/16/2025 7/16/25  Historical Provider, MD AUGUSTE ROS:   Constitutional:   neg    Neuro/Psych:   neg    Eyes:   Ears:   Nose:   Mouth:   Throat:   Neck:   neg    Cardio:    no chest pain   no palpitations   no peripheral edema  Respiratory:   neg     no cough   no wheezing  Endocrine:   neg    GI:    no abdominal pain   no constipation   no diarrhea  :    no difficulty urinating  Musculoskeletal:   Hematologic:    does not bruise/bleed easily  Skin:      Physical Exam  Vitals reviewed.   HENT:      Head: Normocephalic and atraumatic.     Cardiovascular:      Rate and Rhythm: Normal rate and regular rhythm.      Pulses: Normal pulses.      Heart sounds: Normal heart sounds.   Pulmonary:      Effort: Pulmonary effort is normal.      Breath sounds: Normal breath sounds.   Abdominal:      General: Abdomen is flat. Bowel sounds are normal.      Palpations: Abdomen is soft.     Musculoskeletal:         General: Normal range of motion.     Skin:     General: Skin is warm and dry.     Neurological:      General: No focal deficit present.      Mental Status: She is alert and oriented to person, place, and time. Mental status is at baseline.     Psychiatric:         Mood and Affect: Mood normal.         Behavior: Behavior normal.         Thought Content: Thought content normal.         Judgment: Judgment normal.          PAT AIRWAY:   Airway:     Mallampati::  II    TM distance::  >3 FB    Neck ROM::  Full         Testing/Diagnostic:     Patient Specialist/PCP:     Visit Vitals  OB Status Hysterectomy   Smoking Status Never       DASI Risk Score      Flowsheet Row Questionnaire Series Submission from 7/15/2025 in [facility] with Generic Provider Jd   Can you take care of yourself (eat, dress, bathe, or use toilet)?  2.75  filed at 07/15/2025 8893   Can you walk indoors, such as around your house? 1.75   filed at 07/15/2025 1643   Can you walk a block or two on level ground?  2.75  filed at 07/15/2025 1643   Can you climb a flight of stairs or walk up a hill? 5.5  filed at 07/15/2025 1643   Can you run a short distance? 0  filed at 07/15/2025 1643   Can you do light work around the house like dusting or washing dishes? 2.7  filed at 07/15/2025 1643   Can you do moderate work around the house like vacuuming, sweeping floors or carrying groceries? 3.5  filed at 07/15/2025 1643   Can you do heavy work around the house like scrubbing floors or lifting and moving heavy furniture?  0  filed at 07/15/2025 1643   Can you do yard work like raking leaves, weeding or pushing a mower? 4.5  filed at 07/15/2025 1643   Can you have sexual relations? 5.25  filed at 07/15/2025 1643   Can you participate in moderate recreational activities like golf, bowling, dancing, doubles tennis or throwing a baseball or football? 6  filed at 07/15/2025 1643   Can you participate in strenous sports like swimming, singles tennis, football, basketball, or skiing? 0  filed at 07/15/2025 1643   DASI SCORE 34.7  filed at 07/15/2025 1643   METS Score (Will be calculated only when all the questions are answered) 7  filed at 07/15/2025 1643     Caprini DVT Assessment    No data to display  Modified Frailty Index    No data to display  [id number] Stroke Risk Points         N/A 0 to 9 Points:      Last Change: N/A          The [id number] risk score (Lip GH, et al. 2009. © 2010 [employer]) quantifies the risk of stroke for a patient with atrial fibrillation. For patients without atrial fibrillation or under the age of 18 this score appears as N/A. Higher score values generally indicate higher risk of stroke.        This score is not applicable to this patient. Components are not calculated.          Revised Cardiac Risk Index    No data to display  Apfel Simplified Score    No data to display  Risk Analysis Index Results This  Encounter    No data found in the last 10 encounters.       Stop Bang Score      Flowsheet Row Questionnaire Series Submission from 7/15/2025 in [facility] with Generic Provider Jd Endoscopic Ultrasound (Upper) from 6/27/2025 in [facility] with Joann Hilario MD   Do you snore loudly? 0  filed at 07/15/2025 1643 0 filed at 06/27/2025 1229   Do you often feel tired or fatigued after your sleep? 0  filed at 07/15/2025 1643 0 filed at 06/27/2025 1229   Has anyone ever observed you stop breathing in your sleep? 0  filed at 07/15/2025 1643 0 filed at 06/27/2025 1229   Do you have or are you being treated for high blood pressure? 1  filed at 07/15/2025 1643 0 filed at 06/27/2025 1229   Recent BMI (Calculated) 32.7  filed at 07/15/2025 1643 32.4 filed at 06/27/2025 1229   Is BMI greater than 35 kg/m2? 0=No  filed at 07/15/2025 1643 0=No filed at 06/27/2025 1229   Age older than 50 years old? 1=Yes  filed at 07/15/2025 1643 1=Yes filed at 06/27/2025 1229   Gender - Male 0=No  filed at 07/15/2025 1643 0=No filed at 06/27/2025 1229     Prodigy: High Risk  Total Score: 8              Prodigy Age Score           ARISCAT Score for Postoperative Pulmonary Complications    No data to display  Juan F Perioperative Risk for Myocardial Infarction or Cardiac Arrest (LUCRETIA)    No data to display        Assessment and Plan     History of Present Illness     69 y.o.  female  scheduled for Distal Pancreatectomy / Splenectomy  on 7/28/25 with Dr. Fong for  pancreatic mass.  PMHX includes HTN, HLD, NSTEMI s/p PCI in 2007, CHF, endometrial CA s/p hysterectomy, CKD .  Presents to CPM today for perioperative risk stratification and optimization      Neuro   Hx of Anxiety     No neurologic diagnosis, however, the patient is at increased risk for perioperative delirium secondary to  age, renal insufficiency, type and duration of surgery, Patient instructed on and provided cognitive  exercises  Patient is at increased risk for perioperative CVA secondary to  cardiac disease, HTN, operative time > 2.5 hours      HEENT   No HEENT diagnosis or significant findings on chart review or clinical presentation and evaluation. No further preoperative testing/intervention indicated at this time.      Cardiovascular   Hx of HLD controlled with atorvastatin  Hx of NSTEMI in 7/2007 s/p PCI was on plavix and currently not on any antiplatelet   Hx of HTN and CHF, patient currently on entresto (hold evening dose before surgery and morning of) and metoprolol and farxiga (hold 3 days before)    METS: 7  RCRI: 3 points, 15% risk for postoperative MACE   LUCRETIA: 1.19% risk for 30-day postoperative MACE  EKG - 7/18/25  -     EKG 2/8/2017  Sinus rhythm   Borderline left axis deviation   Nonspecific T abnormalities    Patient gets cardiac care at [facility], we have requested those records to be sent over and will follow up if any further testing is indicated.     Stress Echo  07/18/2024:   LVEF 30-35%. No regional wall motion abnormalities. Dilated LV with severe global LV dysfunction. No signs of stress induced ischemia.     ECHO 2/7/2017   Conclusions:   1. The left ventricular chamber size is normal.   2. There is mildly decreased left ventricular systolic function.   3. The estimated ejection fraction is 40- 45%.   4. There is apical hypokinesis but significantly improved compared with   early LV angiogram today.   5. Grade I left ventricular diastolic dysfunction.   6. Mild to moderate mitral regurgitation by color flow Doppler.   7. Trace tricuspid regurgitation present by color flow Doppler.     Comments:   There is apical hypokinesis but significantly improved compared with   early LV angiogram today. LV systolic function is also improved.     Heart Cath 2/26/2017  CONCLUSIONS:   1. Nonobstructive coronary artery disease with patent stent to the right      coronary artery, and moderate disease in the mid  left anterior      descending coronary artery, ostium of the second obtuse marginal and      distal left circumflex coronary artery.   2. Moderate reduction of left ventricular systolic function with wall      motion abnormalities as described above.   PLAN:  Based on results, the patient has dyskinesis of the apex with   inferior hypokinesis.  The left anterior descending coronary artery is a   patent vessel and supplies the apex.  More likely, accompanied with a   stress related cardiomyopathy.  Will follow-up clinically.     Pulmonary   Left lower lobe pulmonary nodule first identified on CT urography performed on 5/6/2025, demonstrating a 1 x 2.5 cm elongated tubular nodular opacity in the posteromedial basilar segment of the left lower lobe. PET-CT performed 6/2/2025 demonstrated that the nodule was non-FDG avid. Saw Dr. Srivastava who believes lung nodule is unlikely to represent a malignant process.     No pulmonary diagnosis, however patient is at increased risk of perioperative complications secondary to  age > 60, obesity, major surgery, duration of surgery > 2 hours, types of anesthetic    Stop Bang score is 3 placing patient at moderate risk for GRACIELA  ARISCAT: >45 points, 42.1% risk of in-hospital postoperative pulmonary complication  PRODIGY: High risk for opioid-induced respiratory depression  Pumonary toilet education discussed, patient also provided deep breathing exercises and incentive spirometry educational handout    Renal/GYN   Hx of CKD follows with nephrology     No renal diagnosis, however patient is at increase risk for perioperative renal complications secondary to  Age equal to or greater than 56, BMI equal to or greater than 30, HTN, renal insufficiency , use of an ace, arb, or NSAID    Hx of endometrial CA s/p hysterectomy    Endocrine   Hx of hypothyroidism controlled on levothyroxine      Hematologic   No hematologic diagnosis, however patient is at an increased risk for DVT  Caprini Score  12, patient at High risk for perioperative DVT.  Patient provided VTE education/handout.      Gastrointestinal   Hx of GERD well controlled on medications    Eat-10 score 0      Infectious Disease   No infectious diagnosis or significant findings on chart review or clinical presentation and evaluation.   Prescription provided for CHG body wash and dental rinse. CHG use instructions reviewed and provided to patient.  Staph screen collected      Musculoskeletal   No diagnosis or significant findings on chart review or clinical presentation and evaluation.       Anesthesia/Airway   No anesthesia complications        Medication, NPO, and all other CPM instructions were reviewed with the patient.  All patient questions were addressed.    Patient staffed with Dr. Tse             [1]   Past Medical History:  Diagnosis Date    Allergic 1979    Anxiety 2008    On sertraline    Benign paroxysmal positional vertigo 07/28/2009    Chronic kidney disease 12/2024    On farxiga    Coronary artery disease 07/2007    COVID     Dizziness About 7 years ago    Have had therapy in the past    Endometrial cancer (Multi) 10/24/2023    Epistaxis ER 11/23. [facility]    Childhood    Fractures 2000(?)    Have 2 plates and 1 riri in my rt leg    GERD (gastroesophageal reflux disease) 1990?    On omeprazole    Heart disease 2007    Hernia, internal During my routine colonoscopies- 10 years or so    Never treated    Hypertension 2007    Hypothyroidism 11/2023    On levothyroxine    MI (myocardial infarction) (Multi) 03/18/2013    Formatting of this note might be different from the original. 2007    Old myocardial infarction     History of myocardial infarction    Personal history of malignant neoplasm of other parts of uterus     History of cancer of uterus    Personal history of other diseases of the digestive system     History of gastroesophageal reflux (GERD)    Personal history of other specified conditions     History of vertigo     Uterine cancer (Multi) 10/24/2023   [2]   Past Surgical History:  Procedure Laterality Date    CARDIAC CATHETERIZATION  07/2007    COLONOSCOPY  10/11/2019    diverticulosis, otherwise normal, due in 2029    CORONARY ANGIOPLASTY WITH STENT PLACEMENT  2007    FRACTURE SURGERY  04/2000    HYSTERECTOMY  12/05/2014    Hysterectomy    OTHER SURGICAL HISTORY  12/05/2014    Transcath Placement Of Intrathoracic Carotid Artery Stent    OTHER SURGICAL HISTORY  12/05/2014    Treatment Of The Right Leg   [3]   Family History  Problem Relation Name Age of Onset    Heart disease Mother Hillary     Osteoporosis Mother Hillary     Stroke Mother Hillary 40 - 49    Liver cancer Father Sheldon     Diabetes Father Sheldon 50 - 59    Cancer Father Sheldon 60 - 69    Heart disease Sister Yesi 50 - 59    Heart disease Brother      Cancer Brother  50 - 59        hpv oral    Breast cancer Mother's Sister Susan     Cancer Mother's Sister Susan 50 - 59    Heart disease Brother Arturo 60 - 69    Cancer Mother's Brother Robert 60 - 69    Miscarriages / Stillbirths Mother Hillary 20 - 29    Heart disease Brother Rj 60 - 69   [4]   Allergies  Allergen Reactions    Demerol [Meperidine] Other    Meperidine (Pf) Hives and Itching

## 2025-07-18 NOTE — H&P (VIEW-ONLY)
CPM/PAT Evaluation       Name: Dayan Lopez (Dayan Lopez)  /Age: 1955/69 y.o.     In-Person       Chief Complaint: Distal Pancreatectomy / Splenectomy  on 25     HPI 69 y.o.  female  scheduled for Distal Pancreatectomy / Splenectomy  on 25 with Dr. Fong for  pancreatic mass.  PMHX includes HTN, HLD, NSTEMI s/p PCI in , CHF, endometrial CA s/p hysterectomy, CKD .  Presents to [facility] today for perioperative risk stratification and optimization    Medical History[1]    Surgical History[2]    Patient  has no history on file for sexual activity.    Family History[3]    Allergies[4]    Prior to Admission medications   Medication Sig Start Date End Date Taking? Authorizing Provider   atorvastatin (Lipitor) 10 mg tablet Take 1 tablet (10 mg) by mouth once daily. 12   Historical Provider, MD   calcium carbonate (Tums) 200 mg calcium chewable tablet Chew and swallow 1 tablet once daily.    Historical Provider, MD   cetirizine (ZyrTEC) 5 mg chewable tablet Chew and swallow 1 tablet (5 mg) once daily. 12/9/10   Historical Provider, MD   cholecalciferol (Vitamin D-3) 50 MCG (2000 UT) tablet Take 1 tablet (50 mcg) by mouth once daily. 24  MEMO Menjivar   dapagliflozin propanediol (Farxiga) 10 mg Take 1 tablet (10 mg) by mouth once daily. 24  Maurice Sierra MD   levothyroxine (Synthroid, Levoxyl) 25 mcg tablet Take 1 tablet (25 mcg) by mouth once daily. 25   MEMO Menjivar   lutein 20 mg capsule Take by mouth.    Historical Provider, MD   metoprolol succinate XL (Toprol-XL) 25 mg 24 hr tablet Take 1 tablet (25 mg) by mouth every 12 hours. 24   Historical Provider, MD   omeprazole (PriLOSEC) 20 mg DR capsule  24   Historical Provider, MD   sacubitriL-valsartan (Entresto) 49-51 mg tablet 2 times a day. 24   Historical Provider, MD   sertraline (Zoloft) 50 mg tablet  7/14/25   Historical Provider, MD   sertraline (Zoloft) 25 mg  tablet Take 1 tablet (25 mg) by mouth once daily.  Patient not taking: Reported on 7/16/2025 7/16/25  Historical Provider, MD AUGUSTE ROS:   Constitutional:   neg    Neuro/Psych:   neg    Eyes:   Ears:   Nose:   Mouth:   Throat:   Neck:   neg    Cardio:    no chest pain   no palpitations   no peripheral edema  Respiratory:   neg     no cough   no wheezing  Endocrine:   neg    GI:    no abdominal pain   no constipation   no diarrhea  :    no difficulty urinating  Musculoskeletal:   Hematologic:    does not bruise/bleed easily  Skin:      Physical Exam  Vitals reviewed.   HENT:      Head: Normocephalic and atraumatic.     Cardiovascular:      Rate and Rhythm: Normal rate and regular rhythm.      Pulses: Normal pulses.      Heart sounds: Normal heart sounds.   Pulmonary:      Effort: Pulmonary effort is normal.      Breath sounds: Normal breath sounds.   Abdominal:      General: Abdomen is flat. Bowel sounds are normal.      Palpations: Abdomen is soft.     Musculoskeletal:         General: Normal range of motion.     Skin:     General: Skin is warm and dry.     Neurological:      General: No focal deficit present.      Mental Status: She is alert and oriented to person, place, and time. Mental status is at baseline.     Psychiatric:         Mood and Affect: Mood normal.         Behavior: Behavior normal.         Thought Content: Thought content normal.         Judgment: Judgment normal.          PAT AIRWAY:   Airway:     Mallampati::  II    TM distance::  >3 FB    Neck ROM::  Full         Testing/Diagnostic:     Patient Specialist/PCP:     Visit Vitals  OB Status Hysterectomy   Smoking Status Never       DASI Risk Score      Flowsheet Row Questionnaire Series Submission from 7/15/2025 in [facility] with Generic Provider Jd   Can you take care of yourself (eat, dress, bathe, or use toilet)?  2.75  filed at 07/15/2025 5813   Can you walk indoors, such as around your house? 1.75   filed at 07/15/2025 1643   Can you walk a block or two on level ground?  2.75  filed at 07/15/2025 1643   Can you climb a flight of stairs or walk up a hill? 5.5  filed at 07/15/2025 1643   Can you run a short distance? 0  filed at 07/15/2025 1643   Can you do light work around the house like dusting or washing dishes? 2.7  filed at 07/15/2025 1643   Can you do moderate work around the house like vacuuming, sweeping floors or carrying groceries? 3.5  filed at 07/15/2025 1643   Can you do heavy work around the house like scrubbing floors or lifting and moving heavy furniture?  0  filed at 07/15/2025 1643   Can you do yard work like raking leaves, weeding or pushing a mower? 4.5  filed at 07/15/2025 1643   Can you have sexual relations? 5.25  filed at 07/15/2025 1643   Can you participate in moderate recreational activities like golf, bowling, dancing, doubles tennis or throwing a baseball or football? 6  filed at 07/15/2025 1643   Can you participate in strenous sports like swimming, singles tennis, football, basketball, or skiing? 0  filed at 07/15/2025 1643   DASI SCORE 34.7  filed at 07/15/2025 1643   METS Score (Will be calculated only when all the questions are answered) 7  filed at 07/15/2025 1643     Caprini DVT Assessment    No data to display  Modified Frailty Index    No data to display  [id number] Stroke Risk Points         N/A 0 to 9 Points:      Last Change: N/A          The [id number]-[id number] risk score (Lip GH, et al. 2009. © 2010 [employer]) quantifies the risk of stroke for a patient with atrial fibrillation. For patients without atrial fibrillation or under the age of 18 this score appears as N/A. Higher score values generally indicate higher risk of stroke.        This score is not applicable to this patient. Components are not calculated.          Revised Cardiac Risk Index    No data to display  Apfel Simplified Score    No data to display  Risk Analysis Index Results This  Encounter    No data found in the last 10 encounters.       Stop Bang Score      Flowsheet Row Questionnaire Series Submission from 7/15/2025 in [facility] [place] with Generic Provider Jd Endoscopic Ultrasound (Upper) from 6/27/2025 in [facility] with Joann Hilario MD   Do you snore loudly? 0  filed at 07/15/2025 1643 0 filed at 06/27/2025 1229   Do you often feel tired or fatigued after your sleep? 0  filed at 07/15/2025 1643 0 filed at 06/27/2025 1229   Has anyone ever observed you stop breathing in your sleep? 0  filed at 07/15/2025 1643 0 filed at 06/27/2025 1229   Do you have or are you being treated for high blood pressure? 1  filed at 07/15/2025 1643 0 filed at 06/27/2025 1229   Recent BMI (Calculated) 32.7  filed at 07/15/2025 1643 32.4 filed at 06/27/2025 1229   Is BMI greater than 35 kg/m2? 0=No  filed at 07/15/2025 1643 0=No filed at 06/27/2025 1229   Age older than 50 years old? 1=Yes  filed at 07/15/2025 1643 1=Yes filed at 06/27/2025 1229   Gender - Male 0=No  filed at 07/15/2025 1643 0=No filed at 06/27/2025 1229     Prodigy: High Risk  Total Score: 8              Prodigy Age Score           ARISCAT Score for Postoperative Pulmonary Complications    No data to display  Juan F Perioperative Risk for Myocardial Infarction or Cardiac Arrest (LUCRETIA)    No data to display        Assessment and Plan     History of Present Illness     69 y.o.  female  scheduled for Distal Pancreatectomy / Splenectomy  on 7/28/25 with Dr. Fong for  pancreatic mass.  PMHX includes HTN, HLD, NSTEMI s/p PCI in 2007, CHF, endometrial CA s/p hysterectomy, CKD .  Presents to CPM today for perioperative risk stratification and optimization      Neuro   Hx of Anxiety     No neurologic diagnosis, however, the patient is at increased risk for perioperative delirium secondary to  age, renal insufficiency, type and duration of surgery, Patient instructed on and provided cognitive  exercises  Patient is at increased risk for perioperative CVA secondary to  cardiac disease, HTN, operative time > 2.5 hours      HEENT   No HEENT diagnosis or significant findings on chart review or clinical presentation and evaluation. No further preoperative testing/intervention indicated at this time.      Cardiovascular   Hx of HLD controlled with atorvastatin  Hx of NSTEMI in 7/2007 s/p PCI was on plavix and currently not on any antiplatelet   Hx of HTN and CHF, patient currently on entresto (hold evening dose before surgery and morning of) and metoprolol and farxiga (hold 3 days before)    METS: 7  RCRI: 3 points, 15% risk for postoperative MACE   LUCRETIA: 1.19% risk for 30-day postoperative MACE  EKG - 7/18/25  -     EKG 2/8/2017  Sinus rhythm   Borderline left axis deviation   Nonspecific T abnormalities    Patient gets cardiac care at [facility], we have requested those records to be sent over and will follow up if any further testing is indicated.     Stress Echo  07/18/2024:   LVEF 30-35%. No regional wall motion abnormalities. Dilated LV with severe global LV dysfunction. No signs of stress induced ischemia.     ECHO 2/7/2017   Conclusions:   1. The left ventricular chamber size is normal.   2. There is mildly decreased left ventricular systolic function.   3. The estimated ejection fraction is 40- 45%.   4. There is apical hypokinesis but significantly improved compared with   early LV angiogram today.   5. Grade I left ventricular diastolic dysfunction.   6. Mild to moderate mitral regurgitation by color flow Doppler.   7. Trace tricuspid regurgitation present by color flow Doppler.     Comments:   There is apical hypokinesis but significantly improved compared with   early LV angiogram today. LV systolic function is also improved.     Heart Cath 2/26/2017  CONCLUSIONS:   1. Nonobstructive coronary artery disease with patent stent to the right      coronary artery, and moderate disease in the mid  left anterior      descending coronary artery, ostium of the second obtuse marginal and      distal left circumflex coronary artery.   2. Moderate reduction of left ventricular systolic function with wall      motion abnormalities as described above.   PLAN:  Based on results, the patient has dyskinesis of the apex with   inferior hypokinesis.  The left anterior descending coronary artery is a   patent vessel and supplies the apex.  More likely, accompanied with a   stress related cardiomyopathy.  Will follow-up clinically.     Pulmonary   Left lower lobe pulmonary nodule first identified on CT urography performed on 5/6/2025, demonstrating a 1 x 2.5 cm elongated tubular nodular opacity in the posteromedial basilar segment of the left lower lobe. PET-CT performed 6/2/2025 demonstrated that the nodule was non-FDG avid. Saw Dr. Srivastava who believes lung nodule is unlikely to represent a malignant process.     No pulmonary diagnosis, however patient is at increased risk of perioperative complications secondary to  age > 60, obesity, major surgery, duration of surgery > 2 hours, types of anesthetic    Stop Bang score is 3 placing patient at moderate risk for GRACIELA  ARISCAT: >45 points, 42.1% risk of in-hospital postoperative pulmonary complication  PRODIGY: High risk for opioid-induced respiratory depression  Pumonary toilet education discussed, patient also provided deep breathing exercises and incentive spirometry educational handout    Renal/GYN   Hx of CKD follows with nephrology     No renal diagnosis, however patient is at increase risk for perioperative renal complications secondary to  Age equal to or greater than 56, BMI equal to or greater than 30, HTN, renal insufficiency , use of an ace, arb, or NSAID    Hx of endometrial CA s/p hysterectomy    Endocrine   Hx of hypothyroidism controlled on levothyroxine      Hematologic   No hematologic diagnosis, however patient is at an increased risk for DVT  Caprini Score  12, patient at High risk for perioperative DVT.  Patient provided VTE education/handout.      Gastrointestinal   Hx of GERD well controlled on medications    Eat-10 score 0      Infectious Disease   No infectious diagnosis or significant findings on chart review or clinical presentation and evaluation.   Prescription provided for CHG body wash and dental rinse. CHG use instructions reviewed and provided to patient.  Staph screen collected      Musculoskeletal   No diagnosis or significant findings on chart review or clinical presentation and evaluation.       Anesthesia/Airway   No anesthesia complications        Medication, NPO, and all other CPM instructions were reviewed with the patient.  All patient questions were addressed.    Patient staffed with Dr. Tse             [1]   Past Medical History:  Diagnosis Date    Allergic 1979    Anxiety 2008    On sertraline    Benign paroxysmal positional vertigo 07/28/2009    Chronic kidney disease 12/2024    On farxiga    Coronary artery disease 07/2007    COVID     Dizziness About 7 years ago    Have had therapy in the past    Endometrial cancer (Multi) 10/24/2023    Epistaxis ER 11/23. [facility]    Childhood    Fractures 2000(?)    Have 2 plates and 1 riri in my rt leg    GERD (gastroesophageal reflux disease) 1990?    On omeprazole    Heart disease 2007    Hernia, internal During my routine colonoscopies- 10 years or so    Never treated    Hypertension 2007    Hypothyroidism 11/2023    On levothyroxine    MI (myocardial infarction) (Multi) 03/18/2013    Formatting of this note might be different from the original. 2007    Old myocardial infarction     History of myocardial infarction    Personal history of malignant neoplasm of other parts of uterus     History of cancer of uterus    Personal history of other diseases of the digestive system     History of gastroesophageal reflux (GERD)    Personal history of other specified conditions     History of vertigo     Uterine cancer (Multi) 10/24/2023   [2]   Past Surgical History:  Procedure Laterality Date    CARDIAC CATHETERIZATION  07/2007    COLONOSCOPY  10/11/2019    diverticulosis, otherwise normal, due in 2029    CORONARY ANGIOPLASTY WITH STENT PLACEMENT  2007    FRACTURE SURGERY  04/2000    HYSTERECTOMY  12/05/2014    Hysterectomy    OTHER SURGICAL HISTORY  12/05/2014    Transcath Placement Of Intrathoracic Carotid Artery Stent    OTHER SURGICAL HISTORY  12/05/2014    Treatment Of The Right Leg   [3]   Family History  Problem Relation Name Age of Onset    Heart disease Mother Hillary     Osteoporosis Mother Hillary     Stroke Mother Hillary 40 - 49    Liver cancer Father Sheldon     Diabetes Father Sheldon 50 - 59    Cancer Father Sheldon 60 - 69    Heart disease Sister Yesi 50 - 59    Heart disease Brother      Cancer Brother  50 - 59        hpv oral    Breast cancer Mother's Sister Susan     Cancer Mother's Sister Susan 50 - 59    Heart disease Brother Arturo 60 - 69    Cancer Mother's Brother Robert 60 - 69    Miscarriages / Stillbirths Mother Hillary 20 - 29    Heart disease Brother Rj 60 - 69   [4]   Allergies  Allergen Reactions    Demerol [Meperidine] Other    Meperidine (Pf) Hives and Itching

## 2025-07-18 NOTE — PREPROCEDURE INSTRUCTIONS
Thank you for visiting [facility] today for your pre-procedure evaluation, you were seen by Dr. Fisher ([phone])     This summary includes instructions and information to aid you during your perioperative period.  Please read carefully. If you have any questions about your visit today, please call the number listed above.  If you become ill or have any changes to your health before your surgery, please contact your primary care provider and alert your surgeon.    Preparing for your Surgery       Exercises  Preoperative Deep Breathing Exercises  Why it is important to do deep breathing exercises before my surgery?  Deep breathing exercises strengthen your breathing muscles.  This helps you to recover after your surgery and decreases the chance of breathing complications.  How are the deep breathing exercises done?  Sit straight with your back supported.  Breathe in deeply and slowly through your nose. Your lower rib cage should expand and your abdomen may move forward.  Hold that breath for 3 to 5 seconds.  Breathe out through pursed lips, slowly and completely.  Rest and repeat 10 times every hour while awake.  Rest longer if you become dizzy or lightheaded.      Preoperative Brain Exercises    What are brain exercises?  A brain exercise is any activity that engages your thinking (cognitive) skills.    What types of activities are considered brain exercises?  Jigsaw puzzles, crossword puzzles, word jumble, memory games, word search, and many more.  Many can be found free online or on your phone via a mobile brandyn.    Why should I do brain exercises before my surgery?  More recent research has shown brain exercise before surgery can lower the risk of postoperative delirium (confusion) which can be especially important for older adults.  Patients who did brain exercises for 5 to 10 hours the days before surgery, cut their risk of postoperative delirium in half up to 1 week after  surgery.    Sit-to-Stand Exercise    What is the sit-to-stand exercise?  The sit-to-stand exercise strengthens the muscles of your lower body and muscles in the center of your body (core muscles for stability) helping to maintain and improve your strength and mobility.  How do I do the sit-to-stand exercise?  The goal is to do this exercise without using your arms or hands.  If this is too difficult, use your arms and hands or a chair with armrests to help slowly push yourself to the standing position and lower yourself back to the sitting position. As the movement becomes easier use your arms and hands less.    Steps to the sit-to-stand exercise  Sit up tall in a sturdy chair, knees bent, feet flat on the floor shoulder-width apart.  Shift your hips/pelvis forward in the chair to correctly position yourself for the next movement.  Lean forward at your hips.  Stand up straight putting equal weight on both feet.  Check to be sure you are properly aligned with the chair, in a slow controlled movement sit back down.  Repeat this exercise 10-15 times.  If needed you can do it fewer times until your strength improves.  Rest for 1 minute.  Do another 10-15 sit-to-stand exercises.  Try to do this in the morning and evening.        Instructions    Preoperative Fasting Guidelines    Why must I stop eating and drinking near surgery time?  With sedation, food or liquid in your stomach can enter your lungs causing serious complications  Food can increase nausea and vomiting  When do I need to stop eating and drinking before my surgery?      Do not eat any food after midnight the night before your surgery/procedure. You may have up to 13.5 ounces of clear liquid until TWO hours before your instructed arrival time to the hospital.  This includes water, black tea/coffee, (no milk or cream) apple juice, and electrolyte drinks (Gatorade). You may chew gum until TWO hours before your surgery/procedure            Simple things you can  do to help prevent blood clots     Blood clots are blockages that can form in the body's veins. When a blood clot forms in your deep veins, it may be called a deep vein thrombosis, or DVT for short. Blood clots can happen in any part of the body where blood flows, but they are most common in the arms and legs. If a piece of a blood clot breaks free and travels to the lungs, it is called a pulmonary embolus (PE). A PE can be a very serious problem.         Being in the hospital or having surgery can raise your chances of getting a blood clot because you may not be well enough to move around as much as you normally do.         Ways you can help prevent blood clots in the hospital       Wearing SCDs  SCDs stands for Sequential Compression Devices.   SCDs are special sleeves that wrap around your legs. They attach to a pump that fills them with air to gently squeeze your legs every few minutes.  This helps return the blood in your legs to your heart.   SCDs should only be taken off when walking or bathing. SCDs may not be comfortable, but they can help save your life.              Pump SCD leg sleeves  Wearing compression stockings - if your doctor orders them. These special snug-fitting stockings gently squeeze your legs to help blood flow.       Walking. Walking helps move the blood in your legs.   If your doctor says it is ok, try walking the halls at least   5 times a day. Ask us to help you get up, so you don't fall.      Taking any blood-thinning medicines your doctor orders.              Ways you can help prevent blood clots at home         Wearing compression stockings - if your doctor orders them.   Walking - to help move the blood in your legs.    Taking any blood-thinning medicines your doctor orders.      Signs of a blood clot or PE    Tell your doctor or nurse right away if you have any of the problems listed below.         If you are at home, seek medical care right away. Call 911 for chest pain or  "problems breathing.            Signs of a blood clot (DVT) - such as pain, swelling, redness, or warmth in your arm or legs.  Signs of a pulmonary embolism (PE) - such as chest pain or feeling short of breath      Tobacco and Alcohol;  Do not drink alcohol or smoke within 24 hours of surgery.  It is best to quit smoking for as long as possible before any surgery or procedure.         Other Instructions  Why did I have my nose, under my arms, and groin swabbed? The purpose of the swab is to identify Staphylococcus aureus inside your nose or on your skin.  The swab was sent to the laboratory for culture.  A positive swab/culture for Staphylococcus aureus is called colonization or carriage.   What is Staphylococcus aureus? Staphylococcus aureus, also known as \"staph\", is a germ found on the skin or in the nose of healthy people.  Sometimes Staphylococcus aureus can get into the body and cause an infection.  This can be minor (such as pimples, boils, or other skin problems).  It might also be serious (such as a blood infection, pneumonia, or a surgical site infection). What is Staphylococcus aureus colonization or carriage? Colonization or carriage means that a person has the germ but is not sick from it.  These bacteria can be spread on the hands or when breathing or sneezing. How is Staphylococcus aureus spread? It is most often spread by close contact with a person or item that carries it. What happens if my culture is positive for Staphylococcus aureus? Your doctor/medical team will use this information to guide any antibiotic treatment which may be necessary.  Regardless of the culture results, we will clean the inside of your nose with a betadine swab just before you have your surgery. Will I get an infection if I have Staphylococcus aureus in my nose or on my skin? Anyone can get an infection with Staphylococcus aureus.  However, the best way to reduce your risk of infection is to follow the instructions provided " to you for the use of your CHG soap and dental rinse.  , Body Wash; What is a home preoperative antibacterial shower? This shower is a way of cleaning the skin with a germ-killing solution before surgery.  The solution contains chlorhexidine, commonly known as CHG.  CHG is a skin cleanser with germ-killing ability.  Let your doctor know if you are allergic to chlorhexidine. Why do I need to take a preoperative antibacterial shower? Skin is not sterile.  It is best to try to make your skin as free of germs as possible before surgery.  Proper cleansing with a germ-killing soap before surgery can lower the number of germs on your skin.  This helps to reduce the risk of infection at the surgical site.  Following the instructions listed below will help you prepare your skin for surgery.   How do I use the solution? Steps:  Begin using your CHG soap 5 days before your scheduled surgery on ___________.   First, wash and rinse your hair using the CHG soap. Keep CHG soap away from ear canals and eyes.  Rinse completely, do not condition.  Hair extensions should be removed. , Oral/Dental Rinse: What is oral/dental rinse?  It is a mouthwash. It is a way of cleaning the mouth with a germ-killing solution before your surgery.  The solution contains chlorhexidine, commonly known as CHG. It is used inside the mouth to kill a bacteria known as Staphylococcus aureus.  Let your doctor know if you are allergic to Chlorhexidine. Why do I need to use CHG oral/dental rinse? The CHG oral/dental rinse helps to kill a bacteria in your mouth known as Staphylococcus aureus.  This reduces the risk of infection at the surgical site.  Using your CHG oral/dental rinse STEPS: Use your CHG oral/dental rinse after you brush your teeth the night before (at bedtime) and the morning of your surgery.  Follow all directions on your prescription label.  Use the cap on the container to measure 15 ml.  Swish (gargle if you can) the mouthwash in your mouth  for at least 30 seconds, (do not swallow) and spit out.  After you use your CHG rinse, do not rinse your mouth with water, drink or eat.  Please refer to the prescription label for the appropriate time to resume oral intake What side effects might I have using the CHG oral/dental rinse? CHG rinse will stick to plaque on the teeth.  Brush and floss just before use.  Teeth brushing will help avoid staining of plaque during use.             The Week before Surgery        Seven days before Surgery  Check your [facility] medication instructions  Do the exercises provided to you by [facility]   Arrange for a responsible, adult licensed  to take you home after surgery and stay with you for 24 hours.  You will not be permitted to drive yourself home if you have received any anesthetic/sedation  Six days before surgery  Check your [facility] medication instructions  Do the exercises provided to you by [facility]   Start using Chlorhexidene (CHG) body wash if prescribed  Five days before surgery  Check your [facility] medication instructions  Do the exercises provided to you by [facility]   Continue to use CHG body wash if prescribed  Three days before surgery  Check your [facility] medication instructions  Do the exercises provided to you by [facility]   Continue to use CHG body wash if prescribed  Two days before surgery  Check your [facility] medication instructions  Do the exercises provided to you by [facility]   Continue to use CHG body wash if prescribed    The Day before Surgery       Check your [facility] medication and all other [facility] instructions including when to stop eating and drinking  You will be called with your arrival time for surgery in the late afternoon.  If you do not receive a call please reach out to your surgeon's office.  Do not smoke or drink 24 hours before surgery  Prepare items to bring with you to the hospital  Shower with your chlorhexidine wash if prescribed  Brush your teeth and use your chlorhexidine dental rinse if prescribed    The Day of Surgery       Check  your [facility] medication instructions  Ensure you follow the instructions for when to stop eating and drinking  Shower, if prescribed use CHG.  Do not apply any lotions, creams, moisturizers, perfume or deodorant  Brush your teeth and use your CHG dental rinse if prescribed  Wear loose comfortable clothing  Avoid make-up  Remove  jewelry and piercings, consider professional piercing removal with a plastic spacer if needed  Bring photo ID and Insurance card  Bring an accurate medication list that includes medication dose, frequency and allergies  Bring a copy of your advanced directives (will, health care power of )  Bring any devices and controllers as well as medical devices you have been provided with for surgery (CPAP, slings, braces, etc.)  Dentures, eyeglasses, and contacts will be removed before surgery, please bring cases for contacts or glasses

## 2025-07-20 LAB — STAPHYLOCOCCUS SPEC CULT: NORMAL

## 2025-07-21 PROCEDURE — 93005 ELECTROCARDIOGRAM TRACING: CPT

## 2025-07-22 LAB
ATRIAL RATE: 76 BPM
P AXIS: 28 DEGREES
P OFFSET: 191 MS
P ONSET: 143 MS
PR INTERVAL: 152 MS
Q ONSET: 219 MS
QRS COUNT: 12 BEATS
QRS DURATION: 84 MS
QT INTERVAL: 394 MS
QTC CALCULATION(BAZETT): 443 MS
QTC FREDERICIA: 426 MS
R AXIS: -21 DEGREES
T AXIS: 53 DEGREES
T OFFSET: 416 MS
VENTRICULAR RATE: 76 BPM

## 2025-07-25 ENCOUNTER — ANESTHESIA EVENT (OUTPATIENT)
Dept: OPERATING ROOM | Facility: HOSPITAL | Age: 70
End: 2025-07-25
Payer: MEDICARE

## 2025-07-25 RX ORDER — CHOLECALCIFEROL (VITAMIN D3) 25 MCG
25 TABLET ORAL DAILY
Status: ON HOLD | COMMUNITY

## 2025-07-25 NOTE — PROGRESS NOTES
Pharmacy Medication History Review    Dayan Lopez is a 69 y.o. female who is planned to be admitted for Pancreatic mass ([facility]). Pharmacy called the patient prior to their scheduled procedure and reviewed the patient's [id number] medications for accuracy.    Medications ADDED:  Eye promise vitamin   Vitamin D 1000  Medications CHANGED:  Cetirizine from 5 mg to 10 mg   Medications REMOVED:   Lutein  Vitamin D 2000    Please review updated prior to admission medication list and comments regarding how patient may be taking medications differently by going to Admission tab --> Admission Orders --> Admit Orders / Review prior to admission medications.     Preferred pharmacy, last doses of medications, and allergies to be confirmed with patient by nursing the day of procedure.     Sources used to complete the med history [facility]:  [facility]  Pharmacy dispense history  Patient Interview Good historian  Chart Review  Care Everywhere     Below are additional concerns with the patient's PTA list.  None     Hugh Blackwell [facility]   Please reach out via Secure Chat for questions

## 2025-07-28 ENCOUNTER — ANESTHESIA (OUTPATIENT)
Dept: OPERATING ROOM | Facility: HOSPITAL | Age: 70
End: 2025-07-28
Payer: MEDICARE

## 2025-07-28 ENCOUNTER — HOSPITAL ENCOUNTER (INPATIENT)
Facility: HOSPITAL | Age: 70
LOS: 5 days | Discharge: NURSING, REHAB OR LONG-TERM CARE | End: 2025-08-02
Attending: SURGERY | Admitting: SURGERY
Payer: MEDICARE

## 2025-07-28 DIAGNOSIS — K86.89 PANCREATIC MASS (HHS-HCC): Primary | ICD-10-CM

## 2025-07-28 DIAGNOSIS — Z41.9 SURGERY, ELECTIVE: ICD-10-CM

## 2025-07-28 DIAGNOSIS — E03.9 HYPOTHYROIDISM (ACQUIRED): ICD-10-CM

## 2025-07-28 LAB
ABO GROUP (TYPE) IN BLOOD: NORMAL
ANION GAP BLDA CALCULATED.4IONS-SCNC: 7 MMO/L (ref 10–25)
BASE EXCESS BLDA CALC-SCNC: -2.6 MMOL/L (ref -2–3)
BODY TEMPERATURE: 37 DEGREES CELSIUS
CA-I BLDA-SCNC: 1.03 MMOL/L (ref 1.1–1.33)
CHLORIDE BLDA-SCNC: 113 MMOL/L (ref 98–107)
GLUCOSE BLD MANUAL STRIP-MCNC: 172 MG/DL (ref 74–99)
GLUCOSE BLD MANUAL STRIP-MCNC: 181 MG/DL (ref 74–99)
GLUCOSE BLDA-MCNC: 100 MG/DL (ref 74–99)
HCO3 BLDA-SCNC: 21.4 MMOL/L (ref 22–26)
HCT VFR BLD EST: 29 % (ref 36–46)
HGB BLDA-MCNC: 9.8 G/DL (ref 12–16)
INHALED O2 CONCENTRATION: 50 %
LACTATE BLDA-SCNC: 1.1 MMOL/L (ref 0.4–2)
OXYHGB MFR BLDA: 97.7 % (ref 94–98)
PCO2 BLDA: 33 MM HG (ref 38–42)
PH BLDA: 7.42 PH (ref 7.38–7.42)
PO2 BLDA: 130 MM HG (ref 85–95)
POTASSIUM BLDA-SCNC: 3.6 MMOL/L (ref 3.5–5.3)
RH FACTOR (ANTIGEN D): NORMAL
SAO2 % BLDA: 100 % (ref 94–100)
SODIUM BLDA-SCNC: 138 MMOL/L (ref 136–145)

## 2025-07-28 PROCEDURE — 2500000004 HC RX 250 GENERAL PHARMACY W/ HCPCS (ALT 636 FOR OP/ED)

## 2025-07-28 PROCEDURE — 88341 IMHCHEM/IMCYTCHM EA ADD ANTB: CPT | Performed by: STUDENT IN AN ORGANIZED HEALTH CARE EDUCATION/TRAINING PROGRAM

## 2025-07-28 PROCEDURE — 82947 ASSAY GLUCOSE BLOOD QUANT: CPT

## 2025-07-28 PROCEDURE — 3700000001 HC GENERAL ANESTHESIA TIME - INITIAL BASE CHARGE: Performed by: SURGERY

## 2025-07-28 PROCEDURE — 88342 IMHCHEM/IMCYTCHM 1ST ANTB: CPT | Performed by: STUDENT IN AN ORGANIZED HEALTH CARE EDUCATION/TRAINING PROGRAM

## 2025-07-28 PROCEDURE — 2500000004 HC RX 250 GENERAL PHARMACY W/ HCPCS (ALT 636 FOR OP/ED): Performed by: ANESTHESIOLOGIST ASSISTANT

## 2025-07-28 PROCEDURE — 48140 PARTIAL REMOVAL OF PANCREAS: CPT | Performed by: SURGERY

## 2025-07-28 PROCEDURE — A48140 PR PART REMV PANCREAS,DISTAL SUBTOTAL: Performed by: ANESTHESIOLOGIST ASSISTANT

## 2025-07-28 PROCEDURE — 88313 SPECIAL STAINS GROUP 2: CPT | Performed by: STUDENT IN AN ORGANIZED HEALTH CARE EDUCATION/TRAINING PROGRAM

## 2025-07-28 PROCEDURE — 1200000003 HC ONCOLOGY  ROOM WITH TELEMETRY DAILY

## 2025-07-28 PROCEDURE — 2500000002 HC RX 250 W HCPCS SELF ADMINISTERED DRUGS (ALT 637 FOR MEDICARE OP, ALT 636 FOR OP/ED): Performed by: STUDENT IN AN ORGANIZED HEALTH CARE EDUCATION/TRAINING PROGRAM

## 2025-07-28 PROCEDURE — 3600000009 HC OR TIME - EACH INCREMENTAL 1 MINUTE - PROCEDURE LEVEL FOUR: Performed by: SURGERY

## 2025-07-28 PROCEDURE — 3700000002 HC GENERAL ANESTHESIA TIME - EACH INCREMENTAL 1 MINUTE: Performed by: SURGERY

## 2025-07-28 PROCEDURE — 2500000004 HC RX 250 GENERAL PHARMACY W/ HCPCS (ALT 636 FOR OP/ED): Performed by: STUDENT IN AN ORGANIZED HEALTH CARE EDUCATION/TRAINING PROGRAM

## 2025-07-28 PROCEDURE — 07TP0ZZ RESECTION OF SPLEEN, OPEN APPROACH: ICD-10-PCS | Performed by: SURGERY

## 2025-07-28 PROCEDURE — 2500000004 HC RX 250 GENERAL PHARMACY W/ HCPCS (ALT 636 FOR OP/ED): Performed by: SURGERY

## 2025-07-28 PROCEDURE — 88307 TISSUE EXAM BY PATHOLOGIST: CPT | Mod: TC,SUR | Performed by: SURGERY

## 2025-07-28 PROCEDURE — 2720000007 HC OR 272 NO HCPCS: Performed by: SURGERY

## 2025-07-28 PROCEDURE — 84132 ASSAY OF SERUM POTASSIUM: CPT | Performed by: ANESTHESIOLOGIST ASSISTANT

## 2025-07-28 PROCEDURE — 7100000002 HC RECOVERY ROOM TIME - EACH INCREMENTAL 1 MINUTE: Performed by: SURGERY

## 2025-07-28 PROCEDURE — 88332 PATH CONSLTJ SURG EA ADD BLK: CPT | Performed by: STUDENT IN AN ORGANIZED HEALTH CARE EDUCATION/TRAINING PROGRAM

## 2025-07-28 PROCEDURE — 0FBG0ZZ EXCISION OF PANCREAS, OPEN APPROACH: ICD-10-PCS | Performed by: SURGERY

## 2025-07-28 PROCEDURE — 88307 TISSUE EXAM BY PATHOLOGIST: CPT | Performed by: STUDENT IN AN ORGANIZED HEALTH CARE EDUCATION/TRAINING PROGRAM

## 2025-07-28 PROCEDURE — 36415 COLL VENOUS BLD VENIPUNCTURE: CPT | Performed by: ANESTHESIOLOGY

## 2025-07-28 PROCEDURE — 7100000001 HC RECOVERY ROOM TIME - INITIAL BASE CHARGE: Performed by: SURGERY

## 2025-07-28 PROCEDURE — 0DBU0ZZ EXCISION OF OMENTUM, OPEN APPROACH: ICD-10-PCS | Performed by: SURGERY

## 2025-07-28 PROCEDURE — 88309 TISSUE EXAM BY PATHOLOGIST: CPT | Performed by: STUDENT IN AN ORGANIZED HEALTH CARE EDUCATION/TRAINING PROGRAM

## 2025-07-28 PROCEDURE — 3600000004 HC OR TIME - INITIAL BASE CHARGE - PROCEDURE LEVEL FOUR: Performed by: SURGERY

## 2025-07-28 PROCEDURE — 88331 PATH CONSLTJ SURG 1 BLK 1SPC: CPT | Mod: TC,SUR | Performed by: STUDENT IN AN ORGANIZED HEALTH CARE EDUCATION/TRAINING PROGRAM

## 2025-07-28 PROCEDURE — 36620 INSERTION CATHETER ARTERY: CPT | Performed by: ANESTHESIOLOGY

## 2025-07-28 PROCEDURE — A48140 PR PART REMV PANCREAS,DISTAL SUBTOTAL: Performed by: ANESTHESIOLOGY

## 2025-07-28 PROCEDURE — 2500000004 HC RX 250 GENERAL PHARMACY W/ HCPCS (ALT 636 FOR OP/ED): Mod: JZ,TB | Performed by: ANESTHESIOLOGY

## 2025-07-28 RX ORDER — HYDROMORPHONE HCL/0.9% NACL/PF 15 MG/30ML
PATIENT CONTROLLED ANALGESIA SYRINGE INTRAVENOUS CONTINUOUS
Status: DISCONTINUED | OUTPATIENT
Start: 2025-07-28 | End: 2025-07-30

## 2025-07-28 RX ORDER — PANTOPRAZOLE SODIUM 40 MG/10ML
20 INJECTION, POWDER, LYOPHILIZED, FOR SOLUTION INTRAVENOUS DAILY
Status: DISCONTINUED | OUTPATIENT
Start: 2025-07-29 | End: 2025-07-31

## 2025-07-28 RX ORDER — SUCCINYLCHOLINE CHLORIDE 20 MG/ML
INJECTION INTRAMUSCULAR; INTRAVENOUS AS NEEDED
Status: DISCONTINUED | OUTPATIENT
Start: 2025-07-28 | End: 2025-07-28

## 2025-07-28 RX ORDER — ACETAMINOPHEN 10 MG/ML
1000 INJECTION, SOLUTION INTRAVENOUS ONCE
Status: CANCELLED | OUTPATIENT
Start: 2025-07-28 | End: 2025-07-28

## 2025-07-28 RX ORDER — METOPROLOL SUCCINATE 25 MG/1
25 TABLET, EXTENDED RELEASE ORAL
Status: DISCONTINUED | OUTPATIENT
Start: 2025-07-28 | End: 2025-07-31

## 2025-07-28 RX ORDER — CETIRIZINE HYDROCHLORIDE 10 MG/1
10 TABLET ORAL DAILY
Status: DISCONTINUED | OUTPATIENT
Start: 2025-07-28 | End: 2025-08-02 | Stop reason: HOSPADM

## 2025-07-28 RX ORDER — PHENYLEPHRINE HCL IN 0.9% NACL 0.4MG/10ML
SYRINGE (ML) INTRAVENOUS AS NEEDED
Status: DISCONTINUED | OUTPATIENT
Start: 2025-07-28 | End: 2025-07-28

## 2025-07-28 RX ORDER — LIDOCAINE HCL/PF 100 MG/5ML
SYRINGE (ML) INTRAVENOUS AS NEEDED
Status: DISCONTINUED | OUTPATIENT
Start: 2025-07-28 | End: 2025-07-28

## 2025-07-28 RX ORDER — LIDOCAINE HYDROCHLORIDE 10 MG/ML
0.1 INJECTION, SOLUTION INFILTRATION; PERINEURAL ONCE
Status: DISCONTINUED | OUTPATIENT
Start: 2025-07-28 | End: 2025-07-28 | Stop reason: HOSPADM

## 2025-07-28 RX ORDER — ONDANSETRON HYDROCHLORIDE 2 MG/ML
INJECTION, SOLUTION INTRAVENOUS AS NEEDED
Status: DISCONTINUED | OUTPATIENT
Start: 2025-07-28 | End: 2025-07-28

## 2025-07-28 RX ORDER — SODIUM CHLORIDE, SODIUM LACTATE, POTASSIUM CHLORIDE, CALCIUM CHLORIDE 600; 310; 30; 20 MG/100ML; MG/100ML; MG/100ML; MG/100ML
100 INJECTION, SOLUTION INTRAVENOUS CONTINUOUS
Status: DISCONTINUED | OUTPATIENT
Start: 2025-07-28 | End: 2025-07-28 | Stop reason: HOSPADM

## 2025-07-28 RX ORDER — CEFOXITIN 2 G/1
INJECTION, POWDER, FOR SOLUTION INTRAVENOUS AS NEEDED
Status: DISCONTINUED | OUTPATIENT
Start: 2025-07-28 | End: 2025-07-28

## 2025-07-28 RX ORDER — SERTRALINE HYDROCHLORIDE 50 MG/1
50 TABLET, FILM COATED ORAL DAILY
Status: DISCONTINUED | OUTPATIENT
Start: 2025-07-29 | End: 2025-08-02 | Stop reason: HOSPADM

## 2025-07-28 RX ORDER — ROCURONIUM BROMIDE 10 MG/ML
INJECTION, SOLUTION INTRAVENOUS AS NEEDED
Status: DISCONTINUED | OUTPATIENT
Start: 2025-07-28 | End: 2025-07-28

## 2025-07-28 RX ORDER — ONDANSETRON HYDROCHLORIDE 2 MG/ML
4 INJECTION, SOLUTION INTRAVENOUS EVERY 8 HOURS PRN
Status: DISCONTINUED | OUTPATIENT
Start: 2025-07-28 | End: 2025-08-02 | Stop reason: HOSPADM

## 2025-07-28 RX ORDER — PROPOFOL 10 MG/ML
INJECTION, EMULSION INTRAVENOUS AS NEEDED
Status: DISCONTINUED | OUTPATIENT
Start: 2025-07-28 | End: 2025-07-28

## 2025-07-28 RX ORDER — ATORVASTATIN CALCIUM 10 MG/1
10 TABLET, FILM COATED ORAL DAILY
Status: DISCONTINUED | OUTPATIENT
Start: 2025-07-28 | End: 2025-08-02 | Stop reason: HOSPADM

## 2025-07-28 RX ORDER — GLYCOPYRROLATE 0.2 MG/ML
INJECTION INTRAMUSCULAR; INTRAVENOUS AS NEEDED
Status: DISCONTINUED | OUTPATIENT
Start: 2025-07-28 | End: 2025-07-28

## 2025-07-28 RX ORDER — INSULIN LISPRO 100 [IU]/ML
0-5 INJECTION, SOLUTION INTRAVENOUS; SUBCUTANEOUS EVERY 4 HOURS
Status: DISCONTINUED | OUTPATIENT
Start: 2025-07-28 | End: 2025-08-02 | Stop reason: HOSPADM

## 2025-07-28 RX ORDER — OXYCODONE HYDROCHLORIDE 5 MG/1
5 TABLET ORAL EVERY 4 HOURS PRN
Status: DISCONTINUED | OUTPATIENT
Start: 2025-07-28 | End: 2025-07-28 | Stop reason: HOSPADM

## 2025-07-28 RX ORDER — NALOXONE HYDROCHLORIDE 0.4 MG/ML
0.2 INJECTION, SOLUTION INTRAMUSCULAR; INTRAVENOUS; SUBCUTANEOUS AS NEEDED
Status: DISCONTINUED | OUTPATIENT
Start: 2025-07-28 | End: 2025-08-02 | Stop reason: HOSPADM

## 2025-07-28 RX ORDER — DEXTROSE 50 % IN WATER (D50W) INTRAVENOUS SYRINGE
12.5
Status: DISCONTINUED | OUTPATIENT
Start: 2025-07-28 | End: 2025-08-02 | Stop reason: HOSPADM

## 2025-07-28 RX ORDER — FENTANYL CITRATE 50 UG/ML
INJECTION, SOLUTION INTRAMUSCULAR; INTRAVENOUS AS NEEDED
Status: DISCONTINUED | OUTPATIENT
Start: 2025-07-28 | End: 2025-07-28

## 2025-07-28 RX ORDER — ROPIVACAINE HCL/PF 100MG/20ML
SYRINGE (ML) INJECTION AS NEEDED
Status: DISCONTINUED | OUTPATIENT
Start: 2025-07-28 | End: 2025-07-28

## 2025-07-28 RX ORDER — MIDAZOLAM HYDROCHLORIDE 2 MG/2ML
INJECTION, SOLUTION INTRAMUSCULAR; INTRAVENOUS AS NEEDED
Status: DISCONTINUED | OUTPATIENT
Start: 2025-07-28 | End: 2025-07-28

## 2025-07-28 RX ORDER — ONDANSETRON HYDROCHLORIDE 2 MG/ML
4 INJECTION, SOLUTION INTRAVENOUS ONCE
Status: COMPLETED | OUTPATIENT
Start: 2025-07-28 | End: 2025-07-28

## 2025-07-28 RX ORDER — METOPROLOL TARTRATE 1 MG/ML
5 INJECTION, SOLUTION INTRAVENOUS EVERY 6 HOURS
Status: DISCONTINUED | OUTPATIENT
Start: 2025-07-28 | End: 2025-07-31

## 2025-07-28 RX ORDER — DEXMEDETOMIDINE HYDROCHLORIDE 4 UG/ML
INJECTION, SOLUTION INTRAVENOUS CONTINUOUS PRN
Status: DISCONTINUED | OUTPATIENT
Start: 2025-07-28 | End: 2025-07-28

## 2025-07-28 RX ORDER — ROPIVACAINE IN 0.9% SOD CHL/PF 0.2 %
14 PLASTIC BAG, INJECTION (ML) EPIDURAL CONTINUOUS
Status: DISCONTINUED | OUTPATIENT
Start: 2025-07-28 | End: 2025-07-31

## 2025-07-28 RX ORDER — HEPARIN SODIUM 5000 [USP'U]/ML
5000 INJECTION, SOLUTION INTRAVENOUS; SUBCUTANEOUS ONCE
Status: COMPLETED | OUTPATIENT
Start: 2025-07-28 | End: 2025-07-28

## 2025-07-28 RX ORDER — HYDROMORPHONE HYDROCHLORIDE 0.2 MG/ML
0.2 INJECTION INTRAMUSCULAR; INTRAVENOUS; SUBCUTANEOUS EVERY 5 MIN PRN
Status: DISCONTINUED | OUTPATIENT
Start: 2025-07-28 | End: 2025-07-28 | Stop reason: HOSPADM

## 2025-07-28 RX ORDER — ONDANSETRON 4 MG/1
4 TABLET, FILM COATED ORAL ONCE
Status: COMPLETED | OUTPATIENT
Start: 2025-07-28 | End: 2025-07-28

## 2025-07-28 RX ORDER — OXYCODONE HYDROCHLORIDE 5 MG/1
10 TABLET ORAL EVERY 4 HOURS PRN
Status: DISCONTINUED | OUTPATIENT
Start: 2025-07-28 | End: 2025-07-28 | Stop reason: HOSPADM

## 2025-07-28 RX ORDER — HYDROMORPHONE HYDROCHLORIDE 1 MG/ML
INJECTION, SOLUTION INTRAMUSCULAR; INTRAVENOUS; SUBCUTANEOUS AS NEEDED
Status: DISCONTINUED | OUTPATIENT
Start: 2025-07-28 | End: 2025-07-28

## 2025-07-28 RX ORDER — ONDANSETRON 4 MG/1
4 TABLET, ORALLY DISINTEGRATING ORAL EVERY 8 HOURS PRN
Status: DISCONTINUED | OUTPATIENT
Start: 2025-07-28 | End: 2025-08-02 | Stop reason: HOSPADM

## 2025-07-28 RX ORDER — ENOXAPARIN SODIUM 100 MG/ML
40 INJECTION SUBCUTANEOUS EVERY 24 HOURS
Status: DISCONTINUED | OUTPATIENT
Start: 2025-07-29 | End: 2025-08-02 | Stop reason: HOSPADM

## 2025-07-28 RX ORDER — DEXTROSE 50 % IN WATER (D50W) INTRAVENOUS SYRINGE
25
Status: DISCONTINUED | OUTPATIENT
Start: 2025-07-28 | End: 2025-08-02 | Stop reason: HOSPADM

## 2025-07-28 RX ORDER — ONDANSETRON HYDROCHLORIDE 2 MG/ML
4 INJECTION, SOLUTION INTRAVENOUS ONCE AS NEEDED
Status: DISCONTINUED | OUTPATIENT
Start: 2025-07-28 | End: 2025-07-28 | Stop reason: HOSPADM

## 2025-07-28 RX ORDER — SODIUM CHLORIDE, SODIUM LACTATE, POTASSIUM CHLORIDE, CALCIUM CHLORIDE 600; 310; 30; 20 MG/100ML; MG/100ML; MG/100ML; MG/100ML
100 INJECTION, SOLUTION INTRAVENOUS CONTINUOUS
Status: DISCONTINUED | OUTPATIENT
Start: 2025-07-28 | End: 2025-07-29

## 2025-07-28 RX ORDER — LEVOTHYROXINE SODIUM ANHYDROUS 100 UG/5ML
12.5 INJECTION, POWDER, LYOPHILIZED, FOR SOLUTION INTRAVENOUS
Status: DISCONTINUED | OUTPATIENT
Start: 2025-07-29 | End: 2025-07-29

## 2025-07-28 RX ORDER — ACETAMINOPHEN 10 MG/ML
1000 INJECTION, SOLUTION INTRAVENOUS EVERY 6 HOURS
Status: DISPENSED | OUTPATIENT
Start: 2025-07-28 | End: 2025-07-30

## 2025-07-28 RX ORDER — CALCIUM CHLORIDE INJECTION 100 MG/ML
INJECTION, SOLUTION INTRAVENOUS AS NEEDED
Status: DISCONTINUED | OUTPATIENT
Start: 2025-07-28 | End: 2025-07-28

## 2025-07-28 RX ADMIN — PROPOFOL 150 MG: 10 INJECTION, EMULSION INTRAVENOUS at 10:15

## 2025-07-28 RX ADMIN — CALCIUM CHLORIDE 0.5 G: 100 INJECTION, SOLUTION INTRAVENOUS at 11:45

## 2025-07-28 RX ADMIN — Medication 80 MCG: at 12:00

## 2025-07-28 RX ADMIN — Medication 80 MCG: at 10:30

## 2025-07-28 RX ADMIN — SUGAMMADEX 200 MG: 100 INJECTION, SOLUTION INTRAVENOUS at 13:14

## 2025-07-28 RX ADMIN — Medication: at 14:38

## 2025-07-28 RX ADMIN — ONDANSETRON 4 MG: 2 INJECTION, SOLUTION INTRAMUSCULAR; INTRAVENOUS at 13:10

## 2025-07-28 RX ADMIN — HYDROMORPHONE HYDROCHLORIDE 0.5 MG: 1 INJECTION, SOLUTION INTRAMUSCULAR; INTRAVENOUS; SUBCUTANEOUS at 12:15

## 2025-07-28 RX ADMIN — Medication 30 ML: at 09:27

## 2025-07-28 RX ADMIN — SODIUM CHLORIDE, SODIUM LACTATE, POTASSIUM CHLORIDE, AND CALCIUM CHLORIDE 100 ML/HR: .6; .31; .03; .02 INJECTION, SOLUTION INTRAVENOUS at 18:32

## 2025-07-28 RX ADMIN — HYDROMORPHONE HYDROCHLORIDE 0.5 MG: 1 INJECTION, SOLUTION INTRAMUSCULAR; INTRAVENOUS; SUBCUTANEOUS at 13:32

## 2025-07-28 RX ADMIN — SODIUM CHLORIDE, SODIUM LACTATE, POTASSIUM CHLORIDE, AND CALCIUM CHLORIDE: 600; 310; 30; 20 INJECTION, SOLUTION INTRAVENOUS at 10:06

## 2025-07-28 RX ADMIN — DEXMEDETOMIDINE HYDROCHLORIDE 0.3 MCG/KG/HR: 4 INJECTION, SOLUTION INTRAVENOUS at 11:15

## 2025-07-28 RX ADMIN — HYDROMORPHONE HYDROCHLORIDE 0.5 MG: 1 INJECTION, SOLUTION INTRAMUSCULAR; INTRAVENOUS; SUBCUTANEOUS at 13:15

## 2025-07-28 RX ADMIN — ROCURONIUM BROMIDE 20 MG: 10 INJECTION, SOLUTION INTRAVENOUS at 11:01

## 2025-07-28 RX ADMIN — HEPARIN SODIUM 5000 UNITS: 5000 INJECTION INTRAVENOUS; SUBCUTANEOUS at 10:04

## 2025-07-28 RX ADMIN — ROCURONIUM BROMIDE 20 MG: 10 INJECTION, SOLUTION INTRAVENOUS at 11:53

## 2025-07-28 RX ADMIN — LIDOCAINE HYDROCHLORIDE 80 MG: 20 INJECTION, SOLUTION INTRAVENOUS at 10:15

## 2025-07-28 RX ADMIN — HYDROMORPHONE HYDROCHLORIDE 0.5 MG: 1 INJECTION, SOLUTION INTRAMUSCULAR; INTRAVENOUS; SUBCUTANEOUS at 14:14

## 2025-07-28 RX ADMIN — SUCCINYLCHOLINE CHLORIDE 160 MG: 20 INJECTION, SOLUTION INTRAMUSCULAR; INTRAVENOUS at 10:15

## 2025-07-28 RX ADMIN — MIDAZOLAM HYDROCHLORIDE 1 MG: 2 INJECTION, SOLUTION INTRAMUSCULAR; INTRAVENOUS at 09:18

## 2025-07-28 RX ADMIN — ACETAMINOPHEN 1000 MG: 10 INJECTION INTRAVENOUS at 18:41

## 2025-07-28 RX ADMIN — MIDAZOLAM HYDROCHLORIDE 1 MG: 2 INJECTION, SOLUTION INTRAMUSCULAR; INTRAVENOUS at 10:06

## 2025-07-28 RX ADMIN — FENTANYL CITRATE 50 MCG: 50 INJECTION, SOLUTION INTRAMUSCULAR; INTRAVENOUS at 10:15

## 2025-07-28 RX ADMIN — DEXAMETHASONE SODIUM PHOSPHATE 4 MG: 4 INJECTION INTRA-ARTICULAR; INTRALESIONAL; INTRAMUSCULAR; INTRAVENOUS; SOFT TISSUE at 11:14

## 2025-07-28 RX ADMIN — Medication: at 20:22

## 2025-07-28 RX ADMIN — CEFOXITIN SODIUM 2 G: 2 POWDER, FOR SOLUTION INTRAVENOUS at 10:50

## 2025-07-28 RX ADMIN — FENTANYL CITRATE 50 MCG: 50 INJECTION, SOLUTION INTRAMUSCULAR; INTRAVENOUS at 11:01

## 2025-07-28 RX ADMIN — ONDANSETRON 4 MG: 2 INJECTION INTRAMUSCULAR; INTRAVENOUS at 20:00

## 2025-07-28 RX ADMIN — Medication 80 MCG: at 13:06

## 2025-07-28 RX ADMIN — ROCURONIUM BROMIDE 30 MG: 10 INJECTION, SOLUTION INTRAVENOUS at 10:35

## 2025-07-28 RX ADMIN — INSULIN LISPRO 1 UNITS: 100 INJECTION, SOLUTION INTRAVENOUS; SUBCUTANEOUS at 18:40

## 2025-07-28 RX ADMIN — SODIUM CHLORIDE, SODIUM LACTATE, POTASSIUM CHLORIDE, AND CALCIUM CHLORIDE 100 ML/HR: .6; .31; .03; .02 INJECTION, SOLUTION INTRAVENOUS at 20:32

## 2025-07-28 RX ADMIN — Medication 80 MCG: at 10:35

## 2025-07-28 RX ADMIN — Medication 80 MCG: at 11:24

## 2025-07-28 RX ADMIN — ROCURONIUM BROMIDE 10 MG: 10 INJECTION, SOLUTION INTRAVENOUS at 11:37

## 2025-07-28 RX ADMIN — GLYCOPYRROLATE 0.2 MG: 0.2 SOLUTION INTRAMUSCULAR; INTRAVENOUS at 11:24

## 2025-07-28 RX ADMIN — HYDROMORPHONE HYDROCHLORIDE 0.5 MG: 1 INJECTION, SOLUTION INTRAMUSCULAR; INTRAVENOUS; SUBCUTANEOUS at 13:56

## 2025-07-28 SDOH — ECONOMIC STABILITY: FOOD INSECURITY: WITHIN THE PAST 12 MONTHS, THE FOOD YOU BOUGHT JUST DIDN'T LAST AND YOU DIDN'T HAVE MONEY TO GET MORE.: NEVER TRUE

## 2025-07-28 SDOH — SOCIAL STABILITY: SOCIAL INSECURITY: HAVE YOU HAD ANY THOUGHTS OF HARMING ANYONE ELSE?: NO

## 2025-07-28 SDOH — HEALTH STABILITY: MENTAL HEALTH: HOW OFTEN DO YOU HAVE A DRINK CONTAINING ALCOHOL?: NEVER

## 2025-07-28 SDOH — SOCIAL STABILITY: SOCIAL INSECURITY
ASK PARENT OR GUARDIAN: ARE THERE TIMES WHEN YOU, YOUR CHILD(REN), OR ANY MEMBER OF YOUR HOUSEHOLD FEEL UNSAFE, HARMED, OR THREATENED AROUND PERSONS WITH WHOM YOU KNOW OR LIVE?: NO

## 2025-07-28 SDOH — HEALTH STABILITY: PHYSICAL HEALTH
HOW OFTEN DO YOU NEED TO HAVE SOMEONE HELP YOU WHEN YOU READ INSTRUCTIONS, PAMPHLETS, OR OTHER WRITTEN MATERIAL FROM YOUR DOCTOR OR PHARMACY?: RARELY

## 2025-07-28 SDOH — SOCIAL STABILITY: SOCIAL INSECURITY: WITHIN THE LAST YEAR, HAVE YOU BEEN AFRAID OF YOUR PARTNER OR EX-PARTNER?: NO

## 2025-07-28 SDOH — SOCIAL STABILITY: SOCIAL INSECURITY
WITHIN THE LAST YEAR, HAVE YOU BEEN RAPED OR FORCED TO HAVE ANY KIND OF SEXUAL ACTIVITY BY YOUR PARTNER OR EX-PARTNER?: NO

## 2025-07-28 SDOH — ECONOMIC STABILITY: FOOD INSECURITY: WITHIN THE PAST 12 MONTHS, YOU WORRIED THAT YOUR FOOD WOULD RUN OUT BEFORE YOU GOT THE MONEY TO BUY MORE.: NEVER TRUE

## 2025-07-28 SDOH — SOCIAL STABILITY: SOCIAL INSECURITY: ABUSE: ADULT

## 2025-07-28 SDOH — SOCIAL STABILITY: SOCIAL INSECURITY: WITHIN THE LAST YEAR, HAVE YOU BEEN HUMILIATED OR EMOTIONALLY ABUSED IN OTHER WAYS BY YOUR PARTNER OR EX-PARTNER?: NO

## 2025-07-28 SDOH — ECONOMIC STABILITY: HOUSING INSECURITY: IN THE PAST 12 MONTHS, HOW MANY TIMES HAVE YOU MOVED WHERE YOU WERE LIVING?: 0

## 2025-07-28 SDOH — ECONOMIC STABILITY: FOOD INSECURITY: HOW HARD IS IT FOR YOU TO PAY FOR THE VERY BASICS LIKE FOOD, HOUSING, MEDICAL CARE, AND HEATING?: NOT HARD AT ALL

## 2025-07-28 SDOH — SOCIAL STABILITY: SOCIAL INSECURITY: DOES ANYONE TRY TO KEEP YOU FROM HAVING/CONTACTING OTHER FRIENDS OR DOING THINGS OUTSIDE YOUR HOME?: NO

## 2025-07-28 SDOH — HEALTH STABILITY: MENTAL HEALTH
DO YOU FEEL STRESS - TENSE, RESTLESS, NERVOUS, OR ANXIOUS, OR UNABLE TO SLEEP AT NIGHT BECAUSE YOUR MIND IS TROUBLED ALL THE TIME - THESE DAYS?: TO SOME EXTENT

## 2025-07-28 SDOH — HEALTH STABILITY: MENTAL HEALTH: HOW MANY DRINKS CONTAINING ALCOHOL DO YOU HAVE ON A TYPICAL DAY WHEN YOU ARE DRINKING?: PATIENT DOES NOT DRINK

## 2025-07-28 SDOH — SOCIAL STABILITY: SOCIAL INSECURITY: WERE YOU ABLE TO COMPLETE ALL THE BEHAVIORAL HEALTH SCREENINGS?: YES

## 2025-07-28 SDOH — HEALTH STABILITY: MENTAL HEALTH: HOW OFTEN DO YOU HAVE SIX OR MORE DRINKS ON ONE OCCASION?: NEVER

## 2025-07-28 SDOH — SOCIAL STABILITY: SOCIAL INSECURITY: HAS ANYONE EVER THREATENED TO HURT YOUR FAMILY OR YOUR PETS?: NO

## 2025-07-28 SDOH — SOCIAL STABILITY: SOCIAL INSECURITY
WITHIN THE LAST YEAR, HAVE YOU BEEN KICKED, HIT, SLAPPED, OR OTHERWISE PHYSICALLY HURT BY YOUR PARTNER OR EX-PARTNER?: NO

## 2025-07-28 SDOH — ECONOMIC STABILITY: INCOME INSECURITY: IN THE PAST 12 MONTHS HAS THE ELECTRIC, GAS, OIL, OR WATER COMPANY THREATENED TO SHUT OFF SERVICES IN YOUR HOME?: NO

## 2025-07-28 SDOH — SOCIAL STABILITY: SOCIAL INSECURITY: ARE YOU OR HAVE YOU BEEN THREATENED OR ABUSED PHYSICALLY, EMOTIONALLY, OR SEXUALLY BY ANYONE?: NO

## 2025-07-28 SDOH — ECONOMIC STABILITY: HOUSING INSECURITY: AT ANY TIME IN THE PAST 12 MONTHS, WERE YOU HOMELESS OR LIVING IN A SHELTER (INCLUDING NOW)?: NO

## 2025-07-28 SDOH — ECONOMIC STABILITY: HOUSING INSECURITY: IN THE LAST 12 MONTHS, WAS THERE A TIME WHEN YOU WERE NOT ABLE TO PAY THE MORTGAGE OR RENT ON TIME?: NO

## 2025-07-28 SDOH — HEALTH STABILITY: PHYSICAL HEALTH: ON AVERAGE, HOW MANY MINUTES DO YOU ENGAGE IN EXERCISE AT THIS LEVEL?: 0 MIN

## 2025-07-28 SDOH — HEALTH STABILITY: MENTAL HEALTH: CURRENT SMOKER: 0

## 2025-07-28 SDOH — ECONOMIC STABILITY: TRANSPORTATION INSECURITY: IN THE PAST 12 MONTHS, HAS LACK OF TRANSPORTATION KEPT YOU FROM MEDICAL APPOINTMENTS OR FROM GETTING MEDICATIONS?: NO

## 2025-07-28 SDOH — HEALTH STABILITY: PHYSICAL HEALTH: ON AVERAGE, HOW MANY DAYS PER WEEK DO YOU ENGAGE IN MODERATE TO STRENUOUS EXERCISE (LIKE A BRISK WALK)?: 0 DAYS

## 2025-07-28 SDOH — SOCIAL STABILITY: SOCIAL INSECURITY: HAVE YOU HAD THOUGHTS OF HARMING ANYONE ELSE?: NO

## 2025-07-28 SDOH — SOCIAL STABILITY: SOCIAL INSECURITY: DO YOU FEEL UNSAFE GOING BACK TO THE PLACE WHERE YOU ARE LIVING?: NO

## 2025-07-28 SDOH — SOCIAL STABILITY: SOCIAL INSECURITY: DO YOU FEEL ANYONE HAS EXPLOITED OR TAKEN ADVANTAGE OF YOU FINANCIALLY OR OF YOUR PERSONAL PROPERTY?: NO

## 2025-07-28 SDOH — SOCIAL STABILITY: SOCIAL INSECURITY: ARE THERE ANY APPARENT SIGNS OF INJURIES/BEHAVIORS THAT COULD BE RELATED TO ABUSE/NEGLECT?: NO

## 2025-07-28 ASSESSMENT — PATIENT HEALTH QUESTIONNAIRE - PHQ9
2. FEELING DOWN, DEPRESSED OR HOPELESS: NOT AT ALL
SUM OF ALL RESPONSES TO PHQ9 QUESTIONS 1 & 2: 0
1. LITTLE INTEREST OR PLEASURE IN DOING THINGS: NOT AT ALL

## 2025-07-28 ASSESSMENT — PAIN - FUNCTIONAL ASSESSMENT
PAIN_FUNCTIONAL_ASSESSMENT: 0-10
PAIN_FUNCTIONAL_ASSESSMENT: UNABLE TO SELF-REPORT
PAIN_FUNCTIONAL_ASSESSMENT: 0-10
PAIN_FUNCTIONAL_ASSESSMENT: UNABLE TO SELF-REPORT
PAIN_FUNCTIONAL_ASSESSMENT: 0-10
PAIN_FUNCTIONAL_ASSESSMENT: 0-10
PAIN_FUNCTIONAL_ASSESSMENT: UNABLE TO SELF-REPORT
PAIN_FUNCTIONAL_ASSESSMENT: 0-10
PAIN_FUNCTIONAL_ASSESSMENT: CPOT (CRITICAL CARE PAIN OBSERVATION TOOL)
PAIN_FUNCTIONAL_ASSESSMENT: CPOT (CRITICAL CARE PAIN OBSERVATION TOOL)
PAIN_FUNCTIONAL_ASSESSMENT: UNABLE TO SELF-REPORT

## 2025-07-28 ASSESSMENT — PAIN SCALES - GENERAL
PAINLEVEL_OUTOF10: 10 - WORST POSSIBLE PAIN
PAINLEVEL_OUTOF10: 6
PAINLEVEL_OUTOF10: 0 - NO PAIN
PAINLEVEL_OUTOF10: 8

## 2025-07-28 ASSESSMENT — LIFESTYLE VARIABLES
HOW OFTEN DO YOU HAVE A DRINK CONTAINING ALCOHOL: NEVER
AUDIT-C TOTAL SCORE: 0
HOW MANY STANDARD DRINKS CONTAINING ALCOHOL DO YOU HAVE ON A TYPICAL DAY: PATIENT DOES NOT DRINK
AUDIT-C TOTAL SCORE: 0
HOW OFTEN DO YOU HAVE 6 OR MORE DRINKS ON ONE OCCASION: NEVER
AUDIT-C TOTAL SCORE: 0
SKIP TO QUESTIONS 9-10: 1
SKIP TO QUESTIONS 9-10: 1

## 2025-07-28 ASSESSMENT — ACTIVITIES OF DAILY LIVING (ADL)
FEEDING YOURSELF: INDEPENDENT
WALKS IN HOME: INDEPENDENT
DRESSING YOURSELF: INDEPENDENT
HEARING - LEFT EAR: FUNCTIONAL
JUDGMENT_ADEQUATE_SAFELY_COMPLETE_DAILY_ACTIVITIES: YES
ADEQUATE_TO_COMPLETE_ADL: YES
LACK_OF_TRANSPORTATION: NO
TOILETING: INDEPENDENT
BATHING: INDEPENDENT
HEARING - RIGHT EAR: FUNCTIONAL
GROOMING: INDEPENDENT
PATIENT'S MEMORY ADEQUATE TO SAFELY COMPLETE DAILY ACTIVITIES?: NO

## 2025-07-28 ASSESSMENT — COGNITIVE AND FUNCTIONAL STATUS - GENERAL
MOBILITY SCORE: 24
PATIENT BASELINE BEDBOUND: NO
DAILY ACTIVITIY SCORE: 24
PATIENT BASELINE BEDBOUND: NO

## 2025-07-28 ASSESSMENT — COLUMBIA-SUICIDE SEVERITY RATING SCALE - C-SSRS
1. IN THE PAST MONTH, HAVE YOU WISHED YOU WERE DEAD OR WISHED YOU COULD GO TO SLEEP AND NOT WAKE UP?: NO
6. HAVE YOU EVER DONE ANYTHING, STARTED TO DO ANYTHING, OR PREPARED TO DO ANYTHING TO END YOUR LIFE?: NO
2. HAVE YOU ACTUALLY HAD ANY THOUGHTS OF KILLING YOURSELF?: NO

## 2025-07-28 NOTE — ADDENDUM NOTE
Addendum  created 07/28/25 1410 by Cirilo Nick, DO    Clinical Note Signed, Intraprocedure Blocks edited

## 2025-07-28 NOTE — ANESTHESIA PROCEDURE NOTES
Peripheral Block    Patient location during procedure: pre-op  Medication administered at: 7/28/2025 9:23 AM  End time: 7/28/2025 9:45 AM  Reason for block: at surgeon's request, post-op pain management and acute pain service  Staffing  Performed: resident and attending   Authorized by: Pearl Campa MD    Performed by: Cirilo Nick DO  Preanesthetic Checklist  Completed: patient identified, IV checked, site marked, risks and benefits discussed, surgical consent, monitors and equipment checked, pre-op evaluation and timeout performed   Timeout performed at: 7/28/2025 9:24 AM  Peripheral Block  Patient position: sitting  Prep: ChloraPrep  Patient monitoring: heart rate and continuous pulse ox  Block type: KANDI  Laterality: B/L  Injection technique: catheter  Guidance: ultrasound guided  Local infiltration: lidocaine  Infiltration strength: 1 %  Dose: 3 mL  Needle  Needle type: Tuohy   Needle gauge: 22 G  Needle length: 8 cm  Needle localization: ultrasound guidance       image stored in chart  Assessment  Injection assessment: negative aspiration for heme, no paresthesia on injection, incremental injection and local visualized surrounding nerve on ultrasound  Heart rate change: no  Additional Notes  Erector spinae plane block:     Prior to procedure: Following a focused history, procedure-related and patient-specific complications were discussed. Risks, benefits, and alternatives were explained. Informed, written consent was provided by the patient and/or surrogate decision maker for the block. Anticoagulation (if any) was held per CAMILA guidelines. ASA monitors were applied. Patient was positioned, prepped with chlorhexidine, and draped with sterile towels.     Ultrasound guidance was used to visualize the erector spinae muscle above the TP/costal junction at T7. Skin was numbed with 1% lidocaine. Needle was inserted and advanced towards target with visualization of the needle throughout duration of the  procedure. A total of 30 cc of 0.5% ropivacaine, was divided and injected bilaterally. Catheters threaded and secured. Patient tolerated procedure well.    Timeout by Alejandra MAYER

## 2025-07-28 NOTE — SIGNIFICANT EVENT
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 Patient doing well postoperatively. Pain moderately controlled. Denies nausea, vomiting, SOB, chest pain, fevers, chills. Resting comfortably in bed.     O:   Visit Vitals  /58 (BP Location: Right arm, Patient Position: Lying)   Pulse 59   Temp 37.1 °C (98.8 °F) (Temporal)   Resp 16        PHYSICAL EXAM  Constitutional: no acute distress  Skin: warm and dry  Neuro: alert and conversant  Cardiac: non-cyanotic  Pulmonary: non-labored breathing on NC  Abdomen: soft, appropriately tender to palpation, minimal distension. Midline incision covered with 4x4 and tegaderm with minimal strikethrough at the inferior aspect of the incision. Bilateral drains with minimal serosanguinous output   : banks in place draining CYU    A/P:  - multimodal pain control: IV tylenol, ropivacaine blocks in place, PCA available  - metoprolol 5mg IV q6h with hold parameters   - continuous pulse ox, telemetry  - NPO sips with meds  - LR 100cc/hr  - PPI daily  - PRN zofran for N/V  - SSI #1 q4h with hypoglycemia protocol  - plan for AM labs (or sooner if clinically indicated)  - plan for PT/OT 7/29  - DVT ppx: SCDs, received preoperative SQH, plan for lovenox 7/29 pending AM CBC    Ele Parker MD  PGY-2  Surgical Oncology  Service Pager: [phone]

## 2025-07-28 NOTE — OP NOTE
Distal Pancreatectomy / Splenectomy Operative Note     Date: 2025  OR Location: [facility] OR    Name: Dayan Lopez, : 1955, Age: 69 y.o., MRN: [id number], Sex: female    Diagnosis  Pre-op Diagnosis      * Pancreatic mass ([facility]-HCC) [K86.89] Post-op Diagnosis     * Pancreatic mass ([facility]-HCC) [K86.89]     Procedures  Distal Pancreatectomy / Splenectomy  58062 - GA PNCRTECT DSTL STOT W/O PNCRTCOJEJUNOSTOMY      Surgeons      * Jay Fong - Primary    Resident/Fellow/Other Assistant:  Surgeons and Role:     * Kim Jewell MD - Resident - Assisting     * Ele Parker MD - Resident - Assisting    Staff:   Circulator: Luciano Walker Person: Maurice Tavarezub Person: Nilda Walker Person: Yaw Torres Scrub: Ivan  Relief Circulator: Ivan    Anesthesia Staff: Anesthesiologist: Luke Pineda MD  C-AA: CHANO Hassan; CHANO Del Rio    Procedure Summary  Anesthesia: General  ASA: II  Estimated Blood Loss:  25 mL  Intra-op Medications:   Administrations occurring from 0940 to 1440 on 25:   Medication Name Total Dose   calcium chloride 10% 0.5 g   cefOXitin (Mefoxin) vial 2 g 2 g   dexAMETHasone (Decadron) 4 mg/mL 4 mg   dexmedeTOMIDine 4 mcg/mL in 100 mL NS infusion 45.48 mcg   dexmedeTOMIDine (Precedex) bolus from bag 42.5 mcg   fentaNYL (Sublimaze) injection 50 mcg/mL 100 mcg   glycopyrrolate (Robinul) injection 0.2 mg   HYDROmorphone (Dilaudid) injection 1 mg/mL 0.5 mg   LR bolus Cannot be calculated   LR bolus Cannot be calculated   lidocaine (cardiac) injection 2% prefilled syringe 80 mg   midazolam PF (Versed) injection 1 mg/mL 1 mg   ondansetron (Zofran) 2 mg/mL injection 4 mg   phenylephrine 40 mcg/mL syringe 10 mL 400 mcg   propofol (Diprivan) injection 10 mg/mL 150 mg   rocuronium (ZeMuron) 50 mg/5 mL injection 80 mg   succinylcholine (Anectine) 20 mg/mL injection 160 mg   sugammadex (Bridion) 200 mg/2 mL injection 200 mg   heparin (porcine) injection 5,000  Units 5,000 Units              Anesthesia Record               Intraprocedure I/O Totals          Intake    Dexmedetomidine 0.00 mL    The total shown is the total volume documented since Anesthesia Start was filed.    LR bolus 1000.00 mL    Total Intake 1000 mL       Output    Urine 430 mL    Est. Blood Loss 25 mL    Total Output 455 mL       Net    Net Volume 545 mL          Specimen:   ID Type Source Tests Collected by Time   1 : OMENTUM WITH SPLENIAL Tissue OMENTUM RESECTION SURGICAL PATHOLOGY EXAM Jay Fong MD 7/28/2025 1151   2 : DISTAL PANCREAS AND SPLEEN Tissue PANCREAS RESECTION, DISTAL (TAIL) SURGICAL PATHOLOGY EXAM Jay Fong MD 7/28/2025 1157      Drains and/or Catheters:   Urethral Catheter Non-latex;Straight-tip 16 Fr. (Active)   Two 19 Fr TRIPP Drains, standard position    Findings:  Firm TOP, no obvious mass.  Transection margin - for HGD or CA    Indications: Dayan Lopez is an 69 y.o. female who is having surgery for Pancreatic mass ([facility]) [K86.89].      The patient was seen in the preoperative area. The risks, benefits, complications, treatment options, non-operative alternatives, expected recovery and outcomes were discussed with the patient. The possibilities of reaction to medication, pulmonary aspiration, injury to surrounding structures, bleeding, recurrent infection, the need for additional procedures, failure to diagnose a condition, and creating a complication requiring transfusion or operation were discussed with the patient. The patient concurred with the proposed plan, giving informed consent.  The site of surgery was properly noted/marked if necessary per policy. The patient has been actively warmed in preoperative area. Preoperative antibiotics have been ordered and given within 1 hours of incision. Venous thrombosis prophylaxis have been ordered including bilateral sequential compression devices and chemical prophylaxis    Procedure Details:     This patient  is 69 years old.  She has a worrisome pancreatic tail mass based on cross-sectional imaging, pet imaging, and endoscopic evaluation.  The full details are outlined in my dictated clinic note.  She is brought to the operating room today for distal pancreatectomy and splenectomy.    The patient was brought to the operating room and placed on the operating table in supine position.  A timeout was performed.  After the induction of general endotracheal anesthesia, she was given intravenous antibiotics.  She had a Hudson catheter placed.  She has sequential compression devices placed.  She had been given subcutaneous heparin.  Her abdomen was prepped and draped in standard sterile fashion.    We made a midline incision from xiphoid down just below umbilicus.  We carried the dissection down through the subcutaneous tissues with the electrocautery opening up the fascia the length of the incision.  We inserted the Bookwalter for retraction.  An abdominal exploration revealed no evidence of palpable or visible liver lesions.  There was no ascites.  There were no peritoneal lesions.  We ran the small bowel from the ligament of Treitz to the cecum and found no intestinal lesions.  In particular there was nothing at the terminal ileum/ileocecal valve area.    We began the operation by gaining access to the lesser sac going through the gastrocolic omentum.  We did this from the distal aspect of the greater curvature of the stomach all the way up to the angle of Hiss.  There was a small hiatal hernia defect.  With all of these short gastrics taken we were able to reflect this patient's stomach cranially and to the right.  We had to lyse a few adhesions between the posterior aspect of her her stomach and her pancreatic tail, likely from the endoscopic biopsy.    The pancreatic tail was firm there was not an obvious discrete mass seen on the capsular surface.  What we did was to dissect out the splenic artery as it came off the  celiac trunk.  We placed a vessel loop around it.  We then took our dissection out to the superior pole of the spleen keeping all of the lymphatic tissue with the specimen.    We next worked along the inferior border of the pancreas to the inferior pole of the spleen getting the splenic flexure of the colon down out of the operative field.  Where we had dissected out the splenic artery along the superior border of the pancreas was well to the right radiographically of any area of concern and where there was normal pancreas.  We therefore took the splenic artery with a single firing of the Endo SELVIN stapler containing a white vascular load.  This was done in essence just left of the portal vein and superior mesenteric vein.    We next rotated the spleen, pancreatic tail, and pancreatic body up out of the retroperitoneum.  We did this to the point where we had transected the artery.  We then dissected out the splenic vein at that level and controlled it with a single firing of the Endo SELVIN stapler containing a gray extra thin vascular load.  Finally we transected the pancreatic parenchyma using a single firing of the Endo SELVIN stapler with a gold load.  This was a slow purposeful compression of the parenchyma and then a slow firing of the stapler over the course of 10 to 12 minutes.    There was pretty good capsular apposition anteriorly and posteriorly but there was still a little bit of ooze at the staple line.  I therefore oversewed the staple line on the pancreatic remnant with real silk figure-of-eight suture.    We sent the specimen to pathology where a frozen section on the transection staple line showed no evidence of malignancy or high-grade dysplasia.    We irrigated the left upper quadrant.  There was excellent hemostasis.  We placed two 19 Turkish TRIPP drains.  Both were secured at the skin level with two 2-0 Prolene's.  1 from the right and 1 from the left.  The left went up to the subdiaphragmatic space back  toward the remnant.  The right went over the transverse colon behind the stomach running by the remnant.    We performed a final sweep and make sure that everything was accounted for.  We closed the fascia with running looped #1 PDS.  We irrigated out the subcutaneous tissue.  The incision was then closed with interrupted 3-0 Vicryl subdermal's followed by staples in my standard dressing.    At the time of my departure from the operating room the plan was for extubation and transport to the recovery room.    This note has been dictated with voice recognition software and has not been reviewed for grammar or content errors.        Evidence of Infection: No   Complications:  None; patient tolerated the procedure well.    Disposition: PACU - hemodynamically stable.  Condition: stable     Attending Attestation: I was present and scrubbed for the entire procedure.    Jay Fong  Phone Number: [phone]

## 2025-07-28 NOTE — ANESTHESIA PROCEDURE NOTES
HISTORY OF PRESENT ILLNESS      I had the pleasure of seeing Kun Suazo, a 71 year old male, per Neal Stearns MD request for right knee pain which has been occuring for 1 week. The pain came on suddenly while he has been caring back the been down his stairs.  There is not a history of trauma.  He states it is getting better over the last week.  There is not a history of swelling, locking and giving out of the knee. The knee pain is medial.  He describes the pain as constant ache, dull and annoying. Aggravating factors are any weight bearing, going up and down stairs, standing, walking and weather/temperature changes Alleviating factors are rest Pain level on scale of 1 to 10 with 10 being the worst is a 6  or 7 .Treatments consisted of nothing specific, with significant relief..      PAST MEDICAL, FAMILY AND SOCIAL HISTORY      Past Medical History:   Diagnosis Date   • Arthritis     Bilateral knees   • Backache, unspecified 2/01    LBP   • Calculus of kidney 4/96;    x 4 total   • Cardiomyopathy (CMS/HCC)    • Gastroesophageal reflux disease    • Hyperlipidemia    • Pain in joint, lower leg 6/99    L knee pain, effusion, DJD   • Shingles 10/2008      Past Surgical History:   Procedure Laterality Date   • Arthrotomy/explore/treat knee joint      2 open on the left 1 open on the right and 1 arthroscopic on the right   • Cardiac stress test complete  1/99;     Cardiac Stress Test for family hx.  Normal test.  Dr. Tavera   • Colonoscopy diagnostic  8/19/02; 4/08    Colonoscopy   • Left heart cath,percutaneous  12/29/04    Dr. 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   Tobacco Use   • Smoking status: Never Smoker   • Smokeless tobacco: Never Used   Vaping Use   • Vaping Use: never used  Peripheral IV  Date/Time: 7/28/2025 10:45 AM      Placement  Needle size: 16 G  Laterality: right  Location: wrist  Local anesthetic: none  Site prep: alcohol  Technique: anatomical landmarks  Attempts: 2           Substance and Sexual Activity   • Alcohol use: Yes     Alcohol/week: 0.0 standard drinks     Comment: holidays   • Drug use: No   • Sexual activity: Yes     Partners: Female      Current Outpatient Medications   Medication Sig Dispense Refill   • tamsulosin (Flomax) 0.4 MG Cap Take 1 capsule by mouth daily. 90 capsule 1   • losartan (COZAAR) 25 MG tablet TAKE 1 TABLET BY MOUTH  DAILY 90 tablet 3   • carvedilol (COREG) 6.25 MG tablet TAKE 1 TABLET BY MOUTH  TWICE DAILY WITH MEALS 180 tablet 3   • naproxen sodium (ALEVE) 220 MG tablet Take 220 mg by mouth 2 times daily (with meals).     • Methylcellulose, Laxative, (CITRUCEL PO) Take 2 capsules by mouth daily.     • amoxicillin (AMOXIL) 500 MG capsule Take 4 caps 1 hour prior to dental work 8 capsule 1     No current facility-administered medications for this visit.      ALLERGIES:  No Known Allergies    REVIEW OF SYSTEMS      Constitutional:  Denies fever, chills, sweats, fatigue, or unexplained weight change.  Head: Denies headache, trauma, or LOC (loss of consciousness).  Eyes:  Denies change in vision,  blurred vision, diplopia, or eye pain.   Ears: Denies earache, change in hearing, tinnitus or vertigo.   Nose:  Denies epistaxis, nasal congestion, rhinorrhea, or PND (postnasal drip).   Throat: Denies sore throat, dysphagia, or hoarseness.   Cardiovascular: Denies angina, chest pain, palpitations, recent MI (myocardial infarction), murmurs, syncopal episodes, claudication, or acute CHF (congestive heart failure) the past six months.  Respiratory:  Denies cough, wheezing/asthma, SOB (shortness of breath), PND (paroxysmal nocturnal dyspnea), or orthopnea.  Gastrointestinal:  Denies dysphagia, dyspepsia, abdominal pain, N/V/D/C (nausea/vomiting/diarrhea/constipation), melena, jaundice, change in bowel function, rectal bleeding, incontinence.   Genitourinary:  Denies dysuria, polyuria, hematuria, incontinence, nocturia.   Integumentary: Denies rashes, lesions,  change in hair or nails.   Neurological: Denies seizures, syncope, paralysis, tremor, weakness, numbness or  tingling.   Psychiatric:  Denies depression, suicide attempts/plans, or anxiety.   Endocrine:  Denies temperature intolerance, polyuria, polydipsia, or polyphagia.    Hematologic/Lymphatic: No history of anemia, abnormal bleeding, excessive bruising,  lymphadenopathy.       PHYSICAL EXAM      Visit Vitals  Resp 20   Ht 5' 2.5\" (1.588 m)   Wt 81.9 kg (180 lb 8 oz)   BMI 32.49 kg/m²      GENERAL: The patient is well nourished, well developed, and in no acute distress.   NEUROLOGICAL: The patient is awake, alert, and oriented to time, place, and person.   Patient has full sensation to light touch bilateral lower extremities. Patient responds appropriately to questions and answers.   VASCULAR: The bilateral lower extremity there is no varicosities.  There is not edema.  Posterior tibial pulses intact.  GAIT: The patient can rise to a standing position  without difficulty. He ambulates without a limp.  SKIN: There are no rashes or sores on the lower extremities. Skin and integumentary   system are intact, warm, and dry.   HEAD: Normocephalic, atraumatic.    EXTREMITIES: Patient has normal coordination of both upper and lower extremities.      Both hips were symmetrical, had good range of motion, no instability, and good muscle tone. Examination of the bilateral lower extremities reveals normal capillary refill. There is no obvious muscle atrophy of either extremity.  Patient has Normal knee alignment.  Thigh and calf are soft and non-tender. The unaffected knee has full flexion and extension.     Compared to the opposite extremity the bilateral Medial joint line is tender to palpation. Range of motion of the right is 0 to  125.  He does has patellar crepitus with range of motion. There is not a  joint effusion. There is a positive patellar grind test.  His patella tracks well.  There is not patellar apprehension.   Normal static alignment of patella.  Jairo's test is negative, anterior and posterior drawer test is negative.  Pivot shift is negative.  He is stable to varus and valgus stress at 0 and 30 degrees extension.  Posterior Sag Test:  Negative for PCL instability  Posterior Drawer Test:  Negative for PCL instability  There is not a prepatellar bursitis.      X-RAY INTERPRETATION: Standing AP x-ray of right knees shows moderate medial and lateral narrowing of the joint space.  The right knee has minimal lateral patella tilt and moderate arthritic changes.      ASSESSMENT/PLAN      IMPRESSION: right knee pain advanced patellofemoral medial and lateral compartment osteoarthrosis     TREATMENT AND RECOMMENDATIONS: For Kun Suazo I recommend ice, NSAIDS, activity modification and Injections:  Cortisone versus Hyalgan.   Patient will follow-up prn.  Seen as how he is minimally symptomatic at this time will hold off on any further treatment.  He will follow as needed for possibility of injections.  Patient is to contact me should he have any further questions or concerns.    Cc: Neal Stearns MD

## 2025-07-28 NOTE — ANESTHESIA POSTPROCEDURE EVALUATION
Patient: Dayan Lopez    Procedure Summary       Date: 07/28/25 Room / Location: [facility] OR 22 / Virtual [facility] OR    Anesthesia Start: 1006 Anesthesia Stop:     Procedure: Distal Pancreatectomy / Splenectomy (Abdomen) Diagnosis:       Pancreatic mass ([facility])      (Pancreatic mass ([facility]) [K86.89])    Surgeons: Jay Fong MD Responsible Provider: Luke Pineda MD    Anesthesia Type: general ASA Status: 2            Anesthesia Type: general    Vitals Value Taken Time   /59 07/28/25 13:42   Temp 36 07/28/25 13:42   Pulse 54 07/28/25 13:42   Resp 16 07/28/25 13:42   SpO2 98 07/28/25 13:42       Anesthesia Post Evaluation    Patient location during evaluation: PACU  Patient participation: complete - patient cannot participate  Level of consciousness: sleepy but conscious and responsive to verbal stimuli  Pain management: adequate  Airway patency: patent  Cardiovascular status: acceptable  Respiratory status: acceptable, face mask and spontaneous ventilation  Hydration status: acceptable  Postoperative Nausea and Vomiting: none        There were no known notable events for this encounter.

## 2025-07-28 NOTE — CONSULTS
Dayan Lopez is a 69 y.o. year old female patient who presents for distal pancreatectomy/splenectomy with Dr. Fong on 7/28/25. Acute Pain consulted for block for postoperative pain control.     Anticipated Postop Pain Issues -   Palliative: typically relieved with IV analgesics and regional local anesthetics  Provocative: typically with movement  Quality: typically burning and aching  Radiation: typically none  Severity: typically severe 8-10/10  Timing: typically constant    Medical History[1]     Surgical History[2]     Family History[3]     Social History     Socioeconomic History    Marital status:      Spouse name: Not on file    Number of children: Not on file    Years of education: Not on file    Highest education level: Not on file   Occupational History    Occupation: retired     Comment: insurance   Tobacco Use    Smoking status: Never    Smokeless tobacco: Never   Substance and Sexual Activity    Alcohol use: Not Currently     Alcohol/week: 1.0 standard drink of alcohol     Types: 1 Glasses of wine per week     Comment: once a month    Drug use: Never    Sexual activity: Not on file   Other Topics Concern    Not on file   Social History Narrative    Not on file     Social Drivers of Health     Financial Resource Strain: Not on File (3/16/2021)    Received from [facility]    Financial Resource Strain     Financial Resource Strain: 0   Food Insecurity: No Food Insecurity (11/18/2024)    Hunger Vital Sign     Worried About Running Out of Food in the Last Year: Never true     Ran Out of Food in the Last Year: Never true   Transportation Needs: Not on File (3/16/2021)    Received from [facility]    Transportation Needs     Transportation: 0   Physical Activity: Not on File (3/16/2021)    Received from [facility]    Physical Activity     Physical Activity: 0   Stress: Not on File (3/16/2021)    Received from [facility]    Stress     Stress: 0   Social Connections: Not on File (3/16/2021)    Received from [facility]     Social Connections     Social Connections and Isolation: 0   Intimate Partner Violence: Not on file   Housing Stability: Not on File (3/16/2021)    [facility]    Housing Stability     Housin        RX Allergies[4]      Review of Systems    Gen: No fatigue, anorexia, insomnia, fever.   Eyes: No vision loss, double vision, drainage, eye pain.   ENT: No pharyngitis, dry mouth, no hearing changes or ear discharge  Cardiac: No chest pain, palpitations, syncope, near syncope.   Pulmonary: No shortness of breath, cough, hemoptysis.   Heme/lymph: No swollen glands, fever, bleeding.   GI: No abdominal pain, change in bowel habits, melena, hematemesis, hematochezia, nausea, vomiting, diarrhea.   : No discharge, dysuria, frequency, urgency, hematuria.  Endo: No polyuria or weight loss.   Musculoskeletal: Negative for any pain or loss of ROM/weakness  Skin: No rashes or lesions  Neuro: Normal speech, no numbness or weakness. No gait difficulties  Review of systems is otherwise negative unless stated above or in history of present illness.  Review of systems is otherwise negative unless stated above or in history of present illness.    Physical Exam:   Constitutional:  no distress, alert and cooperative  Eyes: clear sclera  Head/Neck: No apparent injury, trachea midline  Respiratory/Thorax: Patent airways, thorax symmetric, breathing comfortably  Cardiovascular: no pitting edema  Gastrointestinal: Nondistended  Musculoskeletal: ROM intact  Extremities: no clubbing  Neurological: alert, hinojosa x4  Psychological: Appropriate affect    No results found for this or any previous visit (from the past 24 hours).     Dayan Lopez is a 69 y.o. year old female patient who presents for distal pancreatectomy/splenectomy with Dr. Fong on 25. Acute Pain consulted for block for postoperative pain control.     Plan:    - KANDI with catheters bilateral blocks performed preoperatively on 25  - Ambit ball with  Ropivacaine 0.2%/NaCl 0.9% 500mL, Rate 7 cc/hr bilaterally  - Please notify Acute Pain service before initiation of anticoagulation and/or dual antiplatelet therapy if patient has indwelling nerve block catheter(s) in place  - Ambit medication will not interfere with pain medication prescribed by the primary team.   - Please be aware of local anesthetic toxic dose and absorption variability before considering lidocaine patches  - Acute pain service will follow while catheters in place  - Rest of pain management per primary team    Acute Pain Resident  pg [phone] ph [phone]        [1]   Past Medical History:  Diagnosis Date    Allergic 1979    Anxiety 2008    On sertraline    Benign paroxysmal positional vertigo 07/28/2009    Chronic kidney disease 12/2024    On farxiga    Coronary artery disease 07/2007    COVID     Dizziness About 7 years ago    Have had therapy in the past    Endometrial cancer (Multi) 10/24/2023    Epistaxis ER 11/23. [facility]    Childhood    Fractures 2000(?)    Have 2 plates and 1 riri in my rt leg    GERD (gastroesophageal reflux disease) 1990?    On omeprazole    Heart disease 2007    Hernia, internal During my routine colonoscopies- 10 years or so    Never treated    Hypertension 2007    Hypothyroidism 11/2023    On levothyroxine    MI (myocardial infarction) (Multi) 03/18/2013    Formatting of this note might be different from the original. 2007    Old myocardial infarction     History of myocardial infarction    Personal history of malignant neoplasm of other parts of uterus     History of cancer of uterus    Personal history of other diseases of the digestive system     History of gastroesophageal reflux (GERD)    Personal history of other specified conditions     History of vertigo    Uterine cancer (Multi) 10/24/2023   [2]   Past Surgical History:  Procedure Laterality Date    CARDIAC CATHETERIZATION  07/2007    COLONOSCOPY  10/11/2019    diverticulosis, otherwise normal, due in  2029    CORONARY ANGIOPLASTY WITH STENT PLACEMENT  2007    FRACTURE SURGERY  04/2000    HYSTERECTOMY  12/05/2014    Hysterectomy    OTHER SURGICAL HISTORY  12/05/2014    Transcath Placement Of Intrathoracic Carotid Artery Stent    OTHER SURGICAL HISTORY  12/05/2014    Treatment Of The Right Leg   [3]   Family History  Problem Relation Name Age of Onset    Heart disease Mother Hillary     Osteoporosis Mother Hillary     Stroke Mother Hillary 40 - 49    Liver cancer Father Sheldon     Diabetes Father Sheldon 50 - 59    Cancer Father Sheldon 60 - 69    Heart disease Sister Yesi 50 - 59    Heart disease Brother      Cancer Brother  50 - 59        hpv oral    Breast cancer Mother's Sister Susan     Cancer Mother's Sister Susan 50 - 59    Heart disease Brother Arturo 60 - 69    Cancer Mother's Brother Robert 60 - 69    Miscarriages / Stillbirths Mother Hillary 20 - 29    Heart disease Brother Rj 60 - 69   [4]   Allergies  Allergen Reactions    Demerol [Meperidine] Other    Meperidine (Pf) Hives and Itching

## 2025-07-28 NOTE — ANESTHESIA PREPROCEDURE EVALUATION
Patient: Dayan Lopez    Procedure Information       Date/Time: 07/28/25 0940    Procedure: Distal Pancreatectomy / Splenectomy    Location: [facility] OR 22 / [facility] OR    Surgeons: Jay Fong MD            Relevant Problems   Cardiac   (+) Arteriosclerosis of coronary artery   (+) HTN (hypertension)   (+) Hyperlipidemia   (+) Non-ST elevation (NSTEMI) myocardial infarction (Multi)      Neuro   (+) Anxiety      GI   (+) Acid reflux   (+) GERD (gastroesophageal reflux disease)      /Renal   (+) Hydronephrosis determined by ultrasound      Endocrine   (+) Acquired hypothyroidism       Clinical information reviewed:   Tobacco  Allergies  Meds   Med Hx  Surg Hx  OB Status  Fam Hx  Soc   Hx        NPO Detail:  No data recorded     Physical Exam    Airway  Mallampati: I  TM distance: <3 FB  Neck ROM: full  Mouth opening: 3 or more finger widths     Cardiovascular   Rhythm: regular     Dental    Pulmonary Breath sounds clear to auscultation     Abdominal            Anesthesia Plan    History of general anesthesia?: yes  History of complications of general anesthesia?: no    ASA 2     general     The patient is not a current smoker.    Anesthetic plan and risks discussed with patient.    Plan discussed with attending and CAA.

## 2025-07-28 NOTE — BRIEF OP NOTE
Date: 2025  OR Location: [facility] OR    Name: Dayan Lopez, : 1955, Age: 69 y.o., MRN: [id number], Sex: female    Diagnosis  Pre-op Diagnosis      * Pancreatic mass (HHS-HCC) [K86.89] Post-op Diagnosis     * Pancreatic mass (HHS-HCC) [K86.89]     Procedures  Distal Pancreatectomy / Splenectomy  05611 - HI PNCRTECT DSTL STOT W/O PNCRTCOJEJUNOSTOMY      Surgeons      * Jay Fong - Primary    Resident/Fellow/Other Assistant:  Surgeons and Role:     * Kim Jewell MD - Resident - Assisting     * Ele Parker MD - Resident - Assisting    Staff:   Circulator: Luciano Tavarezub Person: Maurice Tavarezub Person: Nilda Walker Person: Yaw Torres Scrub: Ivan  Relief Circulator: Ivan    Anesthesia Staff: Anesthesiologist: Luke Pineda MD  C-AA: CHANO Hassan; CHANO Del Rio    Procedure Summary  Anesthesia: General  ASA: II  Estimated Blood Loss: 25mL  Intra-op Medications:   Administrations occurring from 0940 to 1440 on 25:   Medication Name Total Dose   HYDROmorphone (Dilaudid) injection 0.5 mg 0.5 mg   heparin (porcine) injection 5,000 Units 5,000 Units   calcium chloride 10% 0.5 g   cefOXitin (Mefoxin) vial 2 g 2 g   dexAMETHasone (Decadron) 4 mg/mL 4 mg   dexmedeTOMIDine 4 mcg/mL in 100 mL NS infusion 45.48 mcg   dexmedeTOMIDine (Precedex) bolus from bag 42.5 mcg   fentaNYL (Sublimaze) injection 50 mcg/mL 100 mcg   glycopyrrolate (Robinul) injection 0.2 mg   HYDROmorphone (Dilaudid) injection 1 mg/mL 1.5 mg   LR bolus Cannot be calculated   LR bolus Cannot be calculated   lidocaine (cardiac) injection 2% prefilled syringe 80 mg   midazolam PF (Versed) injection 1 mg/mL 1 mg   ondansetron (Zofran) 2 mg/mL injection 4 mg   phenylephrine 40 mcg/mL syringe 10 mL 400 mcg   propofol (Diprivan) injection 10 mg/mL 150 mg   rocuronium (ZeMuron) 50 mg/5 mL injection 80 mg   succinylcholine (Anectine) 20 mg/mL injection 160 mg   sugammadex (Bridion) 200 mg/2 mL  injection 200 mg              Anesthesia Record               Intraprocedure I/O Totals          Intake    Dexmedetomidine 0.00 mL    The total shown is the total volume documented since Anesthesia Start was filed.    LR bolus 1000.00 mL    Total Intake 1000 mL       Output    Urine 430 mL    Est. Blood Loss 25 mL    Total Output 455 mL       Net    Net Volume 545 mL          Specimen:   ID Type Source Tests Collected by Time   1 : OMENTUM WITH SPLENIAL Tissue OMENTUM RESECTION SURGICAL PATHOLOGY EXAM Jay Fong MD 7/28/2025 1151   2 : DISTAL PANCREAS AND SPLEEN Tissue PANCREAS RESECTION, DISTAL (TAIL) SURGICAL PATHOLOGY EXAM Jay Fong MD 7/28/2025 1157      Findings: Intra-op pathology showed negative margins. Drain x 2 left and right against pancreatic edge.    Complications:  None; patient tolerated the procedure well.     Disposition: PACU - hemodynamically stable.  Condition: stable  Specimens Collected:   ID Type Source Tests Collected by Time   1 : OMENTUM WITH SPLENIAL Tissue OMENTUM RESECTION SURGICAL PATHOLOGY EXAM Jay Fong MD 7/28/2025 1151   2 : DISTAL PANCREAS AND SPLEEN Tissue PANCREAS RESECTION, DISTAL (TAIL) SURGICAL PATHOLOGY EXAM Jay Fong MD 7/28/2025 1157     Attending Attestation: I was present and scrubbed for the entire procedure.    Jay Fong  Phone Number: [phone]

## 2025-07-28 NOTE — ANESTHESIA PROCEDURE NOTES
Airway  Date/Time: 7/28/2025 10:19 AM  Reason: elective    Airway not difficult    Staffing  Performed: CHANO   Authorized by: Luke Pineda MD    Performed by: CHANO Hassan  Patient location during procedure: OR    Patient Condition  Indications for airway management: anesthesia  Patient position: sniffing  Sedation level: deep     Final Airway Details   Preoxygenated: yes  Final airway type: endotracheal airway  Successful airway: ETT  Cuffed: yes   Successful intubation technique: direct laryngoscopy  Endotracheal tube insertion site: oral  Blade: Elias  Blade size: #3  ETT size (mm): 7.0  Cormack-Lehane Classification: grade I - full view of glottis  Placement verified by: chest auscultation and capnometry   Measured from: lips  ETT to lips (cm): 21  Number of attempts at approach: 1  Number of other approaches attempted: 0

## 2025-07-28 NOTE — ANESTHESIA PROCEDURE NOTES
Arterial Line:    Date/Time: 7/28/2025 10:43 AM    Staffing  Performed: attending   Authorized by: Luke Pineda MD    Performed by: CHANO Hassan    An arterial line was placed. Procedure performed using surface landmarks.in the OR for the following indication(s): continuous blood pressure monitoring.    A 22 gauge (size), 1 inch (length), Angiocath (type) catheter was placed into the Left radial artery, secured by Tegaderm,   Seldinger technique not used.  Events:  patient tolerated procedure well with no complications.

## 2025-07-29 LAB
ALBUMIN SERPL BCP-MCNC: 3.3 G/DL (ref 3.4–5)
ALP SERPL-CCNC: 47 U/L (ref 33–136)
ALT SERPL W P-5'-P-CCNC: 19 U/L (ref 7–45)
AMYLASE SERPL-CCNC: 65 U/L (ref 29–103)
ANION GAP SERPL CALC-SCNC: 15 MMOL/L (ref 10–20)
APTT PPP: 25 SECONDS (ref 26–36)
AST SERPL W P-5'-P-CCNC: 33 U/L (ref 9–39)
BILIRUB SERPL-MCNC: 0.6 MG/DL (ref 0–1.2)
BUN SERPL-MCNC: 17 MG/DL (ref 6–23)
CALCIUM SERPL-MCNC: 8.5 MG/DL (ref 8.6–10.6)
CHLORIDE SERPL-SCNC: 102 MMOL/L (ref 98–107)
CO2 SERPL-SCNC: 26 MMOL/L (ref 21–32)
CREAT SERPL-MCNC: 0.85 MG/DL (ref 0.5–1.05)
EGFRCR SERPLBLD CKD-EPI 2021: 75 ML/MIN/1.73M*2
ERYTHROCYTE [DISTWIDTH] IN BLOOD BY AUTOMATED COUNT: 15.3 % (ref 11.5–14.5)
GLUCOSE BLD MANUAL STRIP-MCNC: 132 MG/DL (ref 74–99)
GLUCOSE BLD MANUAL STRIP-MCNC: 141 MG/DL (ref 74–99)
GLUCOSE BLD MANUAL STRIP-MCNC: 150 MG/DL (ref 74–99)
GLUCOSE BLD MANUAL STRIP-MCNC: 151 MG/DL (ref 74–99)
GLUCOSE BLD MANUAL STRIP-MCNC: 166 MG/DL (ref 74–99)
GLUCOSE BLD MANUAL STRIP-MCNC: 172 MG/DL (ref 74–99)
GLUCOSE SERPL-MCNC: 132 MG/DL (ref 74–99)
HCT VFR BLD AUTO: 35.6 % (ref 36–46)
HGB BLD-MCNC: 10.2 G/DL (ref 12–16)
INR PPP: 1.1 (ref 0.9–1.1)
MAGNESIUM SERPL-MCNC: 1.66 MG/DL (ref 1.6–2.4)
MCH RBC QN AUTO: 24.5 PG (ref 26–34)
MCHC RBC AUTO-ENTMCNC: 28.7 G/DL (ref 32–36)
MCV RBC AUTO: 86 FL (ref 80–100)
NRBC BLD-RTO: 0 /100 WBCS (ref 0–0)
PHOSPHATE SERPL-MCNC: 5.4 MG/DL (ref 2.5–4.9)
PLATELET # BLD AUTO: 294 X10*3/UL (ref 150–450)
POTASSIUM SERPL-SCNC: 4.5 MMOL/L (ref 3.5–5.3)
PROT SERPL-MCNC: 5.7 G/DL (ref 6.4–8.2)
PROTHROMBIN TIME: 12.5 SECONDS (ref 9.8–12.4)
RBC # BLD AUTO: 4.16 X10*6/UL (ref 4–5.2)
SODIUM SERPL-SCNC: 138 MMOL/L (ref 136–145)
WBC # BLD AUTO: 19.3 X10*3/UL (ref 4.4–11.3)

## 2025-07-29 PROCEDURE — 82947 ASSAY GLUCOSE BLOOD QUANT: CPT

## 2025-07-29 PROCEDURE — 82150 ASSAY OF AMYLASE: CPT | Performed by: STUDENT IN AN ORGANIZED HEALTH CARE EDUCATION/TRAINING PROGRAM

## 2025-07-29 PROCEDURE — 84075 ASSAY ALKALINE PHOSPHATASE: CPT | Performed by: STUDENT IN AN ORGANIZED HEALTH CARE EDUCATION/TRAINING PROGRAM

## 2025-07-29 PROCEDURE — 97530 THERAPEUTIC ACTIVITIES: CPT | Mod: GP

## 2025-07-29 PROCEDURE — 1200000003 HC ONCOLOGY  ROOM WITH TELEMETRY DAILY

## 2025-07-29 PROCEDURE — 85027 COMPLETE CBC AUTOMATED: CPT | Performed by: STUDENT IN AN ORGANIZED HEALTH CARE EDUCATION/TRAINING PROGRAM

## 2025-07-29 PROCEDURE — 97116 GAIT TRAINING THERAPY: CPT | Mod: GP

## 2025-07-29 PROCEDURE — 2500000004 HC RX 250 GENERAL PHARMACY W/ HCPCS (ALT 636 FOR OP/ED)

## 2025-07-29 PROCEDURE — 97161 PT EVAL LOW COMPLEX 20 MIN: CPT | Mod: GP

## 2025-07-29 PROCEDURE — 2500000002 HC RX 250 W HCPCS SELF ADMINISTERED DRUGS (ALT 637 FOR MEDICARE OP, ALT 636 FOR OP/ED): Performed by: STUDENT IN AN ORGANIZED HEALTH CARE EDUCATION/TRAINING PROGRAM

## 2025-07-29 PROCEDURE — 2500000001 HC RX 250 WO HCPCS SELF ADMINISTERED DRUGS (ALT 637 FOR MEDICARE OP)

## 2025-07-29 PROCEDURE — 84100 ASSAY OF PHOSPHORUS: CPT | Performed by: STUDENT IN AN ORGANIZED HEALTH CARE EDUCATION/TRAINING PROGRAM

## 2025-07-29 PROCEDURE — 36415 COLL VENOUS BLD VENIPUNCTURE: CPT | Performed by: STUDENT IN AN ORGANIZED HEALTH CARE EDUCATION/TRAINING PROGRAM

## 2025-07-29 PROCEDURE — 85610 PROTHROMBIN TIME: CPT | Performed by: STUDENT IN AN ORGANIZED HEALTH CARE EDUCATION/TRAINING PROGRAM

## 2025-07-29 PROCEDURE — 83735 ASSAY OF MAGNESIUM: CPT | Performed by: STUDENT IN AN ORGANIZED HEALTH CARE EDUCATION/TRAINING PROGRAM

## 2025-07-29 PROCEDURE — 2500000004 HC RX 250 GENERAL PHARMACY W/ HCPCS (ALT 636 FOR OP/ED): Performed by: NURSE PRACTITIONER

## 2025-07-29 PROCEDURE — 2500000004 HC RX 250 GENERAL PHARMACY W/ HCPCS (ALT 636 FOR OP/ED): Performed by: STUDENT IN AN ORGANIZED HEALTH CARE EDUCATION/TRAINING PROGRAM

## 2025-07-29 RX ORDER — DEXTROSE MONOHYDRATE, SODIUM CHLORIDE, AND POTASSIUM CHLORIDE 50; 1.49; 4.5 G/1000ML; G/1000ML; G/1000ML
75 INJECTION, SOLUTION INTRAVENOUS CONTINUOUS
Status: DISCONTINUED | OUTPATIENT
Start: 2025-07-29 | End: 2025-07-30

## 2025-07-29 RX ORDER — METHOCARBAMOL 100 MG/ML
1000 INJECTION, SOLUTION INTRAMUSCULAR; INTRAVENOUS EVERY 8 HOURS
Status: DISCONTINUED | OUTPATIENT
Start: 2025-07-29 | End: 2025-07-30

## 2025-07-29 RX ORDER — LEVOTHYROXINE SODIUM 25 UG/1
25 TABLET ORAL DAILY
Status: DISCONTINUED | OUTPATIENT
Start: 2025-07-29 | End: 2025-08-02 | Stop reason: HOSPADM

## 2025-07-29 RX ORDER — METHOCARBAMOL 500 MG/1
500 TABLET, FILM COATED ORAL 4 TIMES DAILY PRN
Status: ON HOLD | COMMUNITY
Start: 2025-07-29

## 2025-07-29 RX ORDER — MAGNESIUM SULFATE HEPTAHYDRATE 40 MG/ML
4 INJECTION, SOLUTION INTRAVENOUS ONCE
Status: COMPLETED | OUTPATIENT
Start: 2025-07-29 | End: 2025-07-29

## 2025-07-29 RX ORDER — DEXTROMETHORPHAN HYDROBROMIDE, GUAIFENESIN 5; 100 MG/5ML; MG/5ML
650 LIQUID ORAL EVERY 8 HOURS PRN
COMMUNITY
Start: 2025-07-29 | End: 2025-08-01 | Stop reason: HOSPADM

## 2025-07-29 RX ORDER — KETOROLAC TROMETHAMINE 15 MG/ML
15 INJECTION, SOLUTION INTRAMUSCULAR; INTRAVENOUS EVERY 6 HOURS
Status: DISPENSED | OUTPATIENT
Start: 2025-07-29 | End: 2025-07-31

## 2025-07-29 RX ORDER — CHLORHEXIDINE GLUCONATE 40 MG/ML
1 SOLUTION TOPICAL DAILY
Qty: 473 ML | Refills: 0 | Status: ON HOLD | OUTPATIENT
Start: 2025-07-29

## 2025-07-29 RX ORDER — ENOXAPARIN SODIUM 100 MG/ML
40 INJECTION SUBCUTANEOUS EVERY 24 HOURS
Qty: 22 EACH | Refills: 0 | Status: ON HOLD | OUTPATIENT
Start: 2025-07-30

## 2025-07-29 RX ORDER — NALOXONE HYDROCHLORIDE 4 MG/.1ML
SPRAY NASAL AS NEEDED
Qty: 2 EACH | Status: ON HOLD | OUTPATIENT
Start: 2025-07-29

## 2025-07-29 RX ORDER — LEVOTHYROXINE SODIUM 25 UG/1
25 TABLET ORAL DAILY
Start: 2025-07-30 | End: 2025-08-01 | Stop reason: HOSPADM

## 2025-07-29 RX ADMIN — ACETAMINOPHEN 1000 MG: 10 INJECTION INTRAVENOUS at 00:08

## 2025-07-29 RX ADMIN — KETOROLAC TROMETHAMINE 15 MG: 15 INJECTION, SOLUTION INTRAMUSCULAR; INTRAVENOUS at 08:31

## 2025-07-29 RX ADMIN — PANTOPRAZOLE SODIUM 20 MG: 40 INJECTION, POWDER, FOR SOLUTION INTRAVENOUS at 08:32

## 2025-07-29 RX ADMIN — METOPROLOL TARTRATE 5 MG: 1 INJECTION, SOLUTION INTRAVENOUS at 10:24

## 2025-07-29 RX ADMIN — KETOROLAC TROMETHAMINE 15 MG: 15 INJECTION, SOLUTION INTRAMUSCULAR; INTRAVENOUS at 21:50

## 2025-07-29 RX ADMIN — ENOXAPARIN SODIUM 40 MG: 100 INJECTION SUBCUTANEOUS at 08:32

## 2025-07-29 RX ADMIN — METHOCARBAMOL 1000 MG: 100 INJECTION INTRAMUSCULAR; INTRAVENOUS at 16:44

## 2025-07-29 RX ADMIN — ACETAMINOPHEN 1000 MG: 10 INJECTION INTRAVENOUS at 05:18

## 2025-07-29 RX ADMIN — LEVOTHYROXINE SODIUM 25 MCG: 0.03 TABLET ORAL at 06:48

## 2025-07-29 RX ADMIN — ACETAMINOPHEN 1000 MG: 10 INJECTION INTRAVENOUS at 10:24

## 2025-07-29 RX ADMIN — MAGNESIUM SULFATE HEPTAHYDRATE 4 G: 40 INJECTION, SOLUTION INTRAVENOUS at 08:32

## 2025-07-29 RX ADMIN — ONDANSETRON 4 MG: 2 INJECTION INTRAMUSCULAR; INTRAVENOUS at 03:14

## 2025-07-29 RX ADMIN — ONDANSETRON 4 MG: 2 INJECTION INTRAMUSCULAR; INTRAVENOUS at 22:01

## 2025-07-29 RX ADMIN — ACETAMINOPHEN 1000 MG: 10 INJECTION INTRAVENOUS at 23:23

## 2025-07-29 RX ADMIN — INSULIN LISPRO 1 UNITS: 100 INJECTION, SOLUTION INTRAVENOUS; SUBCUTANEOUS at 16:44

## 2025-07-29 RX ADMIN — METOPROLOL TARTRATE 5 MG: 1 INJECTION, SOLUTION INTRAVENOUS at 23:23

## 2025-07-29 RX ADMIN — KETOROLAC TROMETHAMINE 15 MG: 15 INJECTION, SOLUTION INTRAMUSCULAR; INTRAVENOUS at 14:26

## 2025-07-29 RX ADMIN — METHOCARBAMOL 1000 MG: 100 INJECTION INTRAMUSCULAR; INTRAVENOUS at 08:32

## 2025-07-29 RX ADMIN — INSULIN LISPRO 1 UNITS: 100 INJECTION, SOLUTION INTRAVENOUS; SUBCUTANEOUS at 12:38

## 2025-07-29 RX ADMIN — DEXTROSE, SODIUM CHLORIDE, AND POTASSIUM CHLORIDE 75 ML/HR: 5; .45; .15 INJECTION INTRAVENOUS at 09:39

## 2025-07-29 RX ADMIN — INSULIN LISPRO 1 UNITS: 100 INJECTION, SOLUTION INTRAVENOUS; SUBCUTANEOUS at 00:08

## 2025-07-29 ASSESSMENT — PAIN - FUNCTIONAL ASSESSMENT
PAIN_FUNCTIONAL_ASSESSMENT: 0-10

## 2025-07-29 ASSESSMENT — COGNITIVE AND FUNCTIONAL STATUS - GENERAL
STANDING UP FROM CHAIR USING ARMS: A LITTLE
MOVING TO AND FROM BED TO CHAIR: A LITTLE
TOILETING: A LITTLE
MOVING FROM LYING ON BACK TO SITTING ON SIDE OF FLAT BED WITH BEDRAILS: A LITTLE
MOBILITY SCORE: 18
TURNING FROM BACK TO SIDE WHILE IN FLAT BAD: A LITTLE
WALKING IN HOSPITAL ROOM: A LITTLE
DRESSING REGULAR LOWER BODY CLOTHING: A LITTLE
DAILY ACTIVITIY SCORE: 20
CLIMB 3 TO 5 STEPS WITH RAILING: A LITTLE
MOVING TO AND FROM BED TO CHAIR: A LITTLE
DRESSING REGULAR UPPER BODY CLOTHING: A LITTLE
MOBILITY SCORE: 18
WALKING IN HOSPITAL ROOM: A LITTLE
STANDING UP FROM CHAIR USING ARMS: A LITTLE
CLIMB 3 TO 5 STEPS WITH RAILING: A LITTLE
MOVING FROM LYING ON BACK TO SITTING ON SIDE OF FLAT BED WITH BEDRAILS: A LITTLE
TURNING FROM BACK TO SIDE WHILE IN FLAT BAD: A LITTLE
HELP NEEDED FOR BATHING: A LITTLE

## 2025-07-29 ASSESSMENT — PAIN SCALES - GENERAL
PAINLEVEL_OUTOF10: 6
PAINLEVEL_OUTOF10: 2
PAINLEVEL_OUTOF10: 5 - MODERATE PAIN

## 2025-07-29 ASSESSMENT — ACTIVITIES OF DAILY LIVING (ADL)
EFFECT OF PAIN ON DAILY ACTIVITIES: DISCOMFORT
ADL_ASSISTANCE: INDEPENDENT

## 2025-07-29 ASSESSMENT — PAIN DESCRIPTION - DESCRIPTORS: DESCRIPTORS: ACHING;DISCOMFORT;TENDER

## 2025-07-29 NOTE — SIGNIFICANT EVENT
Rapid Response Nurse Note: RADAR alert: 7    Pager time:   Arrival time:   Event end time:   Location: [facility] 6  [] Triage by phone or secure messaging    Rapid response initiated by:  [] Rapid response RN [] Family [] Nursing Supervisor [] Physician   [x] RADAR auto page [] Sepsis auto-page [] RN [] RT   [] NP/PA [] Other:     Primary reason for call:   [] BAT [] New CPAP/BiPAP [] Bleeding [] Change in mental status   [] Chest pain [] Code blue [] FiO2 >/= 50% [] HR </= 40 bpm   [] HR >/= 130 bpm [] Hyperglycemia [] Hypoglycemia [x] RADAR    [] RR </= 8 bpm [] RR >/= 30 bpm [] SBP </= 90 mmHg [] SpO2 < 90%   [] Seizure [] Sepsis [] Shortness of breath  [] Staff concern: see comments     Initial VS and/or RADAR VS: T 37.4 °C; HR 60; RR 18; BP 84/54; SPO2 93%.    Providers present at bedside (if applicable):     Name of ICU Provider contacted (if applicable):     Interventions:  [] None [] ABG/VBG [] Assist w/ICU transfer [] BAT paged    [] Bag mask [] Blood [] Cardioversion [] Code Blue   [] Code blue for intubation [] Code status changed [] Chest x-ray [] EKG   [] IV fluid/bolus [] KUB x-ray [] Labs/cultures [] Medication   [] Nebulizer treatment [] NIPPV (CPAP/BiPAP) [] Oxygen [] Oral airway   [] Peripheral IV [] Palliative care consult [] CT/MRI [] Sepsis protocol    [] Suctioned [x] Other: orthostatics     Outcome:  [] Coded and  [] Code blue for intubation [] Coded and transferred to ICU []  on division   [x] Remained on division (no change)tele [] Remained on division + additional monitoring [] Remained in ED [] Transferred to ED   [] Transferred to ICU [] Transferred to inpatient status [] Transferred for interventions (procedure) [] Transferred to ICU stepdown    [] Transferred to surgery [] Transferred to telemetry [] Sepsis protocol [] STEMI protocol   [] Stroke protocol [x] Bedside nurse instructed to page rapid response for any concerns or acute change in condition/VS  "    Additional Comments: RADAR alert- score of 7. Reviewed VS with bedside nurse- BP has been a lil softer. Pt on a PCA pump and has not had much PO since surgery. Primary team had asked for orthostatics. Orthostatics completed- laying and sitting with feet on the ground. Pt declined standing as she \"feels exhausted\"- pt had worked with PT and sat in the chair today. Documented on the flow sheet. A and O and no complaints. Updated bedside nurse with vitals review. Pt to remain on the floor with tele for continued plan of care.     "

## 2025-07-29 NOTE — PROGRESS NOTES
Surgical Oncology Progress Note      07/29/25      Dayan Lopez is a 69F w h/o HLD, HTN, CHF, NSTEMI s/p PCI (2007), hypothyroid, endometrial Ca s/p hysterectomy, CKD. Here for pancreas bx suspicious for malignancy s/p DPS 7/28. POD1.    Subjective   NAEON. Patient seen and evaluated by team at bedside this AM. Pain is 3/10, did not need PCA much after adjusting her sleeping position to her right side. Was able to sleep. Not passing gas yet. Transiently nauseous last night, resolved by Zofran. Denies vomiting.    Review of Systems:    A 12-point review of systems was performed, and was negative except as above.    Objective    Objective:  Vital signs:   Temp:  [36.7 °C (98 °F)-37.1 °C (98.8 °F)] 36.9 °C (98.4 °F)  Heart Rate:  [] 58  Resp:  [12-19] 18  BP: ()/(51-89) 105/58  Arterial Line BP 1: ()/() 287/285    Physical Exam:  GEN: No acute distress. Alert, awake and conversant.  HEENT: Sclera anicteric. Moist mucous membranes.  RESP: Breathing non-labored, equal chest rise. On 2LNC.  CV: Regular rate, normotensive  GI: Abdomen soft, nondistended, appropriately tender for postoperative course. Midline wound with dressing intact, small amounts of strikethrough on inferior portion of dressing consistent from yesterday evening. L and R drain sites with 4x4 over top; drains SS bilaterally.  : Hudson in place with clear urine.  MSK: No gross deformities. Moves all extremities spontaneously.  NEURO: Alert and oriented x3. No focal deficits.  PSYCH: Appropriate mood and affect.  SKIN: No rashes or lesions.    I/O last 2 completed shifts:  In: 1526.7 (17.4 mL/kg) [I.V.:526.7 (6 mL/kg); IV Piggyback:1000]  Out: 1235 (14.1 mL/kg) [Urine:970 (0.5 mL/kg/hr); Drains:240; Blood:25]  Weight: 87.7 kg      Labs Past 18 Hours:  Recent Results (from the past 18 hours)   POCT GLUCOSE    Collection Time: 07/28/25  5:29 PM   Result Value Ref Range    POCT Glucose 172 (H) 74 - 99 mg/dL   POCT GLUCOSE     Collection Time: 07/28/25  8:12 PM   Result Value Ref Range    POCT Glucose 181 (H) 74 - 99 mg/dL   POCT GLUCOSE    Collection Time: 07/29/25 12:08 AM   Result Value Ref Range    POCT Glucose 172 (H) 74 - 99 mg/dL   POCT GLUCOSE    Collection Time: 07/29/25  3:32 AM   Result Value Ref Range    POCT Glucose 132 (H) 74 - 99 mg/dL   CBC    Collection Time: 07/29/25  6:19 AM   Result Value Ref Range    WBC 19.3 (H) 4.4 - 11.3 x10*3/uL    nRBC 0.0 0.0 - 0.0 /100 WBCs    RBC 4.16 4.00 - 5.20 x10*6/uL    Hemoglobin 10.2 (L) 12.0 - 16.0 g/dL    Hematocrit 35.6 (L) 36.0 - 46.0 %    MCV 86 80 - 100 fL    MCH 24.5 (L) 26.0 - 34.0 pg    MCHC 28.7 (L) 32.0 - 36.0 g/dL    RDW 15.3 (H) 11.5 - 14.5 %    Platelets 294 150 - 450 x10*3/uL   Comprehensive metabolic panel    Collection Time: 07/29/25  6:19 AM   Result Value Ref Range    Glucose 132 (H) 74 - 99 mg/dL    Sodium 138 136 - 145 mmol/L    Potassium 4.5 3.5 - 5.3 mmol/L    Chloride 102 98 - 107 mmol/L    Bicarbonate 26 21 - 32 mmol/L    Anion Gap 15 10 - 20 mmol/L    Urea Nitrogen 17 6 - 23 mg/dL    Creatinine 0.85 0.50 - 1.05 mg/dL    eGFR 75 >60 mL/min/1.73m*2    Calcium 8.5 (L) 8.6 - 10.6 mg/dL    Albumin 3.3 (L) 3.4 - 5.0 g/dL    Alkaline Phosphatase 47 33 - 136 U/L    Total Protein 5.7 (L) 6.4 - 8.2 g/dL    AST 33 9 - 39 U/L    Bilirubin, Total 0.6 0.0 - 1.2 mg/dL    ALT 19 7 - 45 U/L   Phosphorus    Collection Time: 07/29/25  6:19 AM   Result Value Ref Range    Phosphorus 5.4 (H) 2.5 - 4.9 mg/dL   Amylase    Collection Time: 07/29/25  6:19 AM   Result Value Ref Range    Amylase 65 29 - 103 U/L   Magnesium    Collection Time: 07/29/25  6:19 AM   Result Value Ref Range    Magnesium 1.66 1.60 - 2.40 mg/dL   Coagulation Screen    Collection Time: 07/29/25  6:19 AM   Result Value Ref Range    Protime 12.5 (H) 9.8 - 12.4 seconds    INR 1.1 0.9 - 1.1    aPTT 25 (L) 26 - 36 seconds      Meds:  Current Medications[1]     Imaging:  Imaging  No results found.    Cardiology,  Vascular, and Other Imaging  No other imaging results found for the past 2 days      No pertinent imaging to review.    Medications reviewed.  Vital signs reviewed.  Labs reviewed.         Assessment/Plan    Assessment and Plan:  Bandar Lopez is a [place]  69F w h/o HLD, HTN, CHF, NSTEMI s/p PCI (2007), hypothyroid, endometrial Ca s/p hysterectomy, CKD. Here for pancreas bx suspicious for malignancy s/p DPS 7/28, POD1. Patient is in stable condition and unchanged from previous exam. Doing well postoperatively.    Plan Today:   Neuro:   - Multimodal pain regimen. IV Tylenol q6h, IV Toradol, Dilaudid PCA, Ropivacaine gtt.  - Restart home sertraline for anxiety.  CV:   - Metoprolol 25mg BID ordered, hold if hypotensive.   - Continue home lipitor.   - Hold home entresto.   Pulm:   - Wean O2 as able. Encourage IS, OOB, and ambulation.  GI:   - Advance diet to limited CLD. Will monitor glucose, on SSI.  - Continue daily PPI, Zofran prn.  - F/U pathology results.   Renal:   - On 5% D5W 0.45% NaCl 20mEq KCl at 75Ml/hr.  - Hold home Farxiga.  - Monitor Bun/Cr, UOP.  - Plan to remove banks 7/30  Endo:   - Restart home synthroid 25 mcg.   - CTM blood glucose, on SSI 1.   Anticoagulation: Lovenox 40mg, SCDs  MSK:   - continue PT/OT    Hospital Day: 2   Dispo: Continue current level of care     Note written by BANDAR BAKER, Acting intern, MS4      Agree with plan as written above.     Patient discussed and seen with Dr. Fong.      Jackie Leonard MD  PGY-1 General Surgery  Surgical Oncology [id number]            [1]   Current Facility-Administered Medications:     acetaminophen (Ofirmev) injection 1,000 mg, 1,000 mg, intravenous, q6h, Kim Jewell MD, Stopped at 07/29/25 0538    [Held by provider] atorvastatin (Lipitor) tablet 10 mg, 10 mg, oral, Daily, Kim Jewell MD    [Held by provider] cetirizine (ZyrTEC) tablet 10 mg, 10 mg, oral, Daily, Kim Jewell MD    dextrose 5 % and sodium chloride 0.45 %  with KCl 20 mEq/L infusion, 75 mL/hr, intravenous, Continuous, MEMO Green    dextrose 50 % injection 12.5 g, 12.5 g, intravenous, q15 min PRN, Kim Jewell MD    dextrose 50 % injection 25 g, 25 g, intravenous, q15 min PRN, Kim Jewell MD    enoxaparin (Lovenox) syringe 40 mg, 40 mg, subcutaneous, q24h, Kim Jewell MD    glucagon (Glucagen) injection 1 mg, 1 mg, intramuscular, q15 min PRN, Kim Jewell MD    hydromorphone PCA 0.5 mg/mL in NS opioid naive, , intravenous, Continuous, Kim Jewell MD, Rate Verify at 07/29/25 0327    insulin lispro injection 0-5 Units, 0-5 Units, subcutaneous, q4h, Kim Jewell MD, 1 Units at 07/29/25 0008    ketorolac (Toradol) injection 15 mg, 15 mg, intravenous, q6h, MEMO Green    [Held by provider] levothyroxine (Synthroid) injection 12.5 mcg, 12.5 mcg, intravenous, q24h OSCARKim MD    levothyroxine (Synthroid, Levoxyl) tablet 25 mcg, 25 mcg, oral, Daily, Jackie Leonard MD, 25 mcg at 07/29/25 0648    magnesium sulfate 4 g in sterile water for injection 100 mL, 4 g, intravenous, Once, MEMO Green    methocarbamol (Robaxin) injection 1,000 mg, 1,000 mg, intravenous, q8h, MEMO Green    [Held by provider] metoprolol succinate XL (Toprol-XL) 24 hr tablet 25 mg, 25 mg, oral, q12h OSCARKim MD    metoprolol tartrate (Lopressor) injection 5 mg, 5 mg, intravenous, q6h, Kim Jewell MD    naloxone (Narcan) injection 0.2 mg, 0.2 mg, intravenous, PRN, Kim Jewell MD    ondansetron ODT (Zofran-ODT) disintegrating tablet 4 mg, 4 mg, oral, q8h PRN **OR** ondansetron (Zofran) injection 4 mg, 4 mg, intravenous, q8h PRN, Kim Jewell MD, 4 mg at 07/29/25 0314    pantoprazole (Protonix) injection 20 mg, 20 mg, intravenous, Daily, Kim Jewell MD    ropivacaine (PF) in NS cmpd (Naropin) 1000 mg/500 mL (0.2%) infusion, 14 mL/hr,  infiltration, Continuous, Monica Gordon MD, Last Rate: 14 mL/hr at 07/28/25 2029, 14 mL/hr at 07/28/25 2029    [Held by provider] sacubitriL-valsartan (Entresto) 49-51 mg per tablet 1 tablet, 1 tablet, oral, BID, Kim Jewell MD    [Held by provider] sertraline (Zoloft) tablet 50 mg, 50 mg, oral, Daily, Kim Jewell MD

## 2025-07-29 NOTE — CARE PLAN
The patient's goals for the shift include      The clinical goals for the shift include patient will remain HDS      Problem: Pain - Adult  Goal: Verbalizes/displays adequate comfort level or baseline comfort level  Outcome: Progressing     Problem: Safety - Adult  Goal: Free from fall injury  Outcome: Progressing     Problem: Discharge Planning  Goal: Discharge to home or other facility with appropriate resources  Outcome: Progressing     Problem: Chronic Conditions and Co-morbidities  Goal: Patient's chronic conditions and co-morbidity symptoms are monitored and maintained or improved  Outcome: Progressing     Problem: Nutrition  Goal: Nutrient intake appropriate for maintaining nutritional needs  Outcome: Progressing     Problem: Skin  Goal: Decreased wound size/increased tissue granulation at next dressing change  Outcome: Progressing  Goal: Participates in plan/prevention/treatment measures  Outcome: Progressing  Goal: Prevent/manage excess moisture  Outcome: Progressing  Goal: Prevent/minimize sheer/friction injuries  Outcome: Progressing  Goal: Promote/optimize nutrition  Outcome: Progressing  Goal: Promote skin healing  Outcome: Progressing

## 2025-07-29 NOTE — PROGRESS NOTES
Physical Therapy    Physical Therapy Evaluation & Treatment    Patient Name: Dayan Lopez  MRN: [id number]  Department: [facility]  Room: [facility]  Today's Date: 7/29/2025   Time Calculation  Start Time: 1513  Stop Time: 1605  Time Calculation (min): 52 min    Assessment/Plan   PT Assessment  PT Assessment Results: Decreased strength, Decreased range of motion, Decreased endurance, Impaired balance, Decreased mobility, Orthopedic restrictions, Pain  Rehab Prognosis: Good  Barriers to Discharge Home: No anticipated barriers  Evaluation/Treatment Tolerance: Patient limited by fatigue, Patient limited by pain  Medical Staff Made Aware: Yes  Strengths: Attitude of self, Coping skills, Support of extended family/friends  Barriers to Participation: Comorbidities  End of Session Communication: Bedside nurse  Assessment Comment: Pt presented with an unsteady gait using a wheeled walker, but appropriate for low intensity level therapy after d/c.  End of Session Patient Position: Bed, 3 rail up, Alarm off, not on at start of session   IP OR SWING BED PT PLAN  Inpatient or Swing Bed: Inpatient  PT Plan  Treatment/Interventions: Bed mobility, Transfer training, Gait training, Stair training, Balance training, Strengthening, Endurance training, Range of motion, Therapeutic exercise, Therapeutic activity, Home exercise program  PT Plan: Ongoing PT  PT Frequency: 3 times per week  PT Discharge Recommendations: Low intensity level of continued care  Equipment Recommended upon Discharge: Wheeled walker  PT Recommended Transfer Status: Assist x1  PT - OK to Discharge: Yes    Subjective     PT Visit Info:  PT Received On: 07/29/25  General Visit Information:  General  Reason for Referral: 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  Home Living:  Home Living  Type of Home: House  Lives With: Spouse  Home Adaptive Equipment: Walker rolling or standard  Home Layout: Two level  Home Access: Stairs to enter with rails  Entrance Stairs-Rails: Right  Entrance Stairs-Number of Steps: 3  Bathroom Shower/Tub: Tub/shower unit, Walk-in shower  Bathroom Toilet: Handicapped height  Bathroom Equipment: Built-in shower seat, Bedside commode  Prior Level of Function:  Prior Function Per Pt/Caregiver Report  Level of [place]: Independent with ADLs and functional transfers, Independent with homemaking with ambulation  Receives Help From: Family  ADL Assistance: Independent  Homemaking Assistance: Independent  Ambulatory Assistance: Independent  Vocational: Retired  Leisure: Shop and romo  Hand Dominance: Right  Prior Function Comments: Pt was independent with all mobility without an assistive device in the household and in the community.  Precautions:  Precautions  Hearing/Visual Limitations: Hearing and vision WFL with glasses.  Medical Precautions: Abdominal precautions, Fall precautions, Oxygen therapy device and L/min  Post-Surgical Precautions: Abdominal surgery precautions  Precautions Comment: Pt in compliance with precautions throughout therapy session.    Objective   Pain:  Pain Assessment  Pain Assessment: 0-10  0-10 (Numeric) Pain Score: 2  Pain Type: Acute pain, Surgical pain  Pain Location: Abdomen  Pain Orientation: Lower  Pain Radiating Towards: whole abdomen  Pain Descriptors: Aching, Discomfort, Tender  Pain Frequency: Constant/continuous  Pain Onset: Ongoing  Clinical Progression: Gradually worsening (with movement)  Effect of Pain on Daily Activities: Discomfort  Patient's Stated Pain Goal: No pain  Pain Interventions: Heat  applied  Response to Interventions: Relief  Cognition:  Cognition  Overall Cognitive Status: Within Functional Limits  Arousal/Alertness: Appropriate responses to stimuli  Orientation Level: Oriented X4  Following Commands: Follows all commands and directions without difficulty  Safety Judgment: Good awareness of safety precautions  Problem Solving: Able to problem solve independently  Cognition Comments: normal  Attention: Within Functional Limits    General Assessments:  General Observation  General Observation: Pt presented with an unsteady gait using a wheeled walker.     Activity Tolerance  Endurance: Decreased tolerance for upright activites    Sensation  Light Touch: No apparent deficits    Strength  Strength Comments: Trunk and Casey LE decreased strength.  Perception  Inattention/Neglect: Appears intact    Coordination  Movements are Fluid and Coordinated: Yes  Coordination Comment: WFL    Postural Control  Postural Control: Within Functional Limits  Posture Comment: Pt presented with good sitting posture and good standing posture using a wheeled walker.    Static Sitting Balance  Static Sitting-Balance Support: Bilateral upper extremity supported, Feet supported  Static Sitting-Level of Assistance: Close supervision  Static Sitting-Comment/Number of Minutes: Sitting EOB  Dynamic Sitting Balance  Dynamic Sitting-Balance Support: Bilateral upper extremity supported, Feet supported  Dynamic Sitting-Level of Assistance: Close supervision  Dynamic Sitting-Comments: Sitting EOB    Static Standing Balance  Static Standing-Balance Support: Bilateral upper extremity supported  Static Standing-Level of Assistance: Contact guard  Static Standing-Comment/Number of Minutes: using a wheeled walker  Dynamic Standing Balance  Dynamic Standing-Balance Support: Bilateral upper extremity supported  Dynamic Standing-Level of Assistance: Contact guard  Dynamic Standing-Comments: using a wheeled walker  Functional  Assessments:  Bed Mobility  Bed Mobility: Yes  Bed Mobility 1  Bed Mobility 1: Supine to sitting  Level of Assistance 1: Minimum assistance  Bed Mobility Comments 1: lateral roll technique  Bed Mobility 2  Bed Mobility  2: Sitting to supine  Level of Assistance 2: Minimum assistance  Bed Mobility Comments 2: log roll technique    Transfers  Transfer: Yes  Transfer 1  Transfer From 1: Sit to  Transfer to 1: Stand  Transfer Device 1: Walker  Transfer Level of Assistance 1: Minimum assistance  Transfers 2  Transfer From 2: Stand to  Transfer to 2: Sit  Transfer Device 2: Walker  Transfer Level of Assistance 2: Contact guard    Ambulation/Gait Training  Ambulation/Gait Training Performed: Yes  Ambulation/Gait Training 1  Surface 1: Level tile  Device 1: Rolling walker  Assistance 1: Minimum assistance  Quality of Gait 1: Decreased step length (unsteady, decreased sandra, decreased endurance)  Comments/Distance (ft) 1: 10ft    Stairs  Stairs: No  Extremity/Trunk Assessments:  Cervical Spine   Cervical Spine: Within Functional Limits  Lumbar Spine   Lumbar Spine : Exceptions to Funtional Limits  Lumbar Spine Comment: Decreased strength and ROM.    RUE   RUE : Within Functional Limits  LUE   LUE: Within Functional Limits  RLE   RLE : Exceptions to WFL  AROM RLE (degrees)  RLE AROM Comment: WFL  Strength RLE  R Hip Flexion: 3+/5  R Knee Flexion: 4/5  R Knee Extension: 4/5  R Ankle Dorsiflexion: 5/5  R Ankle Plantar Flexion: 5/5  LLE   LLE : Exceptions to WFL  AROM LLE (degrees)  LLE AROM Comment: WFL  Strength LLE  L Hip Flexion: 3+/5  L Knee Flexion: 4/5  L Knee Extension: 4/5  L Ankle Dorsiflexion: 5/5  L Ankle Plantar Flexion: 5/5  Treatments:     Bed Mobility  Bed Mobility: Yes  Bed Mobility 1  Bed Mobility 1: Supine to sitting  Level of Assistance 1: Minimum assistance  Bed Mobility Comments 1: lateral roll technique  Bed Mobility 2  Bed Mobility  2: Sitting to supine  Level of Assistance 2: Minimum assistance  Bed  Mobility Comments 2: log roll technique    Ambulation/Gait Training  Ambulation/Gait Training Performed: Yes  Ambulation/Gait Training 1  Surface 1: Level tile  Device 1: Rolling walker  Assistance 1: Minimum assistance  Quality of Gait 1: Decreased step length (unsteady, decreased sandra, decreased endurance)  Comments/Distance (ft) 1: 10ft  Transfers  Transfer: Yes  Transfer 1  Transfer From 1: Sit to  Transfer to 1: Stand  Transfer Device 1: Walker  Transfer Level of Assistance 1: Minimum assistance  Transfers 2  Transfer From 2: Stand to  Transfer to 2: Sit  Transfer Device 2: Walker  Transfer Level of Assistance 2: Contact guard    Stairs  Stairs: No  Outcome Measures:  [facility] Basic Mobility  Turning from your back to your side while in a flat bed without using bedrails: A little  Moving from lying on your back to sitting on the side of a flat bed without using bedrails: A little  Moving to and from bed to chair (including a wheelchair): A little  Standing up from a chair using your arms (e.g. wheelchair or bedside chair): A little  To walk in hospital room: A little  Climbing 3-5 steps with railing: A little  Basic Mobility - Total Score: 18    Encounter Problems       Encounter Problems (Active)       Balance       STG - Maintains independent dynamic standing balance with upper extremity support using a wheeled walker. (Progressing)       Start:  07/29/25    Expected End:  08/12/25       INTERVENTIONS:  1. Practice standing with minimal support.  2. Educate patient about standing tolerance.  3. Educate patient about independence with gait, transfers, and ADL's.  4. Educate patient about use of assistive device.  5. Educate patient about self-directed care.         STG - Maintains independent static standing balance with upper extremity support using a wheeled walker. (Progressing)       Start:  07/29/25    Expected End:  08/12/25       INTERVENTIONS:  1. Practice standing with minimal support.  2. Educate  patient about standing tolerance.  3. Educate patient about independence with gait, transfers, and ADL's.  4. Educate patient about use of assistive device.  5. Educate patient about self-directed care.            Mobility       STG - Patient will ambulate 125ft, independently using a wheeled walker. (Progressing)       Start:  07/29/25    Expected End:  08/12/25            STG - Patient will ascend and descend four to six stairs, independently using a standard cane and one rail. (Progressing)       Start:  07/29/25    Expected End:  08/12/25               PT Transfers       STG - Transfer from bed to chair, independently using a wheeled walker. (Progressing)       Start:  07/29/25    Expected End:  08/12/25            STG - Patient to transfer to and from sit to supine, independently. (Progressing)       Start:  07/29/25    Expected End:  08/12/25            STG - Patient will transfer sit to and from stand, independently using a wheeled walker. (Progressing)       Start:  07/29/25    Expected End:  08/12/25                   Education Documentation  Handouts, taught by Gael Sanchez PT at 7/29/2025  4:22 PM.  Learner: Patient  Readiness: Acceptance  Method: Explanation, Demonstration, Handout  Response: Verbalizes Understanding, Demonstrated Understanding  Comment: Pt received education on abdominal precautions through explanation and handout, bed mobility using the log roll and lateral roll techniques, safe mobility using a wheeled walker with a sit to stand transfer and gait training performing the step-to gait.    Precautions, taught by Gael Sanchez PT at 7/29/2025  4:22 PM.  Learner: Patient  Readiness: Acceptance  Method: Explanation, Demonstration, Handout  Response: Verbalizes Understanding, Demonstrated Understanding  Comment: Pt received education on abdominal precautions through explanation and handout, bed mobility using the log roll and lateral roll techniques, safe mobility using a wheeled  walker with a sit to stand transfer and gait training performing the step-to gait.    Body Mechanics, taught by Gael Sanchez PT at 7/29/2025  4:22 PM.  Learner: Patient  Readiness: Acceptance  Method: Explanation, Demonstration, Handout  Response: Verbalizes Understanding, Demonstrated Understanding  Comment: Pt received education on abdominal precautions through explanation and handout, bed mobility using the log roll and lateral roll techniques, safe mobility using a wheeled walker with a sit to stand transfer and gait training performing the step-to gait.    Home Exercise Program, taught by Gael Sanchez PT at 7/29/2025  4:22 PM.  Learner: Patient  Readiness: Acceptance  Method: Explanation, Demonstration, Handout  Response: Verbalizes Understanding, Demonstrated Understanding  Comment: Pt received education on abdominal precautions through explanation and handout, bed mobility using the log roll and lateral roll techniques, safe mobility using a wheeled walker with a sit to stand transfer and gait training performing the step-to gait.    Mobility Training, taught by Gael Sanchez PT at 7/29/2025  4:22 PM.  Learner: Patient  Readiness: Acceptance  Method: Explanation, Demonstration, Handout  Response: Verbalizes Understanding, Demonstrated Understanding  Comment: Pt received education on abdominal precautions through explanation and handout, bed mobility using the log roll and lateral roll techniques, safe mobility using a wheeled walker with a sit to stand transfer and gait training performing the step-to gait.    Education Comments  No comments found.

## 2025-07-29 NOTE — PROGRESS NOTES
07/29/25 1300   Discharge Planning   Living Arrangements Spouse/significant other   Support Systems Spouse/significant other;Family members   Assistance Needed none   Type of Residence Private residence   Who is requesting discharge planning? Provider   Home or Post Acute Services Other (Comment)  (TBD)   Expected Discharge Disposition Home   Does the patient need discharge transport arranged? No     Dayan Lopez is a 69F w h/o HLD, HTN, CHF, NSTEMI s/p PCI (2007), hypothyroid, endometrial Ca s/p hysterectomy, CKD. Here for pancreas bx suspicious for malignancy s/p DPS 7/28, POD1. Patient is in stable condition and unchanged from previous exam. Doing well postoperatively. ADOD 8/1, PT/OT pending.     Care Transitions Note 7/29/25 @ 1:56pm     Plan per Medical/Surgical Team: continue to monitor post-op, PT/OT pending   Status: inpatient  Payor Source: Medicare part A&B, [employer]   Discharge disposition: TBD  Expected date of discharge: 8/1  Barriers: none   PCP / Primary Oncologist: MEMO Garcia  Preferred Pharmacy: [facility]   Preferred home care agency: [facility]  DME: walker   O2: none   Dialysis: n/a  Diabetes/Supplies needed: n/a     TCC met with patient at bedside to discuss anticipated discharge needs. Introduced self and role to complete admission assessment. Demographics and insurance verifed with patient. Patient lives in a 1-story home with her . She has a walker but does not use it. No home O2 requirements prior to admission. Not active with home care. Discussed choices with patient for home care and patient lists [facility] as AOC if recommended. Denies falls at home. Reports feeling safe at home. PT/OT pending at this time.  Will continue to follow to assist with any discharge planning needs. Dasia Garay, MORENO TCC

## 2025-07-29 NOTE — PROGRESS NOTES
Postop Pain HPI -   Palliative: relieved with IV analgesics and regional local anesthetics  Provocative: movement  Quality:  burning and aching  Radiation:  none  Severity:  5/10  Timing: constant    24-HOUR OPIOID CONSUMPTION:  Dilaudid PCA 2mg    Scheduled medications  Scheduled Medications[1]  Continuous medications  Continuous Medications[2]  PRN medications  PRN Medications[3]     Physical Exam:  Constitutional:  no distress, alert and cooperative  Eyes: clear sclera  Head/Neck: No apparent injury, trachea midline  Respiratory/Thorax: Patent airways, thorax symmetric, breathing comfortably  Cardiovascular: no pitting edema  Gastrointestinal: Nondistended  Musculoskeletal: ROM intact  Extremities: no clubbing  Neurological: alert, hinojosa x4  Psychological: Appropriate affect    Results for orders placed or performed during the hospital encounter of 07/28/25 (from the past 24 hours)   Verify ABO/Rh Group Test (VERAB)   Result Value Ref Range    ABO TYPE O     Rh TYPE POS    Blood Gas Arterial Full Panel   Result Value Ref Range    POCT pH, Arterial 7.42 7.38 - 7.42 pH    POCT pCO2, Arterial 33 (L) 38 - 42 mm Hg    POCT pO2, Arterial 130 (H) 85 - 95 mm Hg    POCT SO2, Arterial 100 94 - 100 %    POCT Oxy Hemoglobin, Arterial 97.7 94.0 - 98.0 %    POCT Hematocrit Calculated, Arterial 29.0 (L) 36.0 - 46.0 %    POCT Sodium, Arterial 138 136 - 145 mmol/L    POCT Potassium, Arterial 3.6 3.5 - 5.3 mmol/L    POCT Chloride, Arterial 113 (H) 98 - 107 mmol/L    POCT Ionized Calcium, Arterial 1.03 (L) 1.10 - 1.33 mmol/L    POCT Glucose, Arterial 100 (H) 74 - 99 mg/dL    POCT Lactate, Arterial 1.1 0.4 - 2.0 mmol/L    POCT Base Excess, Arterial -2.6 (L) -2.0 - 3.0 mmol/L    POCT HCO3 Calculated, Arterial 21.4 (L) 22.0 - 26.0 mmol/L    POCT Hemoglobin, Arterial 9.8 (L) 12.0 - 16.0 g/dL    POCT Anion Gap, Arterial 7 (L) 10 - 25 mmo/L    Patient Temperature 37.0 degrees Celsius    FiO2 50 %   POCT GLUCOSE   Result Value Ref Range     POCT Glucose 172 (H) 74 - 99 mg/dL   POCT GLUCOSE   Result Value Ref Range    POCT Glucose 181 (H) 74 - 99 mg/dL   POCT GLUCOSE   Result Value Ref Range    POCT Glucose 172 (H) 74 - 99 mg/dL   POCT GLUCOSE   Result Value Ref Range    POCT Glucose 132 (H) 74 - 99 mg/dL   CBC   Result Value Ref Range    WBC 19.3 (H) 4.4 - 11.3 x10*3/uL    nRBC 0.0 0.0 - 0.0 /100 WBCs    RBC 4.16 4.00 - 5.20 x10*6/uL    Hemoglobin 10.2 (L) 12.0 - 16.0 g/dL    Hematocrit 35.6 (L) 36.0 - 46.0 %    MCV 86 80 - 100 fL    MCH 24.5 (L) 26.0 - 34.0 pg    MCHC 28.7 (L) 32.0 - 36.0 g/dL    RDW 15.3 (H) 11.5 - 14.5 %    Platelets 294 150 - 450 x10*3/uL   Comprehensive metabolic panel   Result Value Ref Range    Glucose 132 (H) 74 - 99 mg/dL    Sodium 138 136 - 145 mmol/L    Potassium 4.5 3.5 - 5.3 mmol/L    Chloride 102 98 - 107 mmol/L    Bicarbonate 26 21 - 32 mmol/L    Anion Gap 15 10 - 20 mmol/L    Urea Nitrogen 17 6 - 23 mg/dL    Creatinine 0.85 0.50 - 1.05 mg/dL    eGFR 75 >60 mL/min/1.73m*2    Calcium 8.5 (L) 8.6 - 10.6 mg/dL    Albumin 3.3 (L) 3.4 - 5.0 g/dL    Alkaline Phosphatase 47 33 - 136 U/L    Total Protein 5.7 (L) 6.4 - 8.2 g/dL    AST 33 9 - 39 U/L    Bilirubin, Total 0.6 0.0 - 1.2 mg/dL    ALT 19 7 - 45 U/L   Phosphorus   Result Value Ref Range    Phosphorus 5.4 (H) 2.5 - 4.9 mg/dL   Amylase   Result Value Ref Range    Amylase 65 29 - 103 U/L   Magnesium   Result Value Ref Range    Magnesium 1.66 1.60 - 2.40 mg/dL   Coagulation Screen   Result Value Ref Range    Protime 12.5 (H) 9.8 - 12.4 seconds    INR 1.1 0.9 - 1.1    aPTT 25 (L) 26 - 36 seconds   POCT GLUCOSE   Result Value Ref Range    POCT Glucose 150 (H) 74 - 99 mg/dL        Plan:  - Bilateral KANDI blocks performed preoperatively on 7/28  - Ambit ball with Ropivacaine 0.2%/NaCl 0.9% 500mL, Rate 7 cc/hr bilaterally  - Will change bag and bolus today  - Please notify Acute Pain service before initiation of anticoagulation and/or dual antiplatelet therapy if patient has  indwelling nerve block catheter(s) in place  - Ambit medication will not interfere with pain medication prescribed by the primary team.   - Please be aware of local anesthetic toxic dose and absorption variability before considering lidocaine patches  - Acute pain service will follow while catheters in place  - Rest of pain management per primary team      Monica Gordon MD  Acute Pain Resident  pg [phone] ph [phone]         [1] acetaminophen, 1,000 mg, intravenous, q6h  [Held by provider] atorvastatin, 10 mg, oral, Daily  [Held by provider] cetirizine, 10 mg, oral, Daily  enoxaparin, 40 mg, subcutaneous, q24h  insulin lispro, 0-5 Units, subcutaneous, q4h  ketorolac, 15 mg, intravenous, q6h  [Held by provider] levothyroxine, 12.5 mcg, intravenous, q24h OSCAR  levothyroxine, 25 mcg, oral, Daily  magnesium sulfate, 4 g, intravenous, Once  methocarbamol, 1,000 mg, intravenous, q8h  [Held by provider] metoprolol succinate XL, 25 mg, oral, q12h OSCAR  metoprolol, 5 mg, intravenous, q6h  pantoprazole, 20 mg, intravenous, Daily  [Held by provider] sacubitriL-valsartan, 1 tablet, oral, BID  [Held by provider] sertraline, 50 mg, oral, Daily  [2] potassium [id number]%NaCl, 75 mL/hr  HYDROmorphone,   ropivacaine (PF) in NS cmpd, 14 mL/hr, Last Rate: 14 mL/hr (07/28/25 2029)  [3] PRN medications: dextrose, dextrose, glucagon, naloxone, ondansetron ODT **OR** ondansetron

## 2025-07-29 NOTE — CARE PLAN
Problem: Pain - Adult  Goal: Verbalizes/displays adequate comfort level or baseline comfort level  Outcome: Progressing     Problem: Safety - Adult  Goal: Free from fall injury  Outcome: Progressing     Problem: Discharge Planning  Goal: Discharge to home or other facility with appropriate resources  Outcome: Progressing     Problem: Chronic Conditions and Co-morbidities  Goal: Patient's chronic conditions and co-morbidity symptoms are monitored and maintained or improved  Outcome: Progressing     Problem: Nutrition  Goal: Nutrient intake appropriate for maintaining nutritional needs  Outcome: Progressing     Problem: Skin  Goal: Decreased wound size/increased tissue granulation at next dressing change  7/28/2025 2346 by Luna Vu RN  Outcome: Progressing  7/28/2025 2346 by Luna Vu RN  Flowsheets (Taken 7/28/2025 2346)  Decreased wound size/increased tissue granulation at next dressing change:   Promote sleep for wound healing   Protective dressings over bony prominences   Utilize specialty bed per algorithm  Goal: Participates in plan/prevention/treatment measures  7/28/2025 2346 by Luna Vu RN  Outcome: Progressing  7/28/2025 2346 by Luna Vu RN  Flowsheets (Taken 7/28/2025 2346)  Participates in plan/prevention/treatment measures:   Discuss with provider PT/OT consult   Elevate heels   Increase activity/out of bed for meals  Goal: Prevent/manage excess moisture  7/28/2025 2346 by Luna Vu RN  Outcome: Progressing  7/28/2025 2346 by Luna Vu RN  Flowsheets (Taken 7/28/2025 2346)  Prevent/manage excess moisture:   Monitor for/manage infection if present   Cleanse incontinence/protect with barrier cream   Follow provider orders for dressing changes   Use wicking fabric (obtain order)   Moisturize dry skin  Goal: Prevent/minimize sheer/friction injuries  7/28/2025 2346 by Luna Vu RN  Outcome: Progressing  7/28/2025 2346 by Luna Vu RN  Flowsheets (Taken  7/28/2025 2346)  Prevent/minimize sheer/friction injuries:   Increase activity/out of bed for meals   Complete micro-shifts as needed if patient unable. Adjust patient position to relieve pressure points, not a full turn   HOB 30 degrees or less   Use pull sheet  Goal: Promote/optimize nutrition  7/28/2025 2346 by Luna Vu RN  Outcome: Progressing  7/28/2025 2346 by Luna Vu RN  Flowsheets (Taken 7/28/2025 2346)  Promote/optimize nutrition:   Assist with feeding   Monitor/record intake including meals   Offer water/supplements/favorite foods   Consume > 50% meals/supplements   Discuss with provider if NPO > 2 days   Reassess MST if dietician not consulted  Goal: Promote skin healing  7/28/2025 2346 by Luna Vu RN  Outcome: Progressing  7/28/2025 2346 by Luna Vu RN  Flowsheets (Taken 7/28/2025 2346)  Promote skin healing:   Protective dressings over bony prominences   Assess skin/pad under line(s)/device(s)   Turn/reposition every 2 hours/use positioning/transfer devices   Ensure correct size (line/device) and apply per  instructions   Rotate device position/do not position patient on device

## 2025-07-29 NOTE — SIGNIFICANT EVENT
Postoperative Check Note    Subjective  Dayan Lopez is a 69 y.o. female who is now POD0 s/p distal pancreatectomy and splenectomy. Postoperatively, patient feels well, and denies fevers/chills, n/v, chest pain, shortness of breath. Feels her pain is well-controlled and appropriate for this time. Has no other concerns.    Objective  Vitals:  Visit Vitals  /56   Pulse 71   Temp 36.9 °C (98.5 °F) (Oral)   Resp 18       Physical Exam:  GEN: No acute distress. Alert, awake and conversant.  HEENT: Sclera anicteric. Moist mucous membranes.  RESP: Breathing non-labored, equal chest rise. On RA.  CV: Regular rate, normotensive  GI: Abdomen soft, nondistended, appropriately tender for postoperative course. Midline incision dressing intact with small spot of serous drainage inferiorly and b/l TRIPP drains serosanguinous   : Hudson in place with clear urine.  MSK: No gross deformities. Moves all extremities spontaneously.  NEURO: Alert and oriented x3. No focal deficits.  PSYCH: Appropriate mood and affect.    Most recent labs:            13.3     7.3>-----<364              44.9     141  100  15                  ----------------<98     4.5  31  0.74            Mg No results found for requested labs within last 365 days.         Assessment  Dayan Lopez is a 69 y.o. female who is now POD0 s/p distal pancreatectomy and splenectomy.  Patient is in stable condition, appropriate for postoperative course. The plan is as follows:    - Will continue to optimize pain management, current pain regimen IV tylenol, ropivacaine blocks, PCA PRN  - pulse ox, tele  - NPO sips with meds  - LR at 100 ml/hr  - PPI daily  - PRN zofran   - SSI #1 q4h with hypoglycemia protocol  - DVT ppx: scds, plan for lovenox 7/29 pending CBC      Garcia Fleming MD  PGY-1 General Surgery

## 2025-07-30 ENCOUNTER — HOME HEALTH ADMISSION (OUTPATIENT)
Dept: HOME HEALTH SERVICES | Facility: HOME HEALTH | Age: 70
End: 2025-07-30
Payer: MEDICARE

## 2025-07-30 PROBLEM — K86.89 PANCREATIC MASS (HHS-HCC): Status: RESOLVED | Noted: 2025-07-09 | Resolved: 2025-07-30

## 2025-07-30 LAB
ALBUMIN SERPL BCP-MCNC: 3.3 G/DL (ref 3.4–5)
ALP SERPL-CCNC: 66 U/L (ref 33–136)
ALT SERPL W P-5'-P-CCNC: 24 U/L (ref 7–45)
ANION GAP SERPL CALC-SCNC: 13 MMOL/L (ref 10–20)
AST SERPL W P-5'-P-CCNC: 41 U/L (ref 9–39)
BILIRUB SERPL-MCNC: 0.5 MG/DL (ref 0–1.2)
BUN SERPL-MCNC: 19 MG/DL (ref 6–23)
CALCIUM SERPL-MCNC: 8.2 MG/DL (ref 8.6–10.6)
CHLORIDE SERPL-SCNC: 102 MMOL/L (ref 98–107)
CO2 SERPL-SCNC: 23 MMOL/L (ref 21–32)
CREAT SERPL-MCNC: 0.75 MG/DL (ref 0.5–1.05)
EGFRCR SERPLBLD CKD-EPI 2021: 87 ML/MIN/1.73M*2
ERYTHROCYTE [DISTWIDTH] IN BLOOD BY AUTOMATED COUNT: 15.6 % (ref 11.5–14.5)
GLUCOSE BLD MANUAL STRIP-MCNC: 138 MG/DL (ref 74–99)
GLUCOSE BLD MANUAL STRIP-MCNC: 142 MG/DL (ref 74–99)
GLUCOSE BLD MANUAL STRIP-MCNC: 143 MG/DL (ref 74–99)
GLUCOSE BLD MANUAL STRIP-MCNC: 151 MG/DL (ref 74–99)
GLUCOSE BLD MANUAL STRIP-MCNC: 156 MG/DL (ref 74–99)
GLUCOSE BLD MANUAL STRIP-MCNC: 167 MG/DL (ref 74–99)
GLUCOSE SERPL-MCNC: 133 MG/DL (ref 74–99)
HCT VFR BLD AUTO: 35.8 % (ref 36–46)
HGB BLD-MCNC: 10.1 G/DL (ref 12–16)
MAGNESIUM SERPL-MCNC: 2.89 MG/DL (ref 1.6–2.4)
MCH RBC QN AUTO: 24.9 PG (ref 26–34)
MCHC RBC AUTO-ENTMCNC: 28.2 G/DL (ref 32–36)
MCV RBC AUTO: 88 FL (ref 80–100)
NRBC BLD-RTO: 0.1 /100 WBCS (ref 0–0)
PLATELET # BLD AUTO: 298 X10*3/UL (ref 150–450)
POTASSIUM SERPL-SCNC: 5.1 MMOL/L (ref 3.5–5.3)
PROT SERPL-MCNC: 5.8 G/DL (ref 6.4–8.2)
RBC # BLD AUTO: 4.05 X10*6/UL (ref 4–5.2)
SODIUM SERPL-SCNC: 133 MMOL/L (ref 136–145)
WBC # BLD AUTO: 24.7 X10*3/UL (ref 4.4–11.3)

## 2025-07-30 PROCEDURE — 80053 COMPREHEN METABOLIC PANEL: CPT

## 2025-07-30 PROCEDURE — 97535 SELF CARE MNGMENT TRAINING: CPT | Mod: GO

## 2025-07-30 PROCEDURE — 82947 ASSAY GLUCOSE BLOOD QUANT: CPT

## 2025-07-30 PROCEDURE — 2500000004 HC RX 250 GENERAL PHARMACY W/ HCPCS (ALT 636 FOR OP/ED)

## 2025-07-30 PROCEDURE — 2500000001 HC RX 250 WO HCPCS SELF ADMINISTERED DRUGS (ALT 637 FOR MEDICARE OP)

## 2025-07-30 PROCEDURE — 2500000002 HC RX 250 W HCPCS SELF ADMINISTERED DRUGS (ALT 637 FOR MEDICARE OP, ALT 636 FOR OP/ED): Performed by: STUDENT IN AN ORGANIZED HEALTH CARE EDUCATION/TRAINING PROGRAM

## 2025-07-30 PROCEDURE — 97116 GAIT TRAINING THERAPY: CPT | Mod: GP

## 2025-07-30 PROCEDURE — 83735 ASSAY OF MAGNESIUM: CPT

## 2025-07-30 PROCEDURE — 36415 COLL VENOUS BLD VENIPUNCTURE: CPT

## 2025-07-30 PROCEDURE — 97530 THERAPEUTIC ACTIVITIES: CPT | Mod: GP

## 2025-07-30 PROCEDURE — 2500000004 HC RX 250 GENERAL PHARMACY W/ HCPCS (ALT 636 FOR OP/ED): Performed by: STUDENT IN AN ORGANIZED HEALTH CARE EDUCATION/TRAINING PROGRAM

## 2025-07-30 PROCEDURE — 2500000004 HC RX 250 GENERAL PHARMACY W/ HCPCS (ALT 636 FOR OP/ED): Performed by: NURSE PRACTITIONER

## 2025-07-30 PROCEDURE — 2500000002 HC RX 250 W HCPCS SELF ADMINISTERED DRUGS (ALT 637 FOR MEDICARE OP, ALT 636 FOR OP/ED)

## 2025-07-30 PROCEDURE — 99231 SBSQ HOSP IP/OBS SF/LOW 25: CPT

## 2025-07-30 PROCEDURE — 97165 OT EVAL LOW COMPLEX 30 MIN: CPT | Mod: GO

## 2025-07-30 PROCEDURE — 1200000003 HC ONCOLOGY  ROOM WITH TELEMETRY DAILY

## 2025-07-30 PROCEDURE — 85027 COMPLETE CBC AUTOMATED: CPT

## 2025-07-30 PROCEDURE — 97530 THERAPEUTIC ACTIVITIES: CPT | Mod: GO

## 2025-07-30 RX ORDER — TRAMADOL HYDROCHLORIDE 50 MG/1
50 TABLET, FILM COATED ORAL EVERY 8 HOURS PRN
Status: DISCONTINUED | OUTPATIENT
Start: 2025-07-30 | End: 2025-08-02 | Stop reason: HOSPADM

## 2025-07-30 RX ORDER — ACETAMINOPHEN 325 MG/1
650 TABLET ORAL
Status: DISCONTINUED | OUTPATIENT
Start: 2025-07-30 | End: 2025-08-02 | Stop reason: HOSPADM

## 2025-07-30 RX ORDER — METHOCARBAMOL 500 MG/1
500 TABLET, FILM COATED ORAL EVERY 8 HOURS SCHEDULED
Status: DISCONTINUED | OUTPATIENT
Start: 2025-07-30 | End: 2025-08-02 | Stop reason: HOSPADM

## 2025-07-30 RX ORDER — SCOPOLAMINE 1 MG/3D
1 PATCH, EXTENDED RELEASE TRANSDERMAL
Status: DISCONTINUED | OUTPATIENT
Start: 2025-07-30 | End: 2025-08-02 | Stop reason: HOSPADM

## 2025-07-30 RX ORDER — OXYCODONE HYDROCHLORIDE 5 MG/1
5 TABLET ORAL EVERY 6 HOURS PRN
Refills: 0 | Status: DISCONTINUED | OUTPATIENT
Start: 2025-07-30 | End: 2025-08-02 | Stop reason: HOSPADM

## 2025-07-30 RX ORDER — TRAMADOL HYDROCHLORIDE 50 MG/1
50 TABLET, FILM COATED ORAL EVERY 6 HOURS PRN
Qty: 28 TABLET | Refills: 0 | Status: SHIPPED | OUTPATIENT
Start: 2025-07-30 | End: 2025-08-01

## 2025-07-30 RX ORDER — DEXTROSE MONOHYDRATE AND SODIUM CHLORIDE 5; .45 G/100ML; G/100ML
50 INJECTION, SOLUTION INTRAVENOUS CONTINUOUS
Status: DISCONTINUED | OUTPATIENT
Start: 2025-07-30 | End: 2025-07-31

## 2025-07-30 RX ORDER — ACETAMINOPHEN 10 MG/ML
1000 INJECTION, SOLUTION INTRAVENOUS EVERY 6 HOURS
Status: DISCONTINUED | OUTPATIENT
Start: 2025-07-30 | End: 2025-07-30

## 2025-07-30 RX ADMIN — KETOROLAC TROMETHAMINE 15 MG: 15 INJECTION, SOLUTION INTRAMUSCULAR; INTRAVENOUS at 21:31

## 2025-07-30 RX ADMIN — METHOCARBAMOL 1000 MG: 100 INJECTION INTRAMUSCULAR; INTRAVENOUS at 00:53

## 2025-07-30 RX ADMIN — ACETAMINOPHEN 1000 MG: 10 INJECTION INTRAVENOUS at 14:49

## 2025-07-30 RX ADMIN — DEXTROSE AND SODIUM CHLORIDE 50 ML/HR: 5; .45 INJECTION, SOLUTION INTRAVENOUS at 18:11

## 2025-07-30 RX ADMIN — ACETAMINOPHEN 1000 MG: 10 INJECTION INTRAVENOUS at 05:35

## 2025-07-30 RX ADMIN — METOPROLOL TARTRATE 5 MG: 1 INJECTION, SOLUTION INTRAVENOUS at 23:10

## 2025-07-30 RX ADMIN — METHOCARBAMOL 500 MG: 500 TABLET ORAL at 21:30

## 2025-07-30 RX ADMIN — SCOPOLAMINE 1 PATCH: 1.5 PATCH, EXTENDED RELEASE TRANSDERMAL at 09:07

## 2025-07-30 RX ADMIN — TRAMADOL HYDROCHLORIDE 50 MG: 50 TABLET, COATED ORAL at 18:44

## 2025-07-30 RX ADMIN — ATORVASTATIN CALCIUM 10 MG: 10 TABLET, FILM COATED ORAL at 09:07

## 2025-07-30 RX ADMIN — SERTRALINE 50 MG: 50 TABLET, FILM COATED ORAL at 09:07

## 2025-07-30 RX ADMIN — ENOXAPARIN SODIUM 40 MG: 100 INJECTION SUBCUTANEOUS at 09:06

## 2025-07-30 RX ADMIN — METHOCARBAMOL 1000 MG: 100 INJECTION INTRAMUSCULAR; INTRAVENOUS at 09:06

## 2025-07-30 RX ADMIN — ACETAMINOPHEN 650 MG: 325 TABLET ORAL at 21:30

## 2025-07-30 RX ADMIN — ONDANSETRON 4 MG: 2 INJECTION INTRAMUSCULAR; INTRAVENOUS at 05:52

## 2025-07-30 RX ADMIN — METOPROLOL TARTRATE 5 MG: 1 INJECTION, SOLUTION INTRAVENOUS at 17:20

## 2025-07-30 RX ADMIN — INSULIN LISPRO 1 UNITS: 100 INJECTION, SOLUTION INTRAVENOUS; SUBCUTANEOUS at 12:34

## 2025-07-30 RX ADMIN — METOPROLOL TARTRATE 5 MG: 1 INJECTION, SOLUTION INTRAVENOUS at 10:19

## 2025-07-30 RX ADMIN — LEVOTHYROXINE SODIUM 25 MCG: 0.03 TABLET ORAL at 05:35

## 2025-07-30 RX ADMIN — INSULIN LISPRO 1 UNITS: 100 INJECTION, SOLUTION INTRAVENOUS; SUBCUTANEOUS at 09:07

## 2025-07-30 RX ADMIN — METHOCARBAMOL 500 MG: 500 TABLET ORAL at 17:21

## 2025-07-30 RX ADMIN — DEXTROSE, SODIUM CHLORIDE, AND POTASSIUM CHLORIDE 75 ML/HR: 5; .45; .15 INJECTION INTRAVENOUS at 05:35

## 2025-07-30 RX ADMIN — INSULIN LISPRO 1 UNITS: 100 INJECTION, SOLUTION INTRAVENOUS; SUBCUTANEOUS at 00:53

## 2025-07-30 RX ADMIN — METOPROLOL TARTRATE 5 MG: 1 INJECTION, SOLUTION INTRAVENOUS at 05:35

## 2025-07-30 RX ADMIN — KETOROLAC TROMETHAMINE 15 MG: 15 INJECTION, SOLUTION INTRAMUSCULAR; INTRAVENOUS at 14:49

## 2025-07-30 RX ADMIN — PANTOPRAZOLE SODIUM 20 MG: 40 INJECTION, POWDER, FOR SOLUTION INTRAVENOUS at 09:07

## 2025-07-30 RX ADMIN — KETOROLAC TROMETHAMINE 15 MG: 15 INJECTION, SOLUTION INTRAMUSCULAR; INTRAVENOUS at 09:06

## 2025-07-30 ASSESSMENT — COGNITIVE AND FUNCTIONAL STATUS - GENERAL
MOVING FROM LYING ON BACK TO SITTING ON SIDE OF FLAT BED WITH BEDRAILS: A LITTLE
CLIMB 3 TO 5 STEPS WITH RAILING: TOTAL
TURNING FROM BACK TO SIDE WHILE IN FLAT BAD: A LITTLE
CLIMB 3 TO 5 STEPS WITH RAILING: A LITTLE
DRESSING REGULAR UPPER BODY CLOTHING: A LITTLE
STANDING UP FROM CHAIR USING ARMS: A LITTLE
TURNING FROM BACK TO SIDE WHILE IN FLAT BAD: A LITTLE
MOBILITY SCORE: 18
MOBILITY SCORE: 18
WALKING IN HOSPITAL ROOM: A LITTLE
TOILETING: A LOT
WALKING IN HOSPITAL ROOM: A LITTLE
DAILY ACTIVITIY SCORE: 19
MOVING FROM LYING ON BACK TO SITTING ON SIDE OF FLAT BED WITH BEDRAILS: A LITTLE
PERSONAL GROOMING: A LITTLE
TOILETING: A LITTLE
MOVING TO AND FROM BED TO CHAIR: A LITTLE
HELP NEEDED FOR BATHING: A LOT
CLIMB 3 TO 5 STEPS WITH RAILING: A LITTLE
MOVING FROM LYING ON BACK TO SITTING ON SIDE OF FLAT BED WITH BEDRAILS: A LITTLE
WALKING IN HOSPITAL ROOM: A LITTLE
DRESSING REGULAR LOWER BODY CLOTHING: A LITTLE
TURNING FROM BACK TO SIDE WHILE IN FLAT BAD: A LITTLE
TOILETING: A LITTLE
HELP NEEDED FOR BATHING: A LITTLE
HELP NEEDED FOR BATHING: A LITTLE
STANDING UP FROM CHAIR USING ARMS: A LITTLE
DAILY ACTIVITIY SCORE: 16
DRESSING REGULAR UPPER BODY CLOTHING: A LITTLE
PERSONAL GROOMING: A LITTLE
DRESSING REGULAR LOWER BODY CLOTHING: A LOT
PERSONAL GROOMING: A LITTLE
STANDING UP FROM CHAIR USING ARMS: A LITTLE
DRESSING REGULAR LOWER BODY CLOTHING: A LITTLE
DAILY ACTIVITIY SCORE: 19
MOVING TO AND FROM BED TO CHAIR: A LITTLE
MOBILITY SCORE: 16
MOVING TO AND FROM BED TO CHAIR: A LITTLE
DRESSING REGULAR UPPER BODY CLOTHING: A LITTLE

## 2025-07-30 ASSESSMENT — PAIN - FUNCTIONAL ASSESSMENT
PAIN_FUNCTIONAL_ASSESSMENT: 0-10

## 2025-07-30 ASSESSMENT — PAIN SCALES - GENERAL
PAINLEVEL_OUTOF10: 0 - NO PAIN
PAINLEVEL_OUTOF10: 4
PAINLEVEL_OUTOF10: 0 - NO PAIN
PAINLEVEL_OUTOF10: 1
PAINLEVEL_OUTOF10: 7
PAINLEVEL_OUTOF10: 6

## 2025-07-30 ASSESSMENT — ACTIVITIES OF DAILY LIVING (ADL)
BATHING_ASSISTANCE: MODERATE
EFFECT OF PAIN ON DAILY ACTIVITIES: DISCOMFORT
ADL_ASSISTANCE: INDEPENDENT
HOME_MANAGEMENT_TIME_ENTRY: 14

## 2025-07-30 ASSESSMENT — PAIN DESCRIPTION - LOCATION: LOCATION: ABDOMEN

## 2025-07-30 ASSESSMENT — PAIN DESCRIPTION - DESCRIPTORS: DESCRIPTORS: ACHING;DISCOMFORT

## 2025-07-30 NOTE — HOSPITAL COURSE
69 yr old female w h/o HLD, HTN, CHF, NSTEMI s/p PCI (2007), hypothyroid, endometrial Ca s/p hysterectomy, CKD now s/p DPS on 7/28 with Dr Fong for pancreatic mass.  Transferred to [facility] post-op.  Post-op course uncomplicated.  Hudson removed POD 2 and passed TOV.  Diet advanced slowly as tolerated.  IV medication transitioned to oral with adequate PO intake.  L surgical drain removed POD 3. Maintain R surgical drain.  DC to acute rehab POD # on extended DVT prophylaxis. Post-splenectomy vaccines given 8/1.  Will follow up in outpatient clinic on 8/8.

## 2025-07-30 NOTE — PROGRESS NOTES
Postop Pain HPI -   Palliative: relieved with IV analgesics and regional local anesthetics  Provocative: movement  Quality:  burning and aching  Radiation:  none  Severity:  4/10  Timing: constant    24-HOUR OPIOID CONSUMPTION:  Dilaudid 0.8mg     Scheduled medications  Scheduled Medications[1]  Continuous medications  Continuous Medications[2]  PRN medications  PRN Medications[3]     Physical Exam:  Constitutional:  no distress, alert and cooperative  Eyes: clear sclera  Head/Neck: No apparent injury, trachea midline  Respiratory/Thorax: Patent airways, thorax symmetric, breathing comfortably  Cardiovascular: no pitting edema  Gastrointestinal: Nondistended  Musculoskeletal: ROM intact  Extremities: no clubbing  Neurological: alert, hinojosa x4  Psychological: Appropriate affect    Results for orders placed or performed during the hospital encounter of 07/28/25 (from the past 24 hours)   POCT GLUCOSE   Result Value Ref Range    POCT Glucose 166 (H) 74 - 99 mg/dL   POCT GLUCOSE   Result Value Ref Range    POCT Glucose 151 (H) 74 - 99 mg/dL   POCT GLUCOSE   Result Value Ref Range    POCT Glucose 141 (H) 74 - 99 mg/dL   POCT GLUCOSE   Result Value Ref Range    POCT Glucose 167 (H) 74 - 99 mg/dL   POCT GLUCOSE   Result Value Ref Range    POCT Glucose 143 (H) 74 - 99 mg/dL   POCT GLUCOSE   Result Value Ref Range    POCT Glucose 156 (H) 74 - 99 mg/dL        Plan:    - Bilateral KANDI catheter blocks performed preoperatively on 7/28/25  - Ambit ball with Ropivacaine 0.2%/NaCl 0.9% 500mL, Rate 7 cc/hr bilaterally  - Please notify Acute Pain service before initiation of anticoagulation and/or dual antiplatelet therapy if patient has indwelling nerve block catheter(s) in place  - Ambit medication will not interfere with pain medication prescribed by the primary team.   - Please be aware of local anesthetic toxic dose and absorption variability before considering lidocaine patches  - Acute pain service will follow while catheters in  place  - Will plan to pull catheters at 2100 prior to lovenox dosing and sign off  - Rest of pain management per primary team    Acute Pain Resident  pg [phone] ph [phone]       [1] acetaminophen, 1,000 mg, intravenous, q6h  atorvastatin, 10 mg, oral, Daily  [Held by provider] cetirizine, 10 mg, oral, Daily  enoxaparin, 40 mg, subcutaneous, q24h  [START ON 8/1/2025] pneumoc 20-braeden conj-dip cr(PF), 0.5 mL, intramuscular, Once   And  [START ON 8/1/2025] mening vac A,C,Y,W135 dip (PF), 0.5 mL, intramuscular, Once   And  [START ON 8/1/2025] meningococcal B vaccine,4-comp, 0.5 mL, intramuscular, Once   And  [START ON 8/1/2025] haemophilus b conjugate, 0.5 mL, intramuscular, Once  insulin lispro, 0-5 Units, subcutaneous, q4h  ketorolac, 15 mg, intravenous, q6h  levothyroxine, 25 mcg, oral, Daily  methocarbamol, 1,000 mg, intravenous, q8h  [Held by provider] metoprolol succinate XL, 25 mg, oral, q12h OSCAR  metoprolol, 5 mg, intravenous, q6h  pantoprazole, 20 mg, intravenous, Daily  [Held by provider] sacubitriL-valsartan, 1 tablet, oral, BID  scopolamine, 1 patch, transdermal, q72h  sertraline, 50 mg, oral, Daily  [2] potassium iuomjrr-C1-6.45%NaCl, 75 mL/hr, Last Rate: 75 mL/hr (07/30/25 0535)  HYDROmorphone,   ropivacaine (PF) in NS cmpd, 14 mL/hr, Last Rate: 14 mL/hr (07/29/25 1100)  [3] PRN medications: dextrose, dextrose, glucagon, naloxone, ondansetron ODT **OR** ondansetron

## 2025-07-30 NOTE — PROGRESS NOTES
Surgical Oncology Progress Note      07/30/25      Dayan Lopez is a 69F w h/o HLD, HTN, CHF, NSTEMI s/p PCI (2007), hypothyroid, endometrial Ca s/p hysterectomy, CKD. Here for pancreas bx suspicious for malignancy s/p DPS 7/28. POD1.    Subjective   NAEON. Pain is very well controlled. Patient seen and evaluated by team at bedside this AM. Patient has been using her PCA frequently because she was told that she was able to, not because of pain. Not passing gas yet. Endorses feeling sleepy throughout the day. Patient endorsed burping/nausea this morning, relieved by Zofran. Patient was encouraged to limit PCA use for pain management only.       Review of Systems:    A 12-point review of systems was performed, and was negative except as above.    Objective    Objective:  Vital signs:   Temp:  [35.8 °C (96.5 °F)-37.4 °C (99.3 °F)] 36.4 °C (97.5 °F)  Heart Rate:  [57-98] 84  Resp:  [18] 18  BP: ()/(45-76) 117/66    Physical Exam:  GEN: No acute distress. Alert, awake and conversant.  HEENT: Sclera anicteric. Moist mucous membranes.  RESP: Breathing non-labored, equal chest rise. On 1LNC.  CV: Regular rate, normotensive  GI: Abdomen soft, nondistended, non-tender. Midline wound with dressing intact, small amounts of strikethrough on inferior portion of dressing consistent from yesterday evening. L and R drain sites with 4x4 over top; drains SS bilaterally.  : Hudson in place with clear urine.  MSK: No gross deformities. Moves all extremities spontaneously.  NEURO: Alert and oriented x3. No focal deficits.  PSYCH: Appropriate mood and affect.  SKIN: No rashes or lesions.    I/O last 2 completed shifts:  In: 2462 (26.1 mL/kg) [P.O.:600; I.V.:923 (9.8 mL/kg); IV Piggyback:939]  Out: 948 (10 mL/kg) [Urine:900 (0.4 mL/kg/hr); Drains:48]  Weight: 94.4 kg      Labs Past 18 Hours:  Recent Results (from the past 18 hours)   POCT GLUCOSE    Collection Time: 07/29/25  8:16 PM   Result Value Ref Range    POCT  Glucose 141 (H) 74 - 99 mg/dL   POCT GLUCOSE    Collection Time: 07/30/25 12:50 AM   Result Value Ref Range    POCT Glucose 167 (H) 74 - 99 mg/dL   POCT GLUCOSE    Collection Time: 07/30/25  3:56 AM   Result Value Ref Range    POCT Glucose 143 (H) 74 - 99 mg/dL   POCT GLUCOSE    Collection Time: 07/30/25  8:09 AM   Result Value Ref Range    POCT Glucose 156 (H) 74 - 99 mg/dL      Meds:  Current Medications[1]     Imaging:  Imaging  No results found.    Cardiology, Vascular, and Other Imaging  No other imaging results found for the past 2 days      No pertinent imaging to review.    Medications reviewed.  Vital signs reviewed.  Labs reviewed.         Assessment/Plan    Assessment and Plan:  Dayan Lopez is a [place]  69F w h/o HLD, HTN, CHF, NSTEMI s/p PCI (2007), hypothyroid, endometrial Ca s/p hysterectomy, CKD. Here for pancreas bx suspicious for malignancy s/p DPS 7/28, POD2. Patient is in stable condition and unchanged from previous exam. Doing well postoperatively.    Plan Today:   Neuro:   - Will discontinue PCA. Multimodal pain regimen, switching to oral medications. PO Tylenol, PO robaxin, PO oxy for moderate pain, tramadol for severe pain. Ropivacaine gtt per anesthesia management.  - Continue home sertraline for anxiety.    CV:   - Continue metoprolol 5mg IV Q6hrs, hold parameters in place.  - Continue home lipitor.   - Hold home entresto.     Pulm:   - Wean O2 as able.   - Encourage IS, OOB, and ambulation.    GI:   - Continue limited CLD.   - Continue daily PPI.  - Continue Zofran PRN, Scopolamine patch.  - F/U pathology results.   - Ordered L drain amylase for 7/31 AM.    Renal:   - Continue mIVF D5W 0.45% NaCl 20mEq KCl at 75Ml/hr.  - Hold home Farxiga until discharge.  - Monitor Bun/Cr, UOP.  - Remove banks today AM, will follow up with void trial    Endo:   - Continue home synthroid 25 mcg.   - Glucose well controled. Will monitor glucose, on Lispro SSI #1.    Heme/ID:   - remains afebrile, no  current abx    MSK:   - continue PT/OT, OOB, encourage ambulation  - continue ppx with Lovenox 40mg, SCDs    Dispo: Hospital Day: 3. Continue current level of care     Note written by BANDAR BAKER, Acting intern, MS4      Agree with plan as above.   Patient seen and discussed with attending Dr. Fong.     Jackie Leonard MD  PGY-1 General Surgery  Surgical Oncology [id number]            [1]   Current Facility-Administered Medications:     atorvastatin (Lipitor) tablet 10 mg, 10 mg, oral, Daily, Jackie Leonard MD, 10 mg at 07/30/25 0907    [Held by provider] cetirizine (ZyrTEC) tablet 10 mg, 10 mg, oral, Daily, Kim Jewell MD    dextrose 5 % and sodium chloride 0.45 % with KCl 20 mEq/L infusion, 75 mL/hr, intravenous, Continuous, Dasia Evans, APRN-CNP, Last Rate: 75 mL/hr at 07/30/25 0535, 75 mL/hr at 07/30/25 0535    dextrose 50 % injection 12.5 g, 12.5 g, intravenous, q15 min PRN, Kim Jewell MD    dextrose 50 % injection 25 g, 25 g, intravenous, q15 min PRN, Kim Jewell MD    enoxaparin (Lovenox) syringe 40 mg, 40 mg, subcutaneous, q24h, Kim Jewell MD, 40 mg at 07/30/25 0906    glucagon (Glucagen) injection 1 mg, 1 mg, intramuscular, q15 min PRN, Kim Jewell MD    [START ON 8/1/2025] pneumococcal conjugate 20-valent (PREVNAR 20) vaccine, 0.5 mL, intramuscular, Once **AND** [START ON 8/1/2025] mening vac A,C,Y,W135 dip (PF) (MENVEO) 10-5 mcg/0.5 mL injection 0.5 mL, 0.5 mL, intramuscular, Once **AND** [START ON 8/1/2025] meningococcal B vaccine,4-comp (Bexsero) vaccine 0.5 mL, 0.5 mL, intramuscular, Once **AND** [START ON 8/1/2025] haemophilus b conjugate (ActHIB) 10 mcg/0.5 mL vaccine 0.5 mL, 0.5 mL, intramuscular, Once, Dasia Evans, APRN-CNP    hydromorphone PCA 0.5 mg/mL in NS opioid naive, , intravenous, Continuous, Kim Jewell MD, Rate Verify at 07/29/25 0327    insulin lispro injection 0-5 Units, 0-5 Units, subcutaneous, q4h, Kim Jewell MD,  1 Units at 07/30/25 0907    ketorolac (Toradol) injection 15 mg, 15 mg, intravenous, q6h, ERIC Green-CNP, 15 mg at 07/30/25 0906    levothyroxine (Synthroid, Levoxyl) tablet 25 mcg, 25 mcg, oral, Daily, Jackie Leonard MD, 25 mcg at 07/30/25 0535    methocarbamol (Robaxin) injection 1,000 mg, 1,000 mg, intravenous, q8h, ERIC Green-CNP, 1,000 mg at 07/30/25 0906    [Held by provider] metoprolol succinate XL (Toprol-XL) 24 hr tablet 25 mg, 25 mg, oral, q12h OSCAR, Kim Jewell MD    metoprolol tartrate (Lopressor) injection 5 mg, 5 mg, intravenous, q6h, Jackie Leonard MD, 5 mg at 07/30/25 1019    naloxone (Narcan) injection 0.2 mg, 0.2 mg, intravenous, PRN, Kim Jewell MD    ondansetron ODT (Zofran-ODT) disintegrating tablet 4 mg, 4 mg, oral, q8h PRN **OR** ondansetron (Zofran) injection 4 mg, 4 mg, intravenous, q8h PRN, Kim Jewell MD, 4 mg at 07/30/25 0552    pantoprazole (Protonix) injection 20 mg, 20 mg, intravenous, Daily, Kim Jewell MD, 20 mg at 07/30/25 0907    ropivacaine (PF) in NS cmpd (Naropin) 1000 mg/500 mL (0.2%) infusion, 14 mL/hr, infiltration, Continuous, Monica Gordon MD, Last Rate: 14 mL/hr at 07/29/25 1100, 14 mL/hr at 07/29/25 1100    [Held by provider] sacubitriL-valsartan (Entresto) 49-51 mg per tablet 1 tablet, 1 tablet, oral, BID, Kim Jewell MD    scopolamine (Transderm-Scop) patch 1 patch, 1 patch, transdermal, q72h, Jackie Leonard MD, 1 patch at 07/30/25 0907    sertraline (Zoloft) tablet 50 mg, 50 mg, oral, Daily, Jackie Leonard MD, 50 mg at 07/30/25 0907

## 2025-07-30 NOTE — PROGRESS NOTES
Occupational Therapy    Evaluation and Treatment    Patient Name: Dayan Lopez  MRN: [id number]  Today's Date: 7/30/2025  Room: [address] [place], [place] [address]  Time Calculation  Start Time: 1052  Stop Time: 1136  Time Calculation (min): 44 min    Assessment  IP OT Assessment  OT Assessment: Pt's abilty to complete ADLs currently limited by deficits in functional strength, cognition, activity tolerance, and dynamic balance. The pt demos the ability to complete the majority of ADL tasks with Mod A - SBA and performs functional transfers with Min A - Mod A using FWW. Pt will benefit from continued OT while admitted to increase IND and safety prior to d/c.  Prognosis: Good  Barriers to Discharge Home: Physical needs, Cognition needs  Cognition Needs: Cognition-related high falls risk, Insight of patient limited regarding functional ability/needs  Physical Needs: 24hr mobility assistance needed, Intermittent ADL assistance needed, High falls risk due to function or environment, In-home setup navigation limited by function/safety  Evaluation/Treatment Tolerance: Patient tolerated treatment well  Medical Staff Made Aware: Yes  End of Session Communication: Bedside nurse  End of Session Patient Position: Up in chair, Alarm on  Plan:  Inpatient Plan  Treatment Interventions: ADL retraining, Functional transfer training, UE strengthening/ROM, Endurance training, Cognitive reorientation, Patient/family training, Equipment evaluation/education, Compensatory technique education  OT Frequency: 4 times per week (Based on current functional status and rehab potential, patient is anticipated to tolerate and benefit from 5 or more days per week of skilled rehabilitative therapy after discharge from this acute inpatient hospitalization.)  OT Discharge Recommendations: High intensity level of continued care (May progress to low with improved tolerance and functional IND)  Equipment Recommended upon Discharge:  (None)  OT Recommended Transfer Status:  "Moderate assist, Assist of 1  OT - OK to Discharge: Yes  OT Assessment  OT Assessment Results: Decreased ADL status, Decreased safe judgment during ADL, Decreased cognition, Decreased endurance, Decreased functional mobility, Decreased IADLs  Prognosis: Good  Barriers to Discharge:  (CLOF)  Evaluation/Treatment Tolerance: Patient tolerated treatment well  Medical Staff Made Aware: Yes  Strengths: Attitude of self, Support of Caregivers, Housing layout  Barriers to Participation: Comorbidities    Subjective   Current Problem:  1. Pancreatic mass (HHS-HCC)  Surgical Pathology Exam    Surgical Pathology Exam    traMADol (Ultram) 50 mg tablet    Referral to Home Health    Walker rolling    CANCELED: Walker rolling      2. Surgery, elective  chlorhexidine (Hibiclens) 4 % external liquid    naloxone (Narcan) 4 mg/0.1 mL nasal spray    enoxaparin (Lovenox) 40 mg/0.4 mL syringe    acetaminophen (Tylenol 8 HOUR) 650 mg ER tablet    methocarbamol (Robaxin) 500 mg tablet      3. Hypothyroidism (acquired)  levothyroxine (Synthroid, Levoxyl) 25 mcg tablet        General:  Reason for Referral: Pancreatic mass s/p distal pancreatectomy and splenectomy  Past Medical History Relevant to Rehab: HTN, HLD, NSTEMI s/p PCI in 2007, CHF, endometrial CA s/p hysterectomy, CKD  Prior to Session Communication: Bedside nurse  Patient Position Received: Bed, 3 rail up, Alarm on  Family/Caregiver Present: Yes  Caregiver Feedback: Spouse arrives during eval  General Comment: Pt pleasant and agreeable to OT eval  -reports \"I am so tired, I can't sleep here.\"   Precautions:  Medical Precautions: Abdominal precautions, Fall precautions, Oxygen therapy device and L/min (2L O 2)  Post-Surgical Precautions: Abdominal surgery precautions  Vital Signs:   Date/Time Vitals Session Patient Position Pulse Resp SpO2 BP MAP (mmHg)    07/30/25 1232 --  --  73  18  99 %  117/61  74      Pain:  Pain Assessment  Pain Assessment: 0-10  0-10 (Numeric) Pain Score: 0 " "- No pain  Lines/Tubes/Drains:  Closed/Suction Drain Right RLQ Other (Comment) (Active)   Number of days: 1       Closed/Suction Drain Left LLQ (Active)   Number of days: 1   Objective   Cognition:  Overall Cognitive Status: Impaired  Arousal/Alertness: Appropriate responses to stimuli  Orientation Level: Oriented X4 (Cues for day of the week)  Following Commands: Follows one step commands with repetition  Safety Judgment: Decreased awareness of need for safety precautions  Cognition Comments: Scores an 18/30 on the MoCA indicating mild impairment - score breakdown in TA and outcomes  - pt benefits from repeated commands throughout and cues for sequencing daily tasks  Memory: Exceptions to WFL  Short-Term Memory: Impaired  Working Memory: Impaired  Safety/Judgement: Exceptions to WFL  Complex Functional Tasks: Minimal  Novel Situations: Minimal  Routine Tasks: Minimal  Insight: Mild  Impulsive: Mildly   Home Living:  Type of Home: House  Lives With: Spouse  Home Adaptive Equipment: Walker rolling or standard  Home Layout: Two level, Laundry main level  Home Access: Stairs to enter with rails  Entrance Stairs-Rails: Right  Entrance Stairs-Number of Steps: 3  Bathroom Shower/Tub: Walk-in shower  Bathroom Toilet: Handicapped height  Bathroom Equipment: Built-in shower seat, Bedside commode  Home Living Comments: Initially reports not having a shower chair, endorses having a bedside commode, but states \"you know the one that sits in the shower that you can slide across\"   Prior Function:  Level of [place]: Independent with ADLs and functional transfers, Independent with homemaking with ambulation  ADL Assistance: Independent  Homemaking Assistance: Independent  Ambulatory Assistance: Independent  Vocational: Retired  Leisure: Shop and romo  Hand Dominance: Right  Prior Function Comments: - falls  IADL History:  Homemaking Responsibilities: Yes  Meal Prep Responsibility: Primary  Laundry Responsibility: " Primary  Cleaning Responsibility: Primary  Bill Paying/Finance Responsibility: Primary  Shopping Responsibility: Primary  Homemaking Comments: Shares with spouse  Current License: Yes  Mode of Transportation: Car  Occupation: Retired  Leisure and Hobbies: Shop and romo  ADL:  Eating Assistance: Independent (Anticipated)  Grooming Assistance: Minimal  Bathing Assistance: Moderate (Anticipated)  UE Dressing Assistance: Minimal  LE Dressing Assistance: Moderate  Toileting Assistance with Device: Moderate (Anticipated)  ADL Comments: Limited by safety concerns and fatigue  Activity Tolerance:  Endurance: Tolerates 10 - 20 min exercise with multiple rests  Balance:  Dynamic Standing Balance  Dynamic Standing-Balance Support: Bilateral upper extremity supported  Dynamic Standing-Level of Assistance: Moderate assistance  Dynamic Standing-Comments: FWW  Static Sitting Balance  Static Sitting-Balance Support: No upper extremity supported, Feet supported  Static Sitting-Level of Assistance: Close supervision  Static Sitting-Comment/Number of Minutes: EOB  Static Standing Balance  Static Standing-Balance Support: Bilateral upper extremity supported  Static Standing-Level of Assistance: Contact guard  Static Standing-Comment/Number of Minutes: FWW  Bed Mobility/Transfers: Bed Mobility/Transfers: Bed Mobility  Bed Mobility: Yes  Bed Mobility 1  Bed Mobility 1: Supine to sitting  Level of Assistance 1: Minimum assistance  Bed Mobility Comments 1: Education on log roll, cues for follow through   and Transfers  Transfer: Yes  Transfer 1  Transfer From 1: Sit to, Stand to  Transfer to 1: Stand, Sit  Technique 1: Sit to stand, Stand to sit  Transfer Device 1: Walker  Transfer Level of Assistance 1: Minimum assistance  Trials/Comments 1: Cues for hand placement, Min A for lifting to stand  Transfers 2  Transfer From 2: Bed to  Transfer to 2: Chair with arms  Technique 2: Stand pivot  Transfer Device 2: Walker  Transfer Level of  Assistance 2: Moderate assistance, Moderate verbal cues  Trials/Comments 2: 2x LOB requiring Mod A for recovery  IADL's:   Homemaking Responsibilities: Yes  Meal Prep Responsibility: Primary  Laundry Responsibility: Primary  Cleaning Responsibility: Primary  Bill Paying/Finance Responsibility: Primary  Shopping Responsibility: Primary  Homemaking Comments: Shares with spouse  Current License: Yes  Mode of Transportation: Car  Occupation: Retired  Leisure and Hobbies: Shop and romo  Vision: Vision - Basic Assessment  Current Vision: Wears glasses all the time   and    Sensation:  Light Touch: No apparent deficits  Strength:  Strength Comments: B UEs appear WFL, not resisted 2/2 ABD precautions  Perception:  Inattention/Neglect: Appears intact  Coordination:  Movements are Fluid and Coordinated: Yes   Hand Function:  Hand Function  Gross Grasp: Functional  Coordination: Functional  Extremities:   RUE   RUE : Within Functional Limits, LUE   LUE: Within Functional Limits,  , and    Outcome Measures: [facility] Daily Activity  Putting on and taking off regular lower body clothing: A lot  Bathing (including washing, rinsing, drying): A lot  Putting on and taking off regular upper body clothing: A little  Toileting, which includes using toilet, bedpan or urinal: A lot  Taking care of personal grooming such as brushing teeth: A little  Eating Meals: None  Daily Activity - Total Score: 16         , MoCA  Visuospatial/Executive: 3  Naming: 3  Memory (Score '0' as this is an Unscored Section): 0  Attention: Read List of Digits: 2  Attention: Read List of Letters: 1  Attention: Serial Sevens: 1  Language: Repeat: 1  Language: Fluency: 0  Abstraction: 2  Delayed Recall: 0  Orientation: 5  Add 1 Point if </=12 yr Education: 0  MOCA Total Score: 18   OT Adult Other Outcome Measures  MOCA Total Score: 18    Education Documentation  Body Mechanics, taught by Jeff Marshall OT at 7/30/2025  1:06 PM.  Learner: Significant Other,  Patient  Readiness: Acceptance  Method: Explanation, Demonstration  Response: Verbalizes Understanding, Needs Reinforcement  Comment: ADLs and safety    Precautions, taught by Jeff Marshall OT at 7/30/2025  1:06 PM.  Learner: Significant Other, Patient  Readiness: Acceptance  Method: Explanation, Demonstration  Response: Verbalizes Understanding, Needs Reinforcement  Comment: ADLs and safety    ADL Training, taught by Jeff Marshall OT at 7/30/2025  1:06 PM.  Learner: Significant Other, Patient  Readiness: Acceptance  Method: Explanation, Demonstration  Response: Verbalizes Understanding, Needs Reinforcement  Comment: ADLs and safety    Education Comments  No comments found.    Goals:   Encounter Problems       Encounter Problems (Active)       ADLs       Patient with complete lower body dressing with SUP level of assistance donning and doffing all LE clothes  with PRN adaptive equipment while edge of bed  and standing (Progressing)       Start:  07/30/25    Expected End:  08/20/25            Patient will complete daily grooming tasks brushing teeth and washing face/hair with independent level of assistance and PRN adaptive equipment while edge of bed . (Progressing)       Start:  07/30/25    Expected End:  08/20/25            Patient will complete toileting including hygiene clothing management/hygiene with supervision level of assistance and raised toilet seat and grab bars. (Progressing)       Start:  07/30/25    Expected End:  08/20/25               BALANCE       Pt will maintain dynamic standing balance during ADL task with supervision level of assistance in order to demonstrate decreased risk of falling and improved postural control. (Progressing)       Start:  07/30/25    Expected End:  08/20/25               COGNITION/SAFETY       Patient will score WFL on standardized cognitive assessment with Min cues and within reasonable time frame (Progressing)       Start:  07/30/25    Expected End:  08/20/25             "Patient will follow 100% Simple commands to allow improved ADL performance. (Progressing)       Start:  07/30/25    Expected End:  08/20/25            Patient will demonstrated orientation x 4 with visual cues. (Progressing)       Start:  07/30/25    Expected End:  08/20/25       ORIENTATION         Pt will complete standing functional activity while demonstrating good safety awareness with SUP.   (Progressing)       Start:  07/30/25    Expected End:  08/20/25               TRANSFERS       Patient will complete functional transfers with least restrictive device with supervision level of assistance. (Progressing)       Start:  07/30/25    Expected End:  08/20/25                   Treatment Completed on Evaluation  Activities of Daily Living: Toilet Transfers  Toilet Transfers Comments: Attempted transfer to toilet, pt with high levels of instability and is currently unsafe for ambulation into bathroom, recommending BSC for use at this time in hospital until progressed. RN aware.  Grooming  Grooming Level of Assistance: Minimum assistance  Grooming Where Assessed: Chair  Grooming Comments: Min A for completion of oral hygiene and face washing, pt requires cues for sequencing task and asks questions such as \"what is this, toothpast?\" while holding toothpaste container. She requires increased time to complete   UE Dressing  UE Dressing Level of Assistance: Minimum assistance  UE Dressing Where Assessed: Edge of bed  UE Dressing Comments: Min A for management of gown around backside and to fasten in back, cues for increased IND   Therapy/Activity:     Therapeutic Activity  Therapeutic Activity Performed: Yes  Therapeutic Activity 1: Functional transfer bed to chair - pt stands to FWW with Min A, upon standing, pt attempts to take steps to bathroom and looses balance requiring Mod A for recovery. Due to safety concerns, pt redirected to transfer to chair. Pt looses balance again while turning to chair requiring Min/Mod A " for recovery. Cues throughout for improved safety with fair follow through  Therapeutic Activity 2: Sit to stand x2 with Min A to FWW, cues for hand placement and positioning  Therapeutic Activity 3: Pt engaged in MoCA to assess functional cognition and safety for d/c planning. Pt provided good effort and earned a score of 18/30 indicating mild cognitive impairment. She makes errors in executive functioning, serial subtraction, language, language fluency, immediate recall, and delayed recall. Recommending increased SUP upon d/c and assist with ADLs/IADLs as needed.   07/30/25 at 1:07 PM   Jeff Marshall OT   Rehab Office: [phone]

## 2025-07-30 NOTE — PROGRESS NOTES
Physical Therapy    Physical Therapy Treatment    Patient Name: Dayan Lopez  MRN: [id number]  Department: [facility]  Room: [address] [place] [address]  Today's Date: 7/30/2025  Time Calculation  Start Time: 1214  Stop Time: 1248  Time Calculation (min): 34 min    Assessment/Plan   PT Assessment  PT Assessment Results: Decreased strength, Decreased range of motion, Decreased endurance, Impaired balance, Decreased mobility, Decreased cognition, Impaired judgement, Decreased safety awareness, Orthopedic restrictions, Pain  Rehab Prognosis: Good  Barriers to Discharge Home: Caregiver assistance, Physical needs  Caregiver Assistance: Caregiver assistance needed per identified barriers - however, level of patient's required assistance exceeds assistance available at home  Physical Needs: Stair navigation into home limited by function/safety, Ambulating household distances limited by function/safety, Intermittent mobility assistance needed, Intermittent ADL assistance needed, High falls risk due to function or environment  Evaluation/Treatment Tolerance: Patient limited by fatigue, Patient limited by pain  Medical Staff Made Aware: Yes  Strengths: Attitude of self, Coping skills  Barriers to Participation: Comorbidities  End of Session Communication: Bedside nurse  Assessment Comment: Pt presented with an increased unsteady gait using the wheeled walker making her a higher falls risk and would better benefit from high intensity level therapy after d/c to improve safe mobility.  End of Session Patient Position: Bed, 3 rail up, Alarm off, caregiver present  PT Plan  Inpatient/Swing Bed or Outpatient: Inpatient  PT Plan  Treatment/Interventions: Bed mobility, Transfer training, Gait training, Stair training, Balance training, Strengthening, Endurance training, Range of motion, Therapeutic exercise, Therapeutic activity, Home exercise program  PT Plan: Ongoing PT  PT Frequency: 5 times per week  PT Discharge Recommendations: High intensity  level of continued care  Equipment Recommended upon Discharge: Wheeled walker  PT Recommended Transfer Status: Assist x1  PT - OK to Discharge: Yes    PT Visit Info:  PT Received On: 07/30/25  Response to Previous Treatment: Patient reporting fatigue but able to participate.     General Visit Information:   General  Reason for Referral: Pancreatic mass s/p distal pancreatectomy and splenectomy  Past Medical History Relevant to Rehab: HTN, HLD, NSTEMI s/p PCI in 2007, CHF, endometrial CA s/p hysterectomy, CKD  Caregiver Feedback:  present with good support.  Prior to Session Communication: Bedside nurse  Patient Position Received: Up in chair, Alarm on  Preferred Learning Style: auditory, verbal, visual, written  General Comment: Pt sitting in chair upon arrival, pleasant, cooperative and willing to participate in therapy.    Subjective   Precautions:  Precautions  Hearing/Visual Limitations: Hearing and vision WFL with glasses.  Medical Precautions: Abdominal precautions, Fall precautions, Oxygen therapy device and L/min  Post-Surgical Precautions: Abdominal surgery precautions  Precautions Comment: Pt in compliance with precautions throughout therapy session.     Date/Time Vitals Session Patient Position Pulse Resp SpO2 BP MAP (mmHg)    07/30/25 1214 Pre PT  Sitting  76  --  94 %  117/61  --     07/30/25 1232 --  --  73  18  99 %  117/61  74      Vital Signs Comment: vitals stable     Objective   Pain:  Pain Assessment  Pain Assessment: 0-10  0-10 (Numeric) Pain Score: 1  Pain Type: Acute pain, Surgical pain  Pain Location: Abdomen  Pain Orientation: Lower  Pain Radiating Towards: whole abdomen  Pain Descriptors: Aching, Discomfort  Pain Frequency: Constant/continuous  Pain Onset: Ongoing  Clinical Progression: Not changed  Effect of Pain on Daily Activities: Discomfort  Patient's Stated Pain Goal: No pain  Pain Interventions: Therapeutic presence, Ambulation/increased activity  Response to Interventions: No  change in pain  Cognition:  Cognition  Overall Cognitive Status: Impaired  Arousal/Alertness: Appropriate responses to stimuli  Orientation Level: Oriented X4  Following Commands: Follows one step commands with repetition  Safety Judgment: Decreased awareness of need for safety precautions  Problem Solving: Assistance required to identify errors made  Cognition Comments: normal  Attention: Within Functional Limits  Coordination:  Movements are Fluid and Coordinated: Yes  Coordination Comment: WFL  Postural Control:  Postural Control  Postural Control: Within Functional Limits  Posture Comment: Pt presented with good sitting posture and good standing posture using a wheeled walker.  Static Sitting Balance  Static Sitting-Balance Support: Bilateral upper extremity supported, Feet supported  Static Sitting-Level of Assistance: Close supervision  Static Sitting-Comment/Number of Minutes: Sitting EOB  Dynamic Sitting Balance  Dynamic Sitting-Balance Support: Bilateral upper extremity supported, Feet supported  Dynamic Sitting-Level of Assistance: Close supervision  Dynamic Sitting-Comments: Sitting EOB  Static Standing Balance  Static Standing-Balance Support: Bilateral upper extremity supported  Static Standing-Level of Assistance: Minimum assistance  Static Standing-Comment/Number of Minutes: using the wheeled walker  Dynamic Standing Balance  Dynamic Standing-Balance Support: Bilateral upper extremity supported  Dynamic Standing-Level of Assistance: Minimum assistance  Dynamic Standing-Comments: using the wheeled walker  Extremity/Trunk Assessments:  Cervical Spine   Cervical Spine: Within Functional Limits  Lumbar Spine   Lumbar Spine : Exceptions to Funtional Limits  Lumbar Spine Comment: Decreased strength and ROM.    RUE   RUE : Within Functional Limits  LUE   LUE: Within Functional Limits  RLE   RLE : Exceptions to WFL  AROM RLE (degrees)  RLE AROM Comment: WFL  Strength RLE  R Hip Flexion: 3+/5  R Knee Flexion:  4/5  R Knee Extension: 4/5  R Ankle Dorsiflexion: 5/5  R Ankle Plantar Flexion: 5/5  LLE   LLE : Exceptions to WFL  AROM LLE (degrees)  LLE AROM Comment: WFL  Strength LLE  L Hip Flexion: 3+/5  L Knee Flexion: 4/5  L Knee Extension: 4/5  L Ankle Dorsiflexion: 5/5  L Ankle Plantar Flexion: 5/5  Activity Tolerance:  Activity Tolerance  Endurance: Decreased tolerance for upright activites  Treatments:  Therapeutic Exercise  Therapeutic Exercise Performed: No    Therapeutic Activity  Therapeutic Activity Performed: Yes  Therapeutic Activity 1: Pt performed gait training using the wheeled walker.    Balance/Neuromuscular Re-Education  Balance/Neuromuscular Re-Education Activity Performed: Yes  Balance/Neuromuscular Re-Education Activity 1: Minimal assistance static standing balance using the wheeled walker.  Balance/Neuromuscular Re-Education Activity 2: Minimal assistance dynamic standing balance using a wheeled walker.    Bed Mobility  Bed Mobility: Yes  Bed Mobility 1  Bed Mobility 1: Sitting to supine  Level of Assistance 1: Minimum assistance, Minimal verbal cues  Bed Mobility Comments 1: log roll technique  Bed Mobility 2  Bed Mobility  2: Sitting to supine  Level of Assistance 2: Minimum assistance  Bed Mobility Comments 2: log roll technique    Ambulation/Gait Training  Ambulation/Gait Training Performed: Yes  Ambulation/Gait Training 1  Surface 1: Level tile  Device 1: Rolling walker  Assistance 1: Minimum assistance, Minimal verbal cues  Quality of Gait 1: Decreased step length, Soft knee(s) (unsteady, stepping too close to walker, decreased sandra, high falls risk, decreased endurance)  Comments/Distance (ft) 1: 15ft  Transfers  Transfer: Yes  Transfer 1  Transfer From 1: Sit to  Transfer to 1: Stand  Transfer Device 1: Walker  Transfer Level of Assistance 1: Minimum assistance, Minimal verbal cues  Transfers 2  Transfer From 2: Stand to  Transfer to 2: Sit  Transfer Device 2: Walker  Transfer Level of  Assistance 2: Minimum assistance  Transfers 3  Transfer From 3: Chair with arms to  Transfer to 3: Bed  Transfer Device 3: Walker  Transfer Level of Assistance 3: Minimum assistance, Minimal verbal cues    Stairs  Stairs: No    Outcome Measures:  [facility] Basic Mobility  Turning from your back to your side while in a flat bed without using bedrails: A little  Moving from lying on your back to sitting on the side of a flat bed without using bedrails: A little  Moving to and from bed to chair (including a wheelchair): A little  Standing up from a chair using your arms (e.g. wheelchair or bedside chair): A little  To walk in hospital room: A little  Climbing 3-5 steps with railing: Total  Basic Mobility - Total Score: 16    OP EDUCATION:  Outpatient Education  Individual(s) Educated: Patient, Spouse  Education Provided: Body Mechanics, Fall Risk, Home Safety, POC, Posture  Patient Response to Education: Patient/Caregiver Verbalized Understanding of Information, Patient/Caregiver Performed Return Demonstration of Exercises/Activities  Education Comment: Pt and  received education on anticipated therapy plan of care during hospital stay and after discharge.    Encounter Problems       Encounter Problems (Active)       Balance       STG - Maintains independent dynamic standing balance with upper extremity support using a wheeled walker. (Not Progressing)       Start:  07/29/25    Expected End:  08/12/25       INTERVENTIONS:  1. Practice standing with minimal support.  2. Educate patient about standing tolerance.  3. Educate patient about independence with gait, transfers, and ADL's.  4. Educate patient about use of assistive device.  5. Educate patient about self-directed care.         STG - Maintains independent static standing balance with upper extremity support using a wheeled walker. (Not Progressing)       Start:  07/29/25    Expected End:  08/12/25       INTERVENTIONS:  1. Practice standing with minimal  support.  2. Educate patient about standing tolerance.  3. Educate patient about independence with gait, transfers, and ADL's.  4. Educate patient about use of assistive device.  5. Educate patient about self-directed care.            Mobility       STG - Patient will ambulate 125ft, independently using a wheeled walker. (Not Progressing)       Start:  07/29/25    Expected End:  08/12/25            STG - Patient will ascend and descend four to six stairs, independently using a standard cane and one rail. (Not Progressing)       Start:  07/29/25    Expected End:  08/12/25               PT Transfers       STG - Transfer from bed to chair, independently using a wheeled walker. (Not Progressing)       Start:  07/29/25    Expected End:  08/12/25            STG - Patient to transfer to and from sit to supine, independently. (Not Progressing)       Start:  07/29/25    Expected End:  08/12/25            STG - Patient will transfer sit to and from stand, independently using a wheeled walker. (Not Progressing)       Start:  07/29/25    Expected End:  08/12/25

## 2025-07-30 NOTE — CARE PLAN
The patient's goals for the shift include      The clinical goals for the shift include pt will remain HDS    Problem: Pain - Adult  Goal: Verbalizes/displays adequate comfort level or baseline comfort level  Outcome: Progressing     Problem: Safety - Adult  Goal: Free from fall injury  Outcome: Progressing     Problem: Discharge Planning  Goal: Discharge to home or other facility with appropriate resources  Outcome: Progressing     Problem: Chronic Conditions and Co-morbidities  Goal: Patient's chronic conditions and co-morbidity symptoms are monitored and maintained or improved  Outcome: Progressing     Problem: Nutrition  Goal: Nutrient intake appropriate for maintaining nutritional needs  Outcome: Progressing

## 2025-07-31 LAB
ALBUMIN SERPL BCP-MCNC: 3.4 G/DL (ref 3.4–5)
ALP SERPL-CCNC: 74 U/L (ref 33–136)
ALT SERPL W P-5'-P-CCNC: 22 U/L (ref 7–45)
AMYLASE FLD-CCNC: 111 U/L
ANION GAP SERPL CALC-SCNC: 9 MMOL/L (ref 10–20)
AST SERPL W P-5'-P-CCNC: 29 U/L (ref 9–39)
BILIRUB SERPL-MCNC: 0.5 MG/DL (ref 0–1.2)
BUN SERPL-MCNC: 16 MG/DL (ref 6–23)
CALCIUM SERPL-MCNC: 8.5 MG/DL (ref 8.6–10.6)
CHLORIDE SERPL-SCNC: 101 MMOL/L (ref 98–107)
CO2 SERPL-SCNC: 27 MMOL/L (ref 21–32)
CREAT SERPL-MCNC: 0.68 MG/DL (ref 0.5–1.05)
EGFRCR SERPLBLD CKD-EPI 2021: >90 ML/MIN/1.73M*2
ERYTHROCYTE [DISTWIDTH] IN BLOOD BY AUTOMATED COUNT: 15.3 % (ref 11.5–14.5)
GLUCOSE BLD MANUAL STRIP-MCNC: 124 MG/DL (ref 74–99)
GLUCOSE BLD MANUAL STRIP-MCNC: 126 MG/DL (ref 74–99)
GLUCOSE BLD MANUAL STRIP-MCNC: 130 MG/DL (ref 74–99)
GLUCOSE BLD MANUAL STRIP-MCNC: 134 MG/DL (ref 74–99)
GLUCOSE BLD MANUAL STRIP-MCNC: 141 MG/DL (ref 74–99)
GLUCOSE BLD MANUAL STRIP-MCNC: 161 MG/DL (ref 74–99)
GLUCOSE SERPL-MCNC: 149 MG/DL (ref 74–99)
HCT VFR BLD AUTO: 32.9 % (ref 36–46)
HGB BLD-MCNC: 9.5 G/DL (ref 12–16)
MAGNESIUM SERPL-MCNC: 2.31 MG/DL (ref 1.6–2.4)
MCH RBC QN AUTO: 25.2 PG (ref 26–34)
MCHC RBC AUTO-ENTMCNC: 28.9 G/DL (ref 32–36)
MCV RBC AUTO: 87 FL (ref 80–100)
NRBC BLD-RTO: 0.1 /100 WBCS (ref 0–0)
PLATELET # BLD AUTO: 308 X10*3/UL (ref 150–450)
POTASSIUM SERPL-SCNC: 4.4 MMOL/L (ref 3.5–5.3)
PROT SERPL-MCNC: 6 G/DL (ref 6.4–8.2)
RBC # BLD AUTO: 3.77 X10*6/UL (ref 4–5.2)
SODIUM SERPL-SCNC: 133 MMOL/L (ref 136–145)
WBC # BLD AUTO: 20.2 X10*3/UL (ref 4.4–11.3)

## 2025-07-31 PROCEDURE — 2500000004 HC RX 250 GENERAL PHARMACY W/ HCPCS (ALT 636 FOR OP/ED): Performed by: STUDENT IN AN ORGANIZED HEALTH CARE EDUCATION/TRAINING PROGRAM

## 2025-07-31 PROCEDURE — 80053 COMPREHEN METABOLIC PANEL: CPT | Performed by: NURSE PRACTITIONER

## 2025-07-31 PROCEDURE — 83735 ASSAY OF MAGNESIUM: CPT | Performed by: NURSE PRACTITIONER

## 2025-07-31 PROCEDURE — 97530 THERAPEUTIC ACTIVITIES: CPT | Mod: GP,CQ

## 2025-07-31 PROCEDURE — 85027 COMPLETE CBC AUTOMATED: CPT | Performed by: NURSE PRACTITIONER

## 2025-07-31 PROCEDURE — 97116 GAIT TRAINING THERAPY: CPT | Mod: GP,CQ

## 2025-07-31 PROCEDURE — 2500000004 HC RX 250 GENERAL PHARMACY W/ HCPCS (ALT 636 FOR OP/ED)

## 2025-07-31 PROCEDURE — 82150 ASSAY OF AMYLASE: CPT

## 2025-07-31 PROCEDURE — 2500000002 HC RX 250 W HCPCS SELF ADMINISTERED DRUGS (ALT 637 FOR MEDICARE OP, ALT 636 FOR OP/ED): Performed by: STUDENT IN AN ORGANIZED HEALTH CARE EDUCATION/TRAINING PROGRAM

## 2025-07-31 PROCEDURE — 36415 COLL VENOUS BLD VENIPUNCTURE: CPT | Performed by: NURSE PRACTITIONER

## 2025-07-31 PROCEDURE — 82947 ASSAY GLUCOSE BLOOD QUANT: CPT

## 2025-07-31 PROCEDURE — 2500000004 HC RX 250 GENERAL PHARMACY W/ HCPCS (ALT 636 FOR OP/ED): Mod: JZ,TB | Performed by: NURSE PRACTITIONER

## 2025-07-31 PROCEDURE — 2500000001 HC RX 250 WO HCPCS SELF ADMINISTERED DRUGS (ALT 637 FOR MEDICARE OP)

## 2025-07-31 PROCEDURE — 2500000002 HC RX 250 W HCPCS SELF ADMINISTERED DRUGS (ALT 637 FOR MEDICARE OP, ALT 636 FOR OP/ED)

## 2025-07-31 PROCEDURE — 1200000003 HC ONCOLOGY  ROOM WITH TELEMETRY DAILY

## 2025-07-31 RX ORDER — PANTOPRAZOLE SODIUM 40 MG/1
40 TABLET, DELAYED RELEASE ORAL
Status: DISCONTINUED | OUTPATIENT
Start: 2025-08-01 | End: 2025-08-02 | Stop reason: HOSPADM

## 2025-07-31 RX ORDER — METOPROLOL TARTRATE 25 MG/1
25 TABLET, FILM COATED ORAL 2 TIMES DAILY
Status: DISCONTINUED | OUTPATIENT
Start: 2025-07-31 | End: 2025-08-02 | Stop reason: HOSPADM

## 2025-07-31 RX ADMIN — KETOROLAC TROMETHAMINE 15 MG: 15 INJECTION, SOLUTION INTRAMUSCULAR; INTRAVENOUS at 15:02

## 2025-07-31 RX ADMIN — TRAMADOL HYDROCHLORIDE 50 MG: 50 TABLET, COATED ORAL at 02:32

## 2025-07-31 RX ADMIN — ENOXAPARIN SODIUM 40 MG: 100 INJECTION SUBCUTANEOUS at 09:14

## 2025-07-31 RX ADMIN — ACETAMINOPHEN 650 MG: 325 TABLET ORAL at 09:14

## 2025-07-31 RX ADMIN — KETOROLAC TROMETHAMINE 15 MG: 15 INJECTION, SOLUTION INTRAMUSCULAR; INTRAVENOUS at 09:15

## 2025-07-31 RX ADMIN — ONDANSETRON 4 MG: 4 TABLET, ORALLY DISINTEGRATING ORAL at 00:58

## 2025-07-31 RX ADMIN — KETOROLAC TROMETHAMINE 15 MG: 15 INJECTION, SOLUTION INTRAMUSCULAR; INTRAVENOUS at 02:27

## 2025-07-31 RX ADMIN — SERTRALINE 50 MG: 50 TABLET, FILM COATED ORAL at 09:15

## 2025-07-31 RX ADMIN — INSULIN LISPRO 1 UNITS: 100 INJECTION, SOLUTION INTRAVENOUS; SUBCUTANEOUS at 04:19

## 2025-07-31 RX ADMIN — METOPROLOL TARTRATE 5 MG: 1 INJECTION, SOLUTION INTRAVENOUS at 04:19

## 2025-07-31 RX ADMIN — ACETAMINOPHEN 650 MG: 325 TABLET ORAL at 21:33

## 2025-07-31 RX ADMIN — ACETAMINOPHEN 650 MG: 325 TABLET ORAL at 15:02

## 2025-07-31 RX ADMIN — METOPROLOL TARTRATE 25 MG: 25 TABLET, FILM COATED ORAL at 21:33

## 2025-07-31 RX ADMIN — METHOCARBAMOL 500 MG: 500 TABLET ORAL at 05:29

## 2025-07-31 RX ADMIN — METHOCARBAMOL 500 MG: 500 TABLET ORAL at 15:02

## 2025-07-31 RX ADMIN — ATORVASTATIN CALCIUM 10 MG: 10 TABLET, FILM COATED ORAL at 09:14

## 2025-07-31 RX ADMIN — METHOCARBAMOL 500 MG: 500 TABLET ORAL at 23:10

## 2025-07-31 RX ADMIN — LEVOTHYROXINE SODIUM 25 MCG: 0.03 TABLET ORAL at 05:29

## 2025-07-31 ASSESSMENT — COGNITIVE AND FUNCTIONAL STATUS - GENERAL
WALKING IN HOSPITAL ROOM: A LITTLE
CLIMB 3 TO 5 STEPS WITH RAILING: A LITTLE
STANDING UP FROM CHAIR USING ARMS: A LITTLE
WALKING IN HOSPITAL ROOM: A LITTLE
MOVING TO AND FROM BED TO CHAIR: A LITTLE
CLIMB 3 TO 5 STEPS WITH RAILING: TOTAL
MOVING FROM LYING ON BACK TO SITTING ON SIDE OF FLAT BED WITH BEDRAILS: A LITTLE
MOBILITY SCORE: 21
DAILY ACTIVITIY SCORE: 24
MOBILITY SCORE: 16
MOVING TO AND FROM BED TO CHAIR: A LITTLE
TURNING FROM BACK TO SIDE WHILE IN FLAT BAD: A LITTLE

## 2025-07-31 ASSESSMENT — PAIN SCALES - GENERAL
PAINLEVEL_OUTOF10: 4
PAINLEVEL_OUTOF10: 0 - NO PAIN
PAINLEVEL_OUTOF10: 7

## 2025-07-31 ASSESSMENT — PAIN - FUNCTIONAL ASSESSMENT
PAIN_FUNCTIONAL_ASSESSMENT: 0-10

## 2025-07-31 NOTE — PROGRESS NOTES
Physical Therapy    Physical Therapy Treatment    Patient Name: Dayan Lopze  MRN: [id number]  Department: [facility]  Room: [address] [place] [address]  Today's Date: 7/31/2025  Time Calculation  Start Time: 1650  Stop Time: 1730  Time Calculation (min): 40 min         Assessment/Plan   PT Assessment  End of Session Communication: Bedside nurse  Assessment Comment: pt demonstrates progress however remains appropriate for high intensity PT upon discharge  End of Session Patient Position: Bed, 3 rail up, Alarm off, caregiver present  PT Plan  Inpatient/Swing Bed or Outpatient: Inpatient  PT Plan  Treatment/Interventions: Bed mobility, Transfer training, Gait training, Stair training, Balance training, Strengthening, Endurance training, Range of motion, Therapeutic exercise, Therapeutic activity, Home exercise program  PT Plan: Ongoing PT  PT Frequency: 5 times per week  PT Discharge Recommendations: High intensity level of continued care  Equipment Recommended upon Discharge: Wheeled walker  PT Recommended Transfer Status: Assist x1  PT - OK to Discharge: Yes    PT Visit Info:  PT Received On: 07/31/25     General Visit Information:   General  Prior to Session Communication: Bedside nurse  Patient Position Received: Up in chair, Alarm on  General Comment: Pt sitting in chair upon arrival, pleasant, cooperative and willing to participate in therapy.    Subjective   Precautions:  Precautions  Medical Precautions: Abdominal precautions, Fall precautions, Oxygen therapy device and L/min  Post-Surgical Precautions: Abdominal surgery precautions            Objective   Pain:  Pain Assessment  0-10 (Numeric) Pain Score: 0 - No pain  Cognition:  Cognition  Orientation Level: Oriented X4       Treatments:  Therapeutic Activity  Therapeutic Activity 1: extensive education on log roll    Bed Mobility 1  Bed Mobility 1: Sitting to supine  Level of Assistance 1: Minimum assistance, Minimal verbal cues  Bed Mobility Comments 1: cues for log  roll    Ambulation/Gait Training 1  Surface 1: Level tile  Device 1: Rolling walker  Assistance 1: Minimum assistance, Minimal verbal cues  Quality of Gait 1: Decreased step length, Soft knee(s)  Comments/Distance (ft) 1: 2x60', cues for walker safety  Transfer 1  Technique 1: Sit to stand, Stand to sit  Transfer Device 1: Walker  Transfer Level of Assistance 1: Minimum assistance, Minimal verbal cues  Trials/Comments 1: cues for set up         Outcome Measures:  [facility] Basic Mobility  Turning from your back to your side while in a flat bed without using bedrails: A little  Moving from lying on your back to sitting on the side of a flat bed without using bedrails: A little  Moving to and from bed to chair (including a wheelchair): A little  Standing up from a chair using your arms (e.g. wheelchair or bedside chair): A little  To walk in hospital room: A little  Climbing 3-5 steps with railing: Total  Basic Mobility - Total Score: 16    Education Documentation  Body Mechanics, taught by Luke Cabral PTA at 7/31/2025  6:05 PM.  Learner: Patient  Readiness: Acceptance  Method: Explanation  Response: Verbalizes Understanding    Education Comments  No comments found.        OP EDUCATION:       Encounter Problems       Encounter Problems (Active)       Balance       STG - Maintains independent dynamic standing balance with upper extremity support using a wheeled walker. (Not Progressing)       Start:  07/29/25    Expected End:  08/12/25       INTERVENTIONS:  1. Practice standing with minimal support.  2. Educate patient about standing tolerance.  3. Educate patient about independence with gait, transfers, and ADL's.  4. Educate patient about use of assistive device.  5. Educate patient about self-directed care.         STG - Maintains independent static standing balance with upper extremity support using a wheeled walker. (Not Progressing)       Start:  07/29/25    Expected End:  08/12/25       INTERVENTIONS:  1.  Practice standing with minimal support.  2. Educate patient about standing tolerance.  3. Educate patient about independence with gait, transfers, and ADL's.  4. Educate patient about use of assistive device.  5. Educate patient about self-directed care.            Mobility       STG - Patient will ambulate 125ft, independently using a wheeled walker. (Progressing)       Start:  07/29/25    Expected End:  08/12/25            STG - Patient will ascend and descend four to six stairs, independently using a standard cane and one rail. (Not Progressing)       Start:  07/29/25    Expected End:  08/12/25               PT Transfers       STG - Transfer from bed to chair, independently using a wheeled walker. (Not Progressing)       Start:  07/29/25    Expected End:  08/12/25            STG - Patient to transfer to and from sit to supine, independently. (Progressing)       Start:  07/29/25    Expected End:  08/12/25            STG - Patient will transfer sit to and from stand, independently using a wheeled walker. (Not Progressing)       Start:  07/29/25    Expected End:  08/12/25

## 2025-07-31 NOTE — PROGRESS NOTES
Surgical Oncology Progress Note      07/31/25      Dayan Lopez is a 69F w h/o HLD, HTN, CHF, NSTEMI s/p PCI (2007), hypothyroid, endometrial Ca s/p hysterectomy, CKD. Here for pancreas bx suspicious for malignancy s/p DPS 7/28. POD3    Subjective   NAEON. Pain is well controlled. Patient seen and evaluated by team at bedside this AM. Patient states that she has passed gas but no BM yet. Had some nausea at midnight, relieved by Zofran.        Review of Systems:    A 12-point review of systems was performed, and was negative except as above.    Objective    Objective:  Vital signs:   Temp:  [36 °C (96.8 °F)-36.6 °C (97.9 °F)] 36 °C (96.8 °F)  Heart Rate:  [70-94] 84  Resp:  [18] 18  BP: (117-135)/(60-85) 134/60    Physical Exam:  GEN: No acute distress. Alert, awake and conversant.  HEENT: Sclera anicteric. Moist mucous membranes.  RESP: Breathing non-labored, equal chest rise. On RA.  CV: Regular rate, normotensive  GI: Abdomen soft, nondistended, appropriately tender. Midline wound with dressing intact, small amounts of strikethrough on inferior portion of dressing consistent from yesterday evening. L drain site with 4x4 over top; drains SS bilaterally.  MSK: No gross deformities. Moves all extremities spontaneously. No edema noted bilaterally.  NEURO: Alert and oriented x3. No focal deficits.  PSYCH: Appropriate mood and affect.  SKIN: No rashes or lesions.    I/O last 2 completed shifts:  In: 2237.7 (23.5 mL/kg) [P.O.:580; I.V.:614.2 (6.5 mL/kg); IV Piggyback:1043.5]  Out: 1960 (20.6 mL/kg) [Urine:1800 (0.8 mL/kg/hr); Drains:160]  Weight: 95.1 kg      Labs Past 18 Hours:  Recent Results (from the past 18 hours)   CBC    Collection Time: 07/30/25  3:43 PM   Result Value Ref Range    WBC 24.7 (H) 4.4 - 11.3 x10*3/uL    nRBC 0.1 (H) 0.0 - 0.0 /100 WBCs    RBC 4.05 4.00 - 5.20 x10*6/uL    Hemoglobin 10.1 (L) 12.0 - 16.0 g/dL    Hematocrit 35.8 (L) 36.0 - 46.0 %    MCV 88 80 - 100 fL    MCH 24.9 (L) 26.0 -  34.0 pg    MCHC 28.2 (L) 32.0 - 36.0 g/dL    RDW 15.6 (H) 11.5 - 14.5 %    Platelets 298 150 - 450 x10*3/uL   POCT GLUCOSE    Collection Time: 07/30/25  4:14 PM   Result Value Ref Range    POCT Glucose 142 (H) 74 - 99 mg/dL   POCT GLUCOSE    Collection Time: 07/30/25  8:59 PM   Result Value Ref Range    POCT Glucose 138 (H) 74 - 99 mg/dL   Amylase, Body Fluid    Collection Time: 07/31/25 12:16 AM   Result Value Ref Range    Amylase, Fluid 111 Not established. U/L   POCT GLUCOSE    Collection Time: 07/31/25 12:21 AM   Result Value Ref Range    POCT Glucose 134 (H) 74 - 99 mg/dL   POCT GLUCOSE    Collection Time: 07/31/25  3:46 AM   Result Value Ref Range    POCT Glucose 161 (H) 74 - 99 mg/dL      Meds:  Current Medications[1]     Imaging:  Imaging  No results found.    Cardiology, Vascular, and Other Imaging  No other imaging results found for the past 2 days      No pertinent imaging to review.    Medications reviewed.  Vital signs reviewed.  Labs reviewed.         Assessment/Plan    Assessment and Plan:  Dayan Lopez is a [place]  69F w h/o HLD, HTN, CHF, NSTEMI s/p PCI (2007), hypothyroid, endometrial Ca s/p hysterectomy, CKD. Here for pancreas bx suspicious for malignancy s/p DPS 7/28, POD3. Patient is in stable condition and unchanged from previous exam. Hudson removed and TOV passed 7/30. Doing well postoperatively.     Plan Today:   Neuro:   - Multimodal pain regimen, switching to oral medications. PO Tylenol, PO robaxin, PO oxy for moderate pain, tramadol for severe pain.   - Ropivacaine gtt discontinued per anesthesia management.   - Continue home sertraline for anxiety.    CV:   - Metoprolol tartrate 25 mg BID, hold parameters in place.  - Continue home lipitor.   - Hold home entresto.     Pulm:   - On RA  - Encourage IS, OOB, and ambulation.    GI:   - Advance to regular diet.   - Continue daily PPI.  - Continue Zofran PRN, Scopolamine patch.  - F/U pathology results.   - Order R drain amylase for 8/1  AM.    Renal:   - Stop mIVF   - Hold home Farxiga until discharge.  - Monitor Bun/Cr, UOP.  - Hudson removed, passed void trial yesterday (7/30).     Endo:   - Continue home synthroid 25 mcg.   - Glucose well controled. Will monitor glucose, on Lispro SSI #1.    Heme/ID:   - remains afebrile, no current abx  - Trend CBC, WBC downtrending    MSK:   - continue PT/OT, OOB, encourage ambulation  - continue ppx with Lovenox 40mg, SCDs    Dispo: Hospital Day: 4. Continue current level of care     Note written by BANDAR BAKER, Acting intern, MS4      Agree with plan as above.   Discussed with attending, Dr. Fong.     Jackie Leonard MD  PGY-1 General Surgery  Surgical Oncology [id number]              [1]   Current Facility-Administered Medications:     acetaminophen (Tylenol) tablet 650 mg, 650 mg, oral, q6h during day, Jackie Leonard MD, 650 mg at 07/30/25 2130    atorvastatin (Lipitor) tablet 10 mg, 10 mg, oral, Daily, Jackie Leonard MD, 10 mg at 07/30/25 0907    [Held by provider] cetirizine (ZyrTEC) tablet 10 mg, 10 mg, oral, Daily, Kim Jewell MD    dextrose 50 % injection 12.5 g, 12.5 g, intravenous, q15 min PRN, Kim Jewell MD    dextrose 50 % injection 25 g, 25 g, intravenous, q15 min PRN, Kim Jewell MD    enoxaparin (Lovenox) syringe 40 mg, 40 mg, subcutaneous, q24h, Kim Jewell MD, 40 mg at 07/30/25 0906    glucagon (Glucagen) injection 1 mg, 1 mg, intramuscular, q15 min PRN, Kim Jewell MD    [START ON 8/1/2025] pneumococcal conjugate 20-valent (PREVNAR 20) vaccine, 0.5 mL, intramuscular, Once **AND** [START ON 8/1/2025] mening vac A,C,Y,W135 dip (PF) (MENVEO) 10-5 mcg/0.5 mL injection 0.5 mL, 0.5 mL, intramuscular, Once **AND** [START ON 8/1/2025] meningococcal B vaccine,4-comp (Bexsero) vaccine 0.5 mL, 0.5 mL, intramuscular, Once **AND** [START ON 8/1/2025] haemophilus b conjugate (ActHIB) 10 mcg/0.5 mL vaccine 0.5 mL, 0.5 mL, intramuscular, Once, Dasia Evans,  APRN-CNP    insulin lispro injection 0-5 Units, 0-5 Units, subcutaneous, q4h, Kim Jewell MD, 1 Units at 07/31/25 0419    ketorolac (Toradol) injection 15 mg, 15 mg, intravenous, q6h, Dasia Evans, APRN-CNP, 15 mg at 07/31/25 0227    levothyroxine (Synthroid, Levoxyl) tablet 25 mcg, 25 mcg, oral, Daily, Jackie Leonard MD, 25 mcg at 07/31/25 0529    methocarbamol (Robaxin) tablet 500 mg, 500 mg, oral, q8h OSCAR, Jackie Leonard MD, 500 mg at 07/31/25 0529    [Held by provider] metoprolol succinate XL (Toprol-XL) 24 hr tablet 25 mg, 25 mg, oral, q12h OSCAR, Kim Jewell MD    metoprolol tartrate (Lopressor) injection 5 mg, 5 mg, intravenous, q6h, Jackie Leonard MD, 5 mg at 07/31/25 0419    naloxone (Narcan) injection 0.2 mg, 0.2 mg, intravenous, PRN, Kim Jewell MD    ondansetron ODT (Zofran-ODT) disintegrating tablet 4 mg, 4 mg, oral, q8h PRN, 4 mg at 07/31/25 0058 **OR** ondansetron (Zofran) injection 4 mg, 4 mg, intravenous, q8h PRN, Kim Jewell MD, 4 mg at 07/30/25 0552    oxyCODONE (Roxicodone) immediate release tablet 5 mg, 5 mg, oral, q6h PRN, Jackie Leonard MD    pantoprazole (Protonix) injection 20 mg, 20 mg, intravenous, Daily, Kim Jewell MD, 20 mg at 07/30/25 0907    ropivacaine (PF) in NS cmpd (Naropin) 1000 mg/500 mL (0.2%) infusion, 14 mL/hr, infiltration, Continuous, Monica Gordon MD, Last Rate: 14 mL/hr at 07/29/25 1100, 14 mL/hr at 07/29/25 1100    [Held by provider] sacubitriL-valsartan (Entresto) 49-51 mg per tablet 1 tablet, 1 tablet, oral, BID, Kim Jewell MD    scopolamine (Transderm-Scop) patch 1 patch, 1 patch, transdermal, q72h, Jackie Leonard MD, 1 patch at 07/30/25 0907    sertraline (Zoloft) tablet 50 mg, 50 mg, oral, Daily, Jackie Leonard MD, 50 mg at 07/30/25 0907    traMADol (Ultram) tablet 50 mg, 50 mg, oral, q8h PRN, Jackie Leonard MD, 50 mg at 07/31/25 0232

## 2025-07-31 NOTE — PREADMISSION SCREENING NOTE
Physical Medicine and Rehabilitation   Physical Medicine and Rehabilitation  [facility] Preadmission Screening      Primary  Diagnosis: PANCREATIC MASS  Rehab Admission Indication: 0016 Debility (Non-Cardiac, Non-Pulmonary): PANCREATIC MASS.     Justification for Admission:  Dayan Lopez is a 69 y.o. year old female who needs acute inpatient rehabilitation in order to achieve the functional goals outlined below.  This patient requires close rehabilitation physician monitoring and management due to her complex medical conditions and co-morbidities.  Comorbid conditions that will impact course of rehabilitation: HLD, HTN, CHF, NSTEMI s/p PCI (2007), hypothyroid, endometrial Ca s/p hysterectomy, CKD..    This patient requires 24-hour rehabilitation nursing to manage bowel and bladder function, skin care, surgical incision, nutrition and fluid intake, pulmonary hygiene, pain control, safety, and medication management. In addition, rehabilitation nursing will reiterate and reinforce therapy skills and equipment use, including ADLs, as well as provide education to the patient and family. Dayan Lopez  is willing to participate in and is able to tolerate the proposed plan of care.      History of Present Illness:   Dayan Lopez is a 69 y.o. female Dayan Lopez is a 69F w h/o HLD, HTN, CHF, NSTEMI s/p PCI (2007), hypothyroid, endometrial Ca s/p hysterectomy, CKD. Here for pancreas bx suspicious for malignancy s/p DPS[ DISTAL PANCREATECTOMY/SPLENECTOMY] 7/28.   Neuro:   - Multimodal pain regimen, switching to oral medications. PO Tylenol, PO robaxin, PO oxy for moderate pain, tramadol for severe pain.   - Continue home sertraline for anxiety.     CV:   - Metoprolol tartrate 25 mg BID, hold parameters in place.  - Continue home lipitor.   - Hold home entresto.      Pulm:   - On RA  - Encourage IS, OOB, and ambulation.     GI:   - Advance to regular diet.   - Continue daily PPI.  - Continue Zofran PRN, Scopolamine  patch.  - F/U pathology results.      Renal:   - Stop mIVF   - Hold home Farxiga until discharge.  - Monitor Bun/Cr, UOP.  - Hudson removed, passed void trial yesterday (7/30).      Endo:   - Continue home synthroid 25 mcg.   - Glucose well controled. Will monitor glucose, on Lispro SSI #1.     Heme/ID:   - remains afebrile, no current abx  - Trend CBC, WBC downtrending     MSK:   - continue PT/OT, OOB, encourage ambulation  - continue ppx with Lovenox 40mg, SCDs      Past Medical History:  Medical History[1]    Past Surgical History  Surgical History[2]    Allergies:  Demerol [meperidine] and Meperidine (pf)      Social History:  Social History[3]    Diet: REGULAR     Code Status: Full Code    Vital Signs:  Temp:  [36 °C (96.8 °F)-37.1 °C (98.8 °F)] 37.1 °C (98.8 °F)  Heart Rate:  [69-94] 69  Resp:  [18-19] 19  BP: (120-142)/(60-85) 142/83  Labs:   Results from last 72 hours   Lab Units 07/31/25  0613 07/30/25  1120 07/29/25  0619   SODIUM mmol/L 133* 133* 138   POTASSIUM mmol/L 4.4 5.1 4.5   CHLORIDE mmol/L 101 102 102   BUN mg/dL 16 19 17   CREATININE mg/dL 0.68 0.75 0.85   MAGNESIUM mg/dL 2.31 2.89* 1.66   PHOSPHORUS mg/dL  --   --  5.4*     Results from last 72 hours   Lab Units 07/31/25  0613 07/30/25  1543 07/29/25  0619   WBC AUTO x10*3/uL 20.2* 24.7* 19.3*   HEMOGLOBIN g/dL 9.5* 10.1* 10.2*   HEMATOCRIT % 32.9* 35.8* 35.6*   PLATELETS AUTO x10*3/uL 308 298 294       Imaging Results:   [unfilled]    No results found for this or any previous visit from the past 10 days.        Medications:  Current Medications and Prescriptions Ordered in Epic[4]    Prior Functional Status:  Lives with  Spouse. RETIRED AND CAN PROVIDE ASSIST.   Type of home /steps House [place]   ADLs / IADLs: IND   Ambulation:  IND   Devices: no device   Baseline Cognition: A&OX3     Current Functional Status:  Eating Independent   Grooming Supervision   Bathing Minimal Assist   UE Dress Supervision   LE Dress Minimal Assist   Toileting  Minimal Assist   Toilet Transfer Minimal Assist   Supine to sit Minimal Assist   Sit to stand Minimal Assist   Bed to chair Minimal Assist   Ambulation 60 FEET , W/W device, Minimal Assist      Cognition A&OX3   Bladder & Bowel CONT       REHABILITATION GOALS AND PLAN  Expected level of improvement:   Mobility: modified independent  Self Care: modified independent  Cognition: independent  Communication: independent  Strengths: good premorbid functional status, actively participates in rehab with carryover ability, accessible home environment, and support of extended family/friends  Barriers to achieving these goals: NONE   Therapy treatments needed to achieve goals: PT for 90 minutes per day, 5 days per week , OT for 90 minutes per day, 5 days per week , and Other: NSG AND CM  Expected length of stay: 3-5 days  When medically stable, anticipated discharge disposition: home with home health care  The potential to achieve that is excellent    I have evaluated this patient for appropriateness for admission to inpatient rehabilitation facility and I agree with the preadmission screen per documentation.          [1]   Past Medical History:  Diagnosis Date    Allergic 1979    Anxiety 2008    On sertraline    Benign paroxysmal positional vertigo 07/28/2009    Chronic kidney disease 12/2024    On farxiga    Coronary artery disease 07/2007    COVID     Dizziness About 7 years ago    Have had therapy in the past    Endometrial cancer (Multi) 10/24/2023    Epistaxis ER 11/23. [facility]    Childhood    Fractures 2000(?)    Have 2 plates and 1 riri in my rt leg    GERD (gastroesophageal reflux disease) 1990?    On omeprazole    Heart disease 2007    Hernia, internal During my routine colonoscopies- 10 years or so    Never treated    Hypertension 2007    Hypothyroidism 11/2023    On levothyroxine    MI (myocardial infarction) (Multi) 03/18/2013    Formatting of this note might be different from the original. 2007    Old  myocardial infarction     History of myocardial infarction    Personal history of malignant neoplasm of other parts of uterus     History of cancer of uterus    Personal history of other diseases of the digestive system     History of gastroesophageal reflux (GERD)    Personal history of other specified conditions     History of vertigo    Uterine cancer (Multi) 10/24/2023   [2]   Past Surgical History:  Procedure Laterality Date    CARDIAC CATHETERIZATION  07/2007    COLONOSCOPY  10/11/2019    diverticulosis, otherwise normal, due in 2029    CORONARY ANGIOPLASTY WITH STENT PLACEMENT  2007    FRACTURE SURGERY  04/2000    HYSTERECTOMY  12/05/2014    Hysterectomy    OTHER SURGICAL HISTORY  12/05/2014    Transcath Placement Of Intrathoracic Carotid Artery Stent    OTHER SURGICAL HISTORY  12/05/2014    Treatment Of The Right Leg   [3]   Social History  Tobacco Use    Smoking status: Never    Smokeless tobacco: Never   Substance Use Topics    Alcohol use: Not Currently     Alcohol/week: 1.0 standard drink of alcohol     Types: 1 Glasses of wine per week     Comment: once a month    Drug use: Never   [4]   Current Facility-Administered Medications Ordered in Epic   Medication Dose Route Frequency Provider Last Rate Last Admin    acetaminophen (Tylenol) tablet 650 mg  650 mg oral q6h during day Jackie Leonard MD   650 mg at 07/31/25 0914    atorvastatin (Lipitor) tablet 10 mg  10 mg oral Daily Jackie Leonard MD   10 mg at 07/31/25 0914    [Held by provider] cetirizine (ZyrTEC) tablet 10 mg  10 mg oral Daily Kim Jewell MD        dextrose 50 % injection 12.5 g  12.5 g intravenous q15 min PRN Kim Jewell MD        dextrose 50 % injection 25 g  25 g intravenous q15 min PRN Kim Jewell MD        enoxaparin (Lovenox) syringe 40 mg  40 mg subcutaneous q24h Jackie Leonard MD   40 mg at 07/31/25 0914    glucagon (Glucagen) injection 1 mg  1 mg intramuscular q15 min PRN Kim Jewell MD         [START ON 8/1/2025] pneumococcal conjugate 20-valent (PREVNAR 20) vaccine  0.5 mL intramuscular Once MEMO Green        And    [START ON 8/1/2025] mening vac A,C,Y,W135 dip (PF) (MENVEO) 10-5 mcg/0.5 mL injection 0.5 mL  0.5 mL intramuscular Once MEMO Green        And    [START ON 8/1/2025] meningococcal B vaccine,4-comp (Bexsero) vaccine 0.5 mL  0.5 mL intramuscular Once MEMO Green        And    [START ON 8/1/2025] haemophilus b conjugate (ActHIB) 10 mcg/0.5 mL vaccine 0.5 mL  0.5 mL intramuscular Once MEMO Green        insulin lispro injection 0-5 Units  0-5 Units subcutaneous q4h Kim Jewell MD   1 Units at 07/31/25 0419    ketorolac (Toradol) injection 15 mg  15 mg intravenous q6h MEMO Green   15 mg at 07/31/25 0915    levothyroxine (Synthroid, Levoxyl) tablet 25 mcg  25 mcg oral Daily Jackie Leonard MD   25 mcg at 07/31/25 0529    methocarbamol (Robaxin) tablet 500 mg  500 mg oral q8h OSCAR Jackie Leonard MD   500 mg at 07/31/25 0529    metoprolol tartrate (Lopressor) tablet 25 mg  25 mg oral BID Jackie Leonard MD        naloxone (Narcan) injection 0.2 mg  0.2 mg intravenous PRN Kim Jewell MD        ondansetron ODT (Zofran-ODT) disintegrating tablet 4 mg  4 mg oral q8h PRN Kim Jewell MD   4 mg at 07/31/25 0058    Or    ondansetron (Zofran) injection 4 mg  4 mg intravenous q8h PRN Kim Jewell MD   4 mg at 07/30/25 0552    oxyCODONE (Roxicodone) immediate release tablet 5 mg  5 mg oral q6h PRN Jackie Leonard MD        [START ON 8/1/2025] pantoprazole (ProtoNix) EC tablet 40 mg  40 mg oral Daily before breakfast ERIC Green-CNP        [Held by provider] sacubitriL-valsartan (Entresto) 49-51 mg per tablet 1 tablet  1 tablet oral BID Kim Jewell MD        scopolamine (Transderm-Scop) patch 1 patch  1 patch transdermal q72h Jackie Leonard MD   1 patch at 07/30/25 0907     sertraline (Zoloft) tablet 50 mg  50 mg oral Daily Jackie Leonard MD   50 mg at 07/31/25 0915    traMADol (Ultram) tablet 50 mg  50 mg oral q8h PRN Jackie Leonard MD   50 mg at 07/31/25 0232     Current Outpatient Medications Ordered in Epic   Medication Sig Dispense Refill    acetaminophen (Tylenol 8 HOUR) 650 mg ER tablet Take 1 tablet (650 mg) by mouth every 8 hours if needed for mild pain (1 - 3). Do not crush, chew, or split.      chlorhexidine (Hibiclens) 4 % external liquid Apply 1 Application topically once daily. Use per CPM/PAT provided instructions 473 mL 0    enoxaparin (Lovenox) 40 mg/0.4 mL syringe Inject 0.4 mL (40 mg) under the skin once every 24 hours. 22 each 0    levothyroxine (Synthroid, Levoxyl) 25 mcg tablet Take 1 tablet (25 mcg) by mouth early in the morning.. Take on an empty stomach at the same time each day, either 30 to 60 minutes prior to breakfast      methocarbamol (Robaxin) 500 mg tablet Take 1 tablet (500 mg) by mouth 4 times a day as needed for muscle spasms.      naloxone (Narcan) 4 mg/0.1 mL nasal spray Instill 1 spray (4 mg) into 1 nosril if needed for respiratory depression. May repeat in 2-3 minutes if needed, alternating nostrils 2 each PRN    traMADol (Ultram) 50 mg tablet Take 1 tablet (50 mg) by mouth every 6 hours if needed for severe pain (7 - 10) for up to 7 days. 28 tablet 0

## 2025-07-31 NOTE — SIGNIFICANT EVENT
Acute Pain Team Updates    - B/L KANDI catheters removed last night  - Okay for medical DVT ppx  - Rest of pain management per primary team     Acute Pain Resident  pg [phone] ph [phone]

## 2025-07-31 NOTE — SIGNIFICANT EVENT
Anesthesia resident to bedside to pull bilateral KANDI catheters 12 hours after last ppx Lovenox dose. Acute pain service signing off.

## 2025-07-31 NOTE — PROGRESS NOTES
07/31/25 1100   Discharge Planning   Living Arrangements Spouse/significant other   Support Systems Spouse/significant other;Family members   Type of Residence Private residence   Home or Post Acute Services Post acute facilities (Rehab/SNF/etc)   Type of Post Acute Facility Services Rehab   Does the patient need discharge transport arranged? Yes   [facility] coordination needed? Yes   Has discharge transport been arranged? No   Patient Choice   Provider Choice list and CMS website (https://medicare.gov/care-compare#search) for post-acute Quality and Resource Measure Data were provided and reviewed with: Patient   Patient / Family choosing to utilize agency / facility established prior to hospitalization No     SW met with pt to discuss therapy recommendation of high intensity.  Pt is alert and oriented x2-3, SW did wake pt up.  SW discussed rehab and provided list of rehabs.  Pt wishes to discuss with her spouse before making decision.  Pt states spouse is coming to the hospital today.  Will follow.  GEN Kuo    7/31/25  12:40pm  Rehab choices obtained - [facility] and [facility].  [facility]  Will follow.  GEN Kuo

## 2025-08-01 ENCOUNTER — DOCUMENTATION (OUTPATIENT)
Dept: HOME HEALTH SERVICES | Facility: HOME HEALTH | Age: 70
End: 2025-08-01
Payer: MEDICARE

## 2025-08-01 LAB
ALBUMIN SERPL BCP-MCNC: 3.4 G/DL (ref 3.4–5)
ALP SERPL-CCNC: 79 U/L (ref 33–136)
ALT SERPL W P-5'-P-CCNC: 23 U/L (ref 7–45)
ANION GAP SERPL CALC-SCNC: 13 MMOL/L (ref 10–20)
APTT PPP: 24 SECONDS (ref 26–36)
AST SERPL W P-5'-P-CCNC: 24 U/L (ref 9–39)
BILIRUB SERPL-MCNC: 0.6 MG/DL (ref 0–1.2)
BUN SERPL-MCNC: 13 MG/DL (ref 6–23)
CALCIUM SERPL-MCNC: 8.8 MG/DL (ref 8.6–10.6)
CHLORIDE SERPL-SCNC: 102 MMOL/L (ref 98–107)
CO2 SERPL-SCNC: 29 MMOL/L (ref 21–32)
CREAT SERPL-MCNC: 0.57 MG/DL (ref 0.5–1.05)
EGFRCR SERPLBLD CKD-EPI 2021: >90 ML/MIN/1.73M*2
ERYTHROCYTE [DISTWIDTH] IN BLOOD BY AUTOMATED COUNT: 15.5 % (ref 11.5–14.5)
GLUCOSE BLD MANUAL STRIP-MCNC: 132 MG/DL (ref 74–99)
GLUCOSE BLD MANUAL STRIP-MCNC: 133 MG/DL (ref 74–99)
GLUCOSE BLD MANUAL STRIP-MCNC: 134 MG/DL (ref 74–99)
GLUCOSE BLD MANUAL STRIP-MCNC: 137 MG/DL (ref 74–99)
GLUCOSE BLD MANUAL STRIP-MCNC: 153 MG/DL (ref 74–99)
GLUCOSE BLD MANUAL STRIP-MCNC: 160 MG/DL (ref 74–99)
GLUCOSE SERPL-MCNC: 127 MG/DL (ref 74–99)
HCT VFR BLD AUTO: 33.6 % (ref 36–46)
HGB BLD-MCNC: 9.6 G/DL (ref 12–16)
INR PPP: 1.1 (ref 0.9–1.1)
MAGNESIUM SERPL-MCNC: 1.97 MG/DL (ref 1.6–2.4)
MCH RBC QN AUTO: 24.8 PG (ref 26–34)
MCHC RBC AUTO-ENTMCNC: 28.6 G/DL (ref 32–36)
MCV RBC AUTO: 87 FL (ref 80–100)
NRBC BLD-RTO: 1.2 /100 WBCS (ref 0–0)
PLATELET # BLD AUTO: 350 X10*3/UL (ref 150–450)
POTASSIUM SERPL-SCNC: 4.5 MMOL/L (ref 3.5–5.3)
PROT SERPL-MCNC: 6.2 G/DL (ref 6.4–8.2)
PROTHROMBIN TIME: 11.9 SECONDS (ref 9.8–12.4)
RBC # BLD AUTO: 3.87 X10*6/UL (ref 4–5.2)
SODIUM SERPL-SCNC: 139 MMOL/L (ref 136–145)
WBC # BLD AUTO: 17.4 X10*3/UL (ref 4.4–11.3)

## 2025-08-01 PROCEDURE — 83735 ASSAY OF MAGNESIUM: CPT

## 2025-08-01 PROCEDURE — 82947 ASSAY GLUCOSE BLOOD QUANT: CPT

## 2025-08-01 PROCEDURE — 2500000002 HC RX 250 W HCPCS SELF ADMINISTERED DRUGS (ALT 637 FOR MEDICARE OP, ALT 636 FOR OP/ED)

## 2025-08-01 PROCEDURE — 36415 COLL VENOUS BLD VENIPUNCTURE: CPT

## 2025-08-01 PROCEDURE — 97530 THERAPEUTIC ACTIVITIES: CPT | Mod: GP,CQ

## 2025-08-01 PROCEDURE — 80053 COMPREHEN METABOLIC PANEL: CPT

## 2025-08-01 PROCEDURE — 2500000002 HC RX 250 W HCPCS SELF ADMINISTERED DRUGS (ALT 637 FOR MEDICARE OP, ALT 636 FOR OP/ED): Performed by: STUDENT IN AN ORGANIZED HEALTH CARE EDUCATION/TRAINING PROGRAM

## 2025-08-01 PROCEDURE — 85610 PROTHROMBIN TIME: CPT

## 2025-08-01 PROCEDURE — 90677 PCV20 VACCINE IM: CPT | Performed by: NURSE PRACTITIONER

## 2025-08-01 PROCEDURE — 1170000001 HC PRIVATE ONCOLOGY ROOM DAILY

## 2025-08-01 PROCEDURE — 90471 IMMUNIZATION ADMIN: CPT | Performed by: NURSE PRACTITIONER

## 2025-08-01 PROCEDURE — 2500000004 HC RX 250 GENERAL PHARMACY W/ HCPCS (ALT 636 FOR OP/ED)

## 2025-08-01 PROCEDURE — 97116 GAIT TRAINING THERAPY: CPT | Mod: GP,CQ

## 2025-08-01 PROCEDURE — 90648 HIB PRP-T VACCINE 4 DOSE IM: CPT | Performed by: NURSE PRACTITIONER

## 2025-08-01 PROCEDURE — 2500000005 HC RX 250 GENERAL PHARMACY W/O HCPCS: Performed by: NURSE PRACTITIONER

## 2025-08-01 PROCEDURE — G0009 ADMIN PNEUMOCOCCAL VACCINE: HCPCS | Performed by: NURSE PRACTITIONER

## 2025-08-01 PROCEDURE — 85027 COMPLETE CBC AUTOMATED: CPT

## 2025-08-01 PROCEDURE — 90620 MENB-4C VACCINE IM: CPT | Performed by: NURSE PRACTITIONER

## 2025-08-01 PROCEDURE — 2500000004 HC RX 250 GENERAL PHARMACY W/ HCPCS (ALT 636 FOR OP/ED): Performed by: NURSE PRACTITIONER

## 2025-08-01 PROCEDURE — 85730 THROMBOPLASTIN TIME PARTIAL: CPT

## 2025-08-01 PROCEDURE — 2500000001 HC RX 250 WO HCPCS SELF ADMINISTERED DRUGS (ALT 637 FOR MEDICARE OP): Performed by: NURSE PRACTITIONER

## 2025-08-01 PROCEDURE — 2500000001 HC RX 250 WO HCPCS SELF ADMINISTERED DRUGS (ALT 637 FOR MEDICARE OP)

## 2025-08-01 PROCEDURE — 90472 IMMUNIZATION ADMIN EACH ADD: CPT | Performed by: NURSE PRACTITIONER

## 2025-08-01 RX ORDER — ACETAMINOPHEN 325 MG/1
650 TABLET ORAL
Status: ON HOLD | COMMUNITY
Start: 2025-08-01

## 2025-08-01 RX ORDER — OXYCODONE HYDROCHLORIDE 5 MG/1
5 TABLET ORAL EVERY 6 HOURS PRN
Qty: 10 TABLET | Refills: 0 | Status: ON HOLD | OUTPATIENT
Start: 2025-08-01

## 2025-08-01 RX ORDER — TRAMADOL HYDROCHLORIDE 50 MG/1
50 TABLET, FILM COATED ORAL EVERY 6 HOURS PRN
Qty: 15 TABLET | Refills: 0 | Status: ON HOLD | OUTPATIENT
Start: 2025-08-01

## 2025-08-01 RX ADMIN — PANTOPRAZOLE SODIUM 40 MG: 40 TABLET, DELAYED RELEASE ORAL at 05:32

## 2025-08-01 RX ADMIN — ACETAMINOPHEN 650 MG: 325 TABLET ORAL at 20:23

## 2025-08-01 RX ADMIN — METHOCARBAMOL 500 MG: 500 TABLET ORAL at 15:31

## 2025-08-01 RX ADMIN — ACETAMINOPHEN 650 MG: 325 TABLET ORAL at 09:09

## 2025-08-01 RX ADMIN — METHOCARBAMOL 500 MG: 500 TABLET ORAL at 23:24

## 2025-08-01 RX ADMIN — HAEMOPHILUS B POLYSACCHARIDE CONJUGATE VACCINE FOR INJ 0.5 ML: RECON SOLN at 12:44

## 2025-08-01 RX ADMIN — LEVOTHYROXINE SODIUM 25 MCG: 0.03 TABLET ORAL at 05:32

## 2025-08-01 RX ADMIN — INSULIN LISPRO 1 UNITS: 100 INJECTION, SOLUTION INTRAVENOUS; SUBCUTANEOUS at 13:40

## 2025-08-01 RX ADMIN — ENOXAPARIN SODIUM 40 MG: 100 INJECTION SUBCUTANEOUS at 09:08

## 2025-08-01 RX ADMIN — SACUBITRIL AND VALSARTAN 1 TABLET: 49; 51 TABLET, FILM COATED ORAL at 20:23

## 2025-08-01 RX ADMIN — CETIRIZINE HYDROCHLORIDE 10 MG: 10 TABLET ORAL at 09:09

## 2025-08-01 RX ADMIN — NEISSERIA MENINGITIDIS SEROGROUP B NHBA FUSION PROTEIN ANTIGEN, NEISSERIA MENINGITIDIS SEROGROUP B FHBP FUSION PROTEIN ANTIGEN AND NEISSERIA MENINGITIDIS SEROGROUP B NADA PROTEIN ANTIGEN 0.5 ML: 50; 50; 50; 25 INJECTION, SUSPENSION INTRAMUSCULAR at 09:09

## 2025-08-01 RX ADMIN — METOPROLOL TARTRATE 25 MG: 25 TABLET, FILM COATED ORAL at 20:23

## 2025-08-01 RX ADMIN — INSULIN LISPRO 1 UNITS: 100 INJECTION, SOLUTION INTRAVENOUS; SUBCUTANEOUS at 17:31

## 2025-08-01 RX ADMIN — MENINGOCOCCAL (GROUPS A, C, Y AND W-135) OLIGOSACCHARIDE DIPHTHERIA CRM197 CONJUGATE VACCINE 0.5 ML: KIT at 12:46

## 2025-08-01 RX ADMIN — SACUBITRIL AND VALSARTAN 1 TABLET: 49; 51 TABLET, FILM COATED ORAL at 09:08

## 2025-08-01 RX ADMIN — SERTRALINE 50 MG: 50 TABLET, FILM COATED ORAL at 09:09

## 2025-08-01 RX ADMIN — METOPROLOL TARTRATE 25 MG: 25 TABLET, FILM COATED ORAL at 09:09

## 2025-08-01 RX ADMIN — METHOCARBAMOL 500 MG: 500 TABLET ORAL at 05:32

## 2025-08-01 RX ADMIN — TRAMADOL HYDROCHLORIDE 50 MG: 50 TABLET, COATED ORAL at 12:43

## 2025-08-01 RX ADMIN — ATORVASTATIN CALCIUM 10 MG: 10 TABLET, FILM COATED ORAL at 09:09

## 2025-08-01 RX ADMIN — PNEUMOCOCCAL 20-VALENT CONJUGATE VACCINE 0.5 ML
2.2; 2.2; 2.2; 2.2; 2.2; 2.2; 2.2; 2.2; 2.2; 2.2; 2.2; 2.2; 2.2; 2.2; 2.2; 2.2; 4.4; 2.2; 2.2; 2.2 INJECTION, SUSPENSION INTRAMUSCULAR at 09:10

## 2025-08-01 ASSESSMENT — PAIN SCALES - GENERAL
PAINLEVEL_OUTOF10: 4
PAINLEVEL_OUTOF10: 0 - NO PAIN
PAINLEVEL_OUTOF10: 5 - MODERATE PAIN
PAINLEVEL_OUTOF10: 3

## 2025-08-01 ASSESSMENT — COGNITIVE AND FUNCTIONAL STATUS - GENERAL
DRESSING REGULAR UPPER BODY CLOTHING: A LITTLE
MOVING FROM LYING ON BACK TO SITTING ON SIDE OF FLAT BED WITH BEDRAILS: A LITTLE
MOVING TO AND FROM BED TO CHAIR: A LITTLE
PERSONAL GROOMING: A LITTLE
STANDING UP FROM CHAIR USING ARMS: A LITTLE
CLIMB 3 TO 5 STEPS WITH RAILING: TOTAL
TOILETING: A LITTLE
MOVING FROM LYING ON BACK TO SITTING ON SIDE OF FLAT BED WITH BEDRAILS: A LITTLE
TURNING FROM BACK TO SIDE WHILE IN FLAT BAD: A LITTLE
MOBILITY SCORE: 16
WALKING IN HOSPITAL ROOM: A LITTLE
TURNING FROM BACK TO SIDE WHILE IN FLAT BAD: A LITTLE
DRESSING REGULAR LOWER BODY CLOTHING: A LITTLE
DAILY ACTIVITIY SCORE: 19
MOVING TO AND FROM BED TO CHAIR: A LITTLE
HELP NEEDED FOR BATHING: A LITTLE
STANDING UP FROM CHAIR USING ARMS: A LITTLE
MOBILITY SCORE: 17
CLIMB 3 TO 5 STEPS WITH RAILING: A LOT
WALKING IN HOSPITAL ROOM: A LITTLE

## 2025-08-01 ASSESSMENT — PAIN - FUNCTIONAL ASSESSMENT
PAIN_FUNCTIONAL_ASSESSMENT: 0-10

## 2025-08-01 NOTE — PROGRESS NOTES
Physical Therapy    Physical Therapy Treatment    Patient Name: Dayan Lopez  MRN: [id number]  Department: [facility]  Room: [address] [place] [address]  Today's Date: 8/1/2025  Time Calculation  Start Time: 1118  Stop Time: 1142  Time Calculation (min): 24 min         Assessment/Plan   PT Assessment  End of Session Communication: Bedside nurse  Assessment Comment: pt demonstrates increased fatigue from ambulation and requires additional rest throughout session.  End of Session Patient Position: Bed, 3 rail up, Alarm off, caregiver present  PT Plan  Inpatient/Swing Bed or Outpatient: Inpatient  PT Plan  Treatment/Interventions: Bed mobility, Transfer training, Gait training, Stair training, Balance training, Strengthening, Endurance training, Range of motion, Therapeutic exercise, Therapeutic activity, Home exercise program  PT Plan: Ongoing PT  PT Frequency: 5 times per week  PT Discharge Recommendations: High intensity level of continued care  Equipment Recommended upon Discharge: Wheeled walker  PT Recommended Transfer Status: Assist x1  PT - OK to Discharge: Yes    PT Visit Info:  PT Received On: 08/01/25     General Visit Information:   General  Prior to Session Communication: Bedside nurse  Patient Position Received: Up in chair, Alarm on  General Comment: Pt sitting in chair upon arrival, pleasant, cooperative and willing to participate in therapy.    Subjective   Precautions:  Precautions  Medical Precautions: Abdominal precautions, Fall precautions, Oxygen therapy device and L/min  Post-Surgical Precautions: Abdominal surgery precautions     Date/Time Vitals Session Patient Position Pulse Resp SpO2 BP MAP (mmHg)    08/01/25 1624 --  --  83  18  96 %  142/76  93            Objective   Pain:  Pain Assessment  0-10 (Numeric) Pain Score: 0 - No pain  Cognition:  Cognition  Orientation Level: Oriented X4    Treatments:       Therapeutic Activity  Therapeutic Activity 1: educaiton on HEP, log roll, and abdominal precations.  addiotnal education on sitting posture         Ambulation/Gait Training 1  Surface 1: Level tile  Device 1: Rolling walker  Assistance 1: Contact guard  Quality of Gait 1: Decreased step length, Soft knee(s)  Comments/Distance (ft) 1: 1x60', 1x40'. 3 minute rest between trials. Cues for posture and step length  Transfer 1  Technique 1: Sit to stand, Stand to sit  Transfer Device 1: Walker  Transfer Level of Assistance 1: Minimum assistance, Minimal verbal cues  Trials/Comments 1: cues for safe hand palcement and pacing         Outcome Measures:  [facility] Basic Mobility  Turning from your back to your side while in a flat bed without using bedrails: A little  Moving from lying on your back to sitting on the side of a flat bed without using bedrails: A little  Moving to and from bed to chair (including a wheelchair): A little  Standing up from a chair using your arms (e.g. wheelchair or bedside chair): A little  To walk in hospital room: A little  Climbing 3-5 steps with railing: Total  Basic Mobility - Total Score: 16    Education Documentation  Home Exercise Program, taught by Luke Cabral PTA at 8/1/2025  5:35 PM.  Learner: Patient  Readiness: Acceptance  Method: Explanation  Response: Verbalizes Understanding  Comment: exercises to complete in room    Education Comments  No comments found.        OP EDUCATION:       Encounter Problems       Encounter Problems (Active)       Balance       STG - Maintains independent dynamic standing balance with upper extremity support using a wheeled walker. (Not Progressing)       Start:  07/29/25    Expected End:  08/12/25       INTERVENTIONS:  1. Practice standing with minimal support.  2. Educate patient about standing tolerance.  3. Educate patient about independence with gait, transfers, and ADL's.  4. Educate patient about use of assistive device.  5. Educate patient about self-directed care.         STG - Maintains independent static standing balance with upper extremity  support using a wheeled walker. (Not Progressing)       Start:  07/29/25    Expected End:  08/12/25       INTERVENTIONS:  1. Practice standing with minimal support.  2. Educate patient about standing tolerance.  3. Educate patient about independence with gait, transfers, and ADL's.  4. Educate patient about use of assistive device.  5. Educate patient about self-directed care.            Mobility       STG - Patient will ambulate 125ft, independently using a wheeled walker. (Progressing)       Start:  07/29/25    Expected End:  08/12/25            STG - Patient will ascend and descend four to six stairs, independently using a standard cane and one rail. (Progressing)       Start:  07/29/25    Expected End:  08/12/25               PT Transfers       STG - Transfer from bed to chair, independently using a wheeled walker. (Progressing)       Start:  07/29/25    Expected End:  08/12/25            STG - Patient to transfer to and from sit to supine, independently. (Progressing)       Start:  07/29/25    Expected End:  08/12/25            STG - Patient will transfer sit to and from stand, independently using a wheeled walker. (Progressing)       Start:  07/29/25    Expected End:  08/12/25

## 2025-08-01 NOTE — HH CARE COORDINATION
Home Care received a referral for Physical Therapy. Unfortunately, we are unable to accept and process the referral at this time.    Reason:  Patient is discharging to a Post-Acute Care Facility    Patients, please reach out to the referring provider or your PCP to assist in obtaining an alternative home care agency and/or guidance to meet your needs.    Providers, please reach out to  Home Care at [phone] with any questions regarding the declined referral.

## 2025-08-01 NOTE — CARE PLAN
The patient's goals for the shift include      The clinical goals for the shift include remain HDS      Problem: Pain - Adult  Goal: Verbalizes/displays adequate comfort level or baseline comfort level  Outcome: Progressing     Problem: Safety - Adult  Goal: Free from fall injury  Outcome: Progressing     Problem: Discharge Planning  Goal: Discharge to home or other facility with appropriate resources  Outcome: Progressing     Problem: Chronic Conditions and Co-morbidities  Goal: Patient's chronic conditions and co-morbidity symptoms are monitored and maintained or improved  Outcome: Progressing     Problem: Nutrition  Goal: Nutrient intake appropriate for maintaining nutritional needs  Outcome: Progressing     Problem: Skin  Goal: Decreased wound size/increased tissue granulation at next dressing change  Outcome: Progressing  Goal: Participates in plan/prevention/treatment measures  Outcome: Progressing  Goal: Prevent/manage excess moisture  Outcome: Progressing  Goal: Prevent/minimize sheer/friction injuries  Outcome: Progressing  Goal: Promote/optimize nutrition  Outcome: Progressing  Goal: Promote skin healing  Outcome: Progressing

## 2025-08-01 NOTE — PROGRESS NOTES
08/01/25 1300   Discharge Planning   Living Arrangements Spouse/significant other   Support Systems Spouse/significant other;Family members   Type of Residence Private residence   Home or Post Acute Services Post acute facilities (Rehab/SNF/etc)   Type of Post Acute Facility Services Rehab   Does the patient need discharge transport arranged? Yes   [facility] coordination needed? Yes   Has discharge transport been arranged? Yes   What day is the transport expected? 08/02/25   What time is the transport expected? 1230     Pt and spouse agree to transfer to [facility].  Pt has been accepted and can be received 8/2.  Ambulance transport arranged for 12:30pm  with [facility].  Nursing report number is  [phone].  Pt and spouse aware.  Care team aware.    GEN Kuo

## 2025-08-02 ENCOUNTER — HOSPITAL ENCOUNTER (INPATIENT)
Facility: HOSPITAL | Age: 70
LOS: 4 days | Discharge: HOME | DRG: 948 | End: 2025-08-06
Attending: PHYSICAL MEDICINE & REHABILITATION | Admitting: PHYSICAL MEDICINE & REHABILITATION
Payer: MEDICARE

## 2025-08-02 VITALS
DIASTOLIC BLOOD PRESSURE: 52 MMHG | TEMPERATURE: 98.4 F | RESPIRATION RATE: 18 BRPM | WEIGHT: 203.5 LBS | HEART RATE: 94 BPM | OXYGEN SATURATION: 95 % | HEIGHT: 62 IN | BODY MASS INDEX: 37.45 KG/M2 | SYSTOLIC BLOOD PRESSURE: 128 MMHG

## 2025-08-02 DIAGNOSIS — I10 HYPERTENSION, UNSPECIFIED TYPE: Primary | ICD-10-CM

## 2025-08-02 PROBLEM — R53.81 DEBILITY: Status: ACTIVE | Noted: 2025-08-02

## 2025-08-02 LAB
GLUCOSE BLD MANUAL STRIP-MCNC: 134 MG/DL (ref 74–99)
GLUCOSE BLD MANUAL STRIP-MCNC: 166 MG/DL (ref 74–99)
GLUCOSE BLD MANUAL STRIP-MCNC: 174 MG/DL (ref 74–99)

## 2025-08-02 PROCEDURE — 2500000004 HC RX 250 GENERAL PHARMACY W/ HCPCS (ALT 636 FOR OP/ED)

## 2025-08-02 PROCEDURE — 1280000001 HC REHAB SEMI-PRIVATE ROOM DAILY

## 2025-08-02 PROCEDURE — 82947 ASSAY GLUCOSE BLOOD QUANT: CPT

## 2025-08-02 PROCEDURE — 2500000001 HC RX 250 WO HCPCS SELF ADMINISTERED DRUGS (ALT 637 FOR MEDICARE OP): Performed by: PHYSICAL MEDICINE & REHABILITATION

## 2025-08-02 PROCEDURE — 2500000002 HC RX 250 W HCPCS SELF ADMINISTERED DRUGS (ALT 637 FOR MEDICARE OP, ALT 636 FOR OP/ED): Performed by: STUDENT IN AN ORGANIZED HEALTH CARE EDUCATION/TRAINING PROGRAM

## 2025-08-02 PROCEDURE — 2500000001 HC RX 250 WO HCPCS SELF ADMINISTERED DRUGS (ALT 637 FOR MEDICARE OP): Performed by: NURSE PRACTITIONER

## 2025-08-02 PROCEDURE — 2500000001 HC RX 250 WO HCPCS SELF ADMINISTERED DRUGS (ALT 637 FOR MEDICARE OP)

## 2025-08-02 PROCEDURE — 2500000002 HC RX 250 W HCPCS SELF ADMINISTERED DRUGS (ALT 637 FOR MEDICARE OP, ALT 636 FOR OP/ED)

## 2025-08-02 RX ORDER — SERTRALINE HYDROCHLORIDE 50 MG/1
50 TABLET, FILM COATED ORAL DAILY
Status: DISCONTINUED | OUTPATIENT
Start: 2025-08-03 | End: 2025-08-06 | Stop reason: HOSPADM

## 2025-08-02 RX ORDER — SENNOSIDES 8.6 MG/1
1 TABLET ORAL NIGHTLY
Status: DISCONTINUED | OUTPATIENT
Start: 2025-08-02 | End: 2025-08-06 | Stop reason: HOSPADM

## 2025-08-02 RX ORDER — ACETAMINOPHEN 160 MG/5ML
650 SOLUTION ORAL EVERY 4 HOURS PRN
Status: DISCONTINUED | OUTPATIENT
Start: 2025-08-02 | End: 2025-08-06 | Stop reason: HOSPADM

## 2025-08-02 RX ORDER — BISACODYL 5 MG
10 TABLET, DELAYED RELEASE (ENTERIC COATED) ORAL DAILY PRN
Status: DISCONTINUED | OUTPATIENT
Start: 2025-08-02 | End: 2025-08-06 | Stop reason: HOSPADM

## 2025-08-02 RX ORDER — ACETAMINOPHEN 325 MG/1
650 TABLET ORAL
Status: DISCONTINUED | OUTPATIENT
Start: 2025-08-02 | End: 2025-08-06 | Stop reason: HOSPADM

## 2025-08-02 RX ORDER — LEVOTHYROXINE SODIUM 25 UG/1
25 TABLET ORAL DAILY
Status: DISCONTINUED | OUTPATIENT
Start: 2025-08-03 | End: 2025-08-06 | Stop reason: HOSPADM

## 2025-08-02 RX ORDER — CETIRIZINE HYDROCHLORIDE 10 MG/1
10 TABLET ORAL DAILY
Status: DISCONTINUED | OUTPATIENT
Start: 2025-08-03 | End: 2025-08-06 | Stop reason: HOSPADM

## 2025-08-02 RX ORDER — ACETAMINOPHEN 325 MG/1
650 TABLET ORAL EVERY 6 HOURS PRN
Status: DISCONTINUED | OUTPATIENT
Start: 2025-08-02 | End: 2025-08-06 | Stop reason: HOSPADM

## 2025-08-02 RX ORDER — ALUMINUM HYDROXIDE, MAGNESIUM HYDROXIDE, AND SIMETHICONE 1200; 120; 1200 MG/30ML; MG/30ML; MG/30ML
30 SUSPENSION ORAL EVERY 6 HOURS PRN
Status: DISCONTINUED | OUTPATIENT
Start: 2025-08-02 | End: 2025-08-06 | Stop reason: HOSPADM

## 2025-08-02 RX ORDER — ENOXAPARIN SODIUM 100 MG/ML
40 INJECTION SUBCUTANEOUS EVERY 24 HOURS
Status: DISCONTINUED | OUTPATIENT
Start: 2025-08-03 | End: 2025-08-06 | Stop reason: HOSPADM

## 2025-08-02 RX ORDER — ATORVASTATIN CALCIUM 10 MG/1
10 TABLET, FILM COATED ORAL DAILY
Status: DISCONTINUED | OUTPATIENT
Start: 2025-08-03 | End: 2025-08-06 | Stop reason: HOSPADM

## 2025-08-02 RX ORDER — PANTOPRAZOLE SODIUM 40 MG/1
40 TABLET, DELAYED RELEASE ORAL
Status: DISCONTINUED | OUTPATIENT
Start: 2025-08-03 | End: 2025-08-06 | Stop reason: HOSPADM

## 2025-08-02 RX ORDER — BISACODYL 10 MG/1
10 SUPPOSITORY RECTAL DAILY PRN
Status: DISCONTINUED | OUTPATIENT
Start: 2025-08-02 | End: 2025-08-06 | Stop reason: HOSPADM

## 2025-08-02 RX ORDER — TRAMADOL HYDROCHLORIDE 50 MG/1
50 TABLET, FILM COATED ORAL EVERY 8 HOURS PRN
Status: DISCONTINUED | OUTPATIENT
Start: 2025-08-02 | End: 2025-08-06 | Stop reason: HOSPADM

## 2025-08-02 RX ORDER — TALC
3 POWDER (GRAM) TOPICAL NIGHTLY PRN
Status: DISCONTINUED | OUTPATIENT
Start: 2025-08-02 | End: 2025-08-06 | Stop reason: HOSPADM

## 2025-08-02 RX ORDER — METHOCARBAMOL 500 MG/1
500 TABLET, FILM COATED ORAL EVERY 8 HOURS SCHEDULED
Status: DISCONTINUED | OUTPATIENT
Start: 2025-08-02 | End: 2025-08-05

## 2025-08-02 RX ADMIN — ACETAMINOPHEN 650 MG: 325 TABLET ORAL at 08:45

## 2025-08-02 RX ADMIN — ENOXAPARIN SODIUM 40 MG: 100 INJECTION SUBCUTANEOUS at 08:45

## 2025-08-02 RX ADMIN — INSULIN LISPRO 1 UNITS: 100 INJECTION, SOLUTION INTRAVENOUS; SUBCUTANEOUS at 05:03

## 2025-08-02 RX ADMIN — CETIRIZINE HYDROCHLORIDE 10 MG: 10 TABLET ORAL at 08:46

## 2025-08-02 RX ADMIN — METHOCARBAMOL 500 MG: 500 TABLET ORAL at 16:24

## 2025-08-02 RX ADMIN — SACUBITRIL AND VALSARTAN 1 TABLET: 49; 51 TABLET, FILM COATED ORAL at 08:47

## 2025-08-02 RX ADMIN — SENNOSIDES 8.6 MG: 8.6 TABLET, FILM COATED ORAL at 20:29

## 2025-08-02 RX ADMIN — METHOCARBAMOL 500 MG: 500 TABLET ORAL at 06:52

## 2025-08-02 RX ADMIN — ATORVASTATIN CALCIUM 10 MG: 10 TABLET, FILM COATED ORAL at 08:46

## 2025-08-02 RX ADMIN — METHOCARBAMOL 500 MG: 500 TABLET ORAL at 21:35

## 2025-08-02 RX ADMIN — SCOPOLAMINE 1 PATCH: 1.5 PATCH, EXTENDED RELEASE TRANSDERMAL at 08:45

## 2025-08-02 RX ADMIN — PANTOPRAZOLE SODIUM 40 MG: 40 TABLET, DELAYED RELEASE ORAL at 06:52

## 2025-08-02 RX ADMIN — ACETAMINOPHEN 650 MG: 325 TABLET ORAL at 21:35

## 2025-08-02 RX ADMIN — SACUBITRIL AND VALSARTAN 1 TABLET: 49; 51 TABLET, FILM COATED ORAL at 20:29

## 2025-08-02 RX ADMIN — SERTRALINE 50 MG: 50 TABLET, FILM COATED ORAL at 08:45

## 2025-08-02 RX ADMIN — LEVOTHYROXINE SODIUM 25 MCG: 0.03 TABLET ORAL at 05:03

## 2025-08-02 RX ADMIN — ACETAMINOPHEN 650 MG: 325 TABLET ORAL at 16:24

## 2025-08-02 RX ADMIN — METOPROLOL TARTRATE 25 MG: 25 TABLET, FILM COATED ORAL at 08:46

## 2025-08-02 SDOH — SOCIAL STABILITY: SOCIAL INSECURITY: WITHIN THE LAST YEAR, HAVE YOU BEEN HUMILIATED OR EMOTIONALLY ABUSED IN OTHER WAYS BY YOUR PARTNER OR EX-PARTNER?: NO

## 2025-08-02 SDOH — HEALTH STABILITY: PHYSICAL HEALTH: ON AVERAGE, HOW MANY DAYS PER WEEK DO YOU ENGAGE IN MODERATE TO STRENUOUS EXERCISE (LIKE A BRISK WALK)?: 0 DAYS

## 2025-08-02 SDOH — ECONOMIC STABILITY: TRANSPORTATION INSECURITY: IN THE PAST 12 MONTHS, HAS LACK OF TRANSPORTATION KEPT YOU FROM MEDICAL APPOINTMENTS OR FROM GETTING MEDICATIONS?: NO

## 2025-08-02 SDOH — SOCIAL STABILITY: SOCIAL INSECURITY: WITHIN THE LAST YEAR, HAVE YOU BEEN AFRAID OF YOUR PARTNER OR EX-PARTNER?: NO

## 2025-08-02 SDOH — ECONOMIC STABILITY: HOUSING INSECURITY: IN THE LAST 12 MONTHS, WAS THERE A TIME WHEN YOU WERE NOT ABLE TO PAY THE MORTGAGE OR RENT ON TIME?: NO

## 2025-08-02 SDOH — ECONOMIC STABILITY: FOOD INSECURITY: WITHIN THE PAST 12 MONTHS, THE FOOD YOU BOUGHT JUST DIDN'T LAST AND YOU DIDN'T HAVE MONEY TO GET MORE.: NEVER TRUE

## 2025-08-02 SDOH — ECONOMIC STABILITY: INCOME INSECURITY: IN THE PAST 12 MONTHS HAS THE ELECTRIC, GAS, OIL, OR WATER COMPANY THREATENED TO SHUT OFF SERVICES IN YOUR HOME?: NO

## 2025-08-02 SDOH — ECONOMIC STABILITY: HOUSING INSECURITY: AT ANY TIME IN THE PAST 12 MONTHS, WERE YOU HOMELESS OR LIVING IN A SHELTER (INCLUDING NOW)?: NO

## 2025-08-02 SDOH — ECONOMIC STABILITY: HOUSING INSECURITY: IN THE PAST 12 MONTHS, HOW MANY TIMES HAVE YOU MOVED WHERE YOU WERE LIVING?: 0

## 2025-08-02 SDOH — ECONOMIC STABILITY: FOOD INSECURITY: HOW HARD IS IT FOR YOU TO PAY FOR THE VERY BASICS LIKE FOOD, HOUSING, MEDICAL CARE, AND HEATING?: NOT HARD AT ALL

## 2025-08-02 SDOH — HEALTH STABILITY: PHYSICAL HEALTH: ON AVERAGE, HOW MANY MINUTES DO YOU ENGAGE IN EXERCISE AT THIS LEVEL?: 0 MIN

## 2025-08-02 SDOH — ECONOMIC STABILITY: FOOD INSECURITY: WITHIN THE PAST 12 MONTHS, YOU WORRIED THAT YOUR FOOD WOULD RUN OUT BEFORE YOU GOT THE MONEY TO BUY MORE.: NEVER TRUE

## 2025-08-02 ASSESSMENT — PAIN SCALES - GENERAL
PAINLEVEL_OUTOF10: 3
PAINLEVEL_OUTOF10: 2

## 2025-08-02 ASSESSMENT — COGNITIVE AND FUNCTIONAL STATUS - GENERAL
MOVING TO AND FROM BED TO CHAIR: A LITTLE
PERSONAL GROOMING: A LITTLE
CLIMB 3 TO 5 STEPS WITH RAILING: A LOT
DRESSING REGULAR LOWER BODY CLOTHING: A LITTLE
MOBILITY SCORE: 17
HELP NEEDED FOR BATHING: A LITTLE
MOVING FROM LYING ON BACK TO SITTING ON SIDE OF FLAT BED WITH BEDRAILS: A LITTLE
STANDING UP FROM CHAIR USING ARMS: A LITTLE
TURNING FROM BACK TO SIDE WHILE IN FLAT BAD: A LITTLE
WALKING IN HOSPITAL ROOM: A LITTLE
TOILETING: A LITTLE
DRESSING REGULAR UPPER BODY CLOTHING: A LITTLE
DAILY ACTIVITIY SCORE: 19

## 2025-08-02 ASSESSMENT — PAIN - FUNCTIONAL ASSESSMENT
PAIN_FUNCTIONAL_ASSESSMENT: 0-10
PAIN_FUNCTIONAL_ASSESSMENT: 0-10

## 2025-08-02 ASSESSMENT — ACTIVITIES OF DAILY LIVING (ADL): LACK_OF_TRANSPORTATION: NO

## 2025-08-02 NOTE — DISCHARGE SUMMARY
Discharge Diagnosis  Pancreatic mass (HHS-HCC)    Issues Requiring Follow-Up  Distal pancreatectomy and splenectomy postoperative follow up with Dr. Fong 8/8    Test Results Pending At Discharge  Pending Labs       Order Current Status    Amylase, Body Fluid In process    Amylase, Body Fluid In process    Surgical Pathology Exam In process            Hospital Course  69 yr old female w h/o HLD, HTN, CHF, NSTEMI s/p PCI (2007), hypothyroid, endometrial Ca s/p hysterectomy, CKD now s/p DPS on 7/28 with Dr Fong for pancreatic mass.  Transferred to [facility] post-op.  Post-op course uncomplicated.  [place] removed POD 2 and passed TOV.  Diet advanced slowly as tolerated.  IV medication transitioned to oral with adequate PO intake.  L surgical drain removed POD 3. Maintain R surgical drain.  DC to acute rehab POD 5 on extended DVT prophylaxis. Post-splenectomy vaccines given 8/1.  Will follow up in outpatient clinic on 8/8.     Visit Vitals  /52 (BP Location: Left arm, Patient Position: Lying)   Pulse 94   Temp 36.9 °C (98.4 °F) (Temporal)   Resp 18     Vitals:    08/02/25 0432   Weight: 92.3 kg (203 lb 8 oz)       Immunization History   Administered Date(s) Administered    Flu vaccine (IIV4), preservative free *Check age/dose* 11/18/2018    Flu vaccine, quadrivalent, high-dose, preservative free, age 65y+ (FLUZONE) 10/24/2023    Flu vaccine, quadrivalent, no egg protein, age 6 month or greater (FLUCELVAX) 11/09/2019, 10/11/2020, 10/19/2022    Flu vaccine, trivalent, preservative free, HIGH-DOSE, age 65y+ (Fluzone) 11/18/2024, 12/02/2024    Flu vaccine, trivalent, preservative free, age 6 months and greater (Fluarix/Fluzone/Flulaval) 10/12/2015    HiB PRP-T conjugate vaccine (HIBERIX, ACTHIB) 08/01/2025    Influenza, Seasonal, Quadrivalent, Adjuvanted 11/01/2021    Influenza, seasonal, injectable 10/26/2014    Meningococcal ACWY vaccine (MENVEO) 08/01/2025    Meningococcal B vaccine (BEXSERO) 08/01/2025    Moderna  SARS-CoV-2 Vaccination 03/16/2021, 04/13/2021, 11/23/2021    Pneumococcal conjugate vaccine, 20-valent (PREVNAR 20) 11/18/2024, 08/01/2025       Results      Pertinent Physical Exam At Time of Discharge  General: no acute distress  Respiratory: non-labored on RA  CV: non-cyanotic  GI: soft, non-distended, appropriately tender to palpation. Midline with staples in place well approximated without evidence of drainage. R drain with pancreatic fluid  MSK: no evidence of lower extremity edema  Skin: warm and dry  Neuro: alert and conversant    Home Medications     Medication List      START taking these medications     acetaminophen 325 mg tablet; Commonly known as: Tylenol; Take 2 tablets   (650 mg) by mouth every 6 hours during the day.   enoxaparin 40 mg/0.4 mL syringe; Commonly known as: Lovenox; Inject 0.4   mL (40 mg) under the skin once every 24 hours.   methocarbamol 500 mg tablet; Commonly known as: Robaxin; Take 1 tablet   (500 mg) by mouth 4 times a day as needed for muscle spasms.   naloxone 4 mg/0.1 mL nasal spray; Commonly known as: Narcan; Instill 1   spray (4 mg) into 1 nosril if needed for respiratory depression. May   repeat in 2-3 minutes if needed, alternating nostrils   oxyCODONE 5 mg immediate release tablet; Commonly known as: Roxicodone;   Take 1 tablet (5 mg) by mouth every 6 hours if needed for severe pain (7 -   10).   traMADol 50 mg tablet; Commonly known as: Ultram; Take 1 tablet (50 mg)   by mouth every 6 hours if needed for moderate pain (4 - 6).     CONTINUE taking these medications     atorvastatin 10 mg tablet; Commonly known as: Lipitor   cetirizine 10 mg tablet; Commonly known as: ZyrTEC   chlorhexidine 4 % external liquid; Commonly known as: Hibiclens; Apply 1   Application topically once daily. Use per CPM/PAT provided instructions   cholecalciferol 25 mcg (1,000 units) tablet; Commonly known as: Vitamin   D-3   dapagliflozin propanediol 10 mg tablet; Commonly known as: Farxiga; Take    1 tablet (10 mg) by mouth once daily.   Entresto 49-51 mg tablet; Generic drug: sacubitriL-valsartan   levothyroxine 25 mcg tablet; Commonly known as: Synthroid, Levoxyl; Take   1 tablet (25 mcg) by mouth once daily.   metoprolol succinate XL 25 mg 24 hr tablet; Commonly known as: Toprol-XL   NON FORMULARY   omeprazole 20 mg DR capsule; Commonly known as: PriLOSEC   sertraline 50 mg tablet; Commonly known as: Zoloft   Tums 500 mg (200 mg elemental) chewable tablet; Generic drug: calcium   carbonate       Outpatient Follow-Up  Future Appointments   Date Time Provider Department Center   8/8/2025 11:45 AM Jay Fong MD [id number] [facility]   12/2/2025  1:20 PM Mauriec Sierra MD [id number] [place]       Ele Parker MD

## 2025-08-02 NOTE — CARE PLAN
The patient's goals for the shift include      The clinical goals for the shift include Remain  HDS      Problem: Pain - Adult  Goal: Verbalizes/displays adequate comfort level or baseline comfort level  Outcome: Progressing     Problem: Safety - Adult  Goal: Free from fall injury  Outcome: Progressing     Problem: Discharge Planning  Goal: Discharge to home or other facility with appropriate resources  Outcome: Progressing     Problem: Chronic Conditions and Co-morbidities  Goal: Patient's chronic conditions and co-morbidity symptoms are monitored and maintained or improved  Outcome: Progressing     Problem: Nutrition  Goal: Nutrient intake appropriate for maintaining nutritional needs  Outcome: Progressing     Problem: Skin  Goal: Decreased wound size/increased tissue granulation at next dressing change  Outcome: Progressing  Goal: Participates in plan/prevention/treatment measures  Outcome: Progressing  Goal: Prevent/manage excess moisture  Outcome: Progressing  Goal: Prevent/minimize sheer/friction injuries  Outcome: Progressing  Goal: Promote/optimize nutrition  Outcome: Progressing  Goal: Promote skin healing  Outcome: Progressing      97

## 2025-08-02 NOTE — PROGRESS NOTES
08/02/25 0900   Discharge Planning   Living Arrangements Spouse/significant other   Support Systems Spouse/significant other;Family members   Assistance Needed ADLs   Type of Residence Private residence   Home or Post Acute Services Post acute facilities (Rehab/SNF/etc)   Type of Post Acute Facility Services Rehab   Expected Discharge Disposition Rehab  ([place])   Does the patient need discharge transport arranged? Yes   [facility] coordination needed? Yes   Has discharge transport been arranged? Yes   What day is the transport expected? 08/02/25   What time is the transport expected? 1230   Patient Choice   Provider Choice list and CMS website (https://medicare.gov/care-compare#search) for post-acute Quality and Resource Measure Data were provided and reviewed with: Patient   Patient / Family choosing to utilize agency / facility established prior to hospitalization No     Pt is set to discharge this afternoon to [facility]  Transport is confirmed for 1230 with [facility].  Number for report is [phone] and was sent to bedside nurse via secure chat.  SW will follow. Brett Pappas [facility] LSW

## 2025-08-02 NOTE — NURSING NOTE
[facility] arrived to transport patient to [facility]. IV was removed. Patient belongings sent with patient. Report given to nurse at [facility].

## 2025-08-03 LAB
ANION GAP SERPL CALC-SCNC: 14 MMOL/L (ref 10–20)
BUN SERPL-MCNC: 6 MG/DL (ref 6–23)
CALCIUM SERPL-MCNC: 9 MG/DL (ref 8.6–10.3)
CHLORIDE SERPL-SCNC: 99 MMOL/L (ref 98–107)
CO2 SERPL-SCNC: 33 MMOL/L (ref 21–32)
CREAT SERPL-MCNC: 0.51 MG/DL (ref 0.5–1.05)
EGFRCR SERPLBLD CKD-EPI 2021: >90 ML/MIN/1.73M*2
ERYTHROCYTE [DISTWIDTH] IN BLOOD BY AUTOMATED COUNT: 15.3 % (ref 11.5–14.5)
GLUCOSE SERPL-MCNC: 124 MG/DL (ref 74–99)
HCT VFR BLD AUTO: 40.2 % (ref 36–46)
HGB BLD-MCNC: 11.4 G/DL (ref 12–16)
MCH RBC QN AUTO: 24.4 PG (ref 26–34)
MCHC RBC AUTO-ENTMCNC: 28.4 G/DL (ref 32–36)
MCV RBC AUTO: 86 FL (ref 80–100)
NRBC BLD-RTO: 1.1 /100 WBCS (ref 0–0)
PLATELET # BLD AUTO: 573 X10*3/UL (ref 150–450)
POTASSIUM SERPL-SCNC: 3.6 MMOL/L (ref 3.5–5.3)
RBC # BLD AUTO: 4.68 X10*6/UL (ref 4–5.2)
SODIUM SERPL-SCNC: 142 MMOL/L (ref 136–145)
WBC # BLD AUTO: 15 X10*3/UL (ref 4.4–11.3)

## 2025-08-03 PROCEDURE — 2500000001 HC RX 250 WO HCPCS SELF ADMINISTERED DRUGS (ALT 637 FOR MEDICARE OP): Performed by: PHYSICAL MEDICINE & REHABILITATION

## 2025-08-03 PROCEDURE — 2500000004 HC RX 250 GENERAL PHARMACY W/ HCPCS (ALT 636 FOR OP/ED): Performed by: PHYSICAL MEDICINE & REHABILITATION

## 2025-08-03 PROCEDURE — 36415 COLL VENOUS BLD VENIPUNCTURE: CPT | Performed by: PHYSICAL MEDICINE & REHABILITATION

## 2025-08-03 PROCEDURE — 2500000002 HC RX 250 W HCPCS SELF ADMINISTERED DRUGS (ALT 637 FOR MEDICARE OP, ALT 636 FOR OP/ED): Performed by: PHYSICAL MEDICINE & REHABILITATION

## 2025-08-03 PROCEDURE — 1280000001 HC REHAB SEMI-PRIVATE ROOM DAILY

## 2025-08-03 PROCEDURE — 80048 BASIC METABOLIC PNL TOTAL CA: CPT | Performed by: PHYSICAL MEDICINE & REHABILITATION

## 2025-08-03 PROCEDURE — 85027 COMPLETE CBC AUTOMATED: CPT | Performed by: PHYSICAL MEDICINE & REHABILITATION

## 2025-08-03 RX ORDER — POLYETHYLENE GLYCOL 3350 17 G/17G
17 POWDER, FOR SOLUTION ORAL DAILY
Status: DISCONTINUED | OUTPATIENT
Start: 2025-08-03 | End: 2025-08-06 | Stop reason: HOSPADM

## 2025-08-03 RX ADMIN — ATORVASTATIN CALCIUM 10 MG: 10 TABLET, FILM COATED ORAL at 09:12

## 2025-08-03 RX ADMIN — METHOCARBAMOL 500 MG: 500 TABLET ORAL at 14:15

## 2025-08-03 RX ADMIN — SERTRALINE HYDROCHLORIDE 50 MG: 50 TABLET ORAL at 09:12

## 2025-08-03 RX ADMIN — SACUBITRIL AND VALSARTAN 1 TABLET: 49; 51 TABLET, FILM COATED ORAL at 20:03

## 2025-08-03 RX ADMIN — POLYETHYLENE GLYCOL 3350 17 G: 17 POWDER, FOR SOLUTION ORAL at 12:40

## 2025-08-03 RX ADMIN — CETIRIZINE HYDROCHLORIDE 10 MG: 10 TABLET, FILM COATED ORAL at 09:12

## 2025-08-03 RX ADMIN — METHOCARBAMOL 500 MG: 500 TABLET ORAL at 22:10

## 2025-08-03 RX ADMIN — LEVOTHYROXINE SODIUM 25 MCG: 0.03 TABLET ORAL at 06:20

## 2025-08-03 RX ADMIN — ACETAMINOPHEN 650 MG: 325 TABLET ORAL at 20:03

## 2025-08-03 RX ADMIN — SACUBITRIL AND VALSARTAN 1 TABLET: 49; 51 TABLET, FILM COATED ORAL at 09:13

## 2025-08-03 RX ADMIN — ACETAMINOPHEN 650 MG: 325 TABLET ORAL at 14:15

## 2025-08-03 RX ADMIN — PANTOPRAZOLE SODIUM 40 MG: 40 TABLET, DELAYED RELEASE ORAL at 06:20

## 2025-08-03 RX ADMIN — ENOXAPARIN SODIUM 40 MG: 100 INJECTION SUBCUTANEOUS at 09:12

## 2025-08-03 RX ADMIN — ACETAMINOPHEN 650 MG: 325 TABLET ORAL at 09:12

## 2025-08-03 RX ADMIN — TRAMADOL HYDROCHLORIDE 50 MG: 50 TABLET, COATED ORAL at 03:13

## 2025-08-03 ASSESSMENT — PAIN - FUNCTIONAL ASSESSMENT
PAIN_FUNCTIONAL_ASSESSMENT: 0-10

## 2025-08-03 ASSESSMENT — PAIN DESCRIPTION - DESCRIPTORS
DESCRIPTORS: DISCOMFORT
DESCRIPTORS: ACHING
DESCRIPTORS: ACHING

## 2025-08-03 ASSESSMENT — PAIN SCALES - GENERAL
PAINLEVEL_OUTOF10: 2
PAINLEVEL_OUTOF10: 2
PAINLEVEL_OUTOF10: 7
PAINLEVEL_OUTOF10: 0 - NO PAIN
PAINLEVEL_OUTOF10: 2

## 2025-08-03 ASSESSMENT — PAIN DESCRIPTION - LOCATION
LOCATION: ABDOMEN
LOCATION: ABDOMEN

## 2025-08-03 NOTE — CARE PLAN
The patient's goals for the shift include      The clinical goals for the shift include Will remain hemodynamically stable.    Problem: Pain - Adult  Goal: Verbalizes/displays adequate comfort level or baseline comfort level  Outcome: Progressing

## 2025-08-03 NOTE — INDIVIDUALIZED OVERALL PLAN OF CARE NOTE
Individualized Plan of Care:     Primary rehabilitation diagnosis: Pancreas mass.  Status post distal pancreatectomy and bleeding ectomy     [facility] code:  16     Estimated length of stay:   5 days     Medical functional prognosis is good for the patient to be discharged home at an overall modified independent level and contact-guard assist for stairs       Patient/family anticipated outcome:  Home as independent as possible     Functional outcome/goal:  Bed mobility: Modified independent  Transfer sit to stand : Modified independent  Ambulation: Modified independent  Upper extremity dressing: Modified independent  Lower extremity dressing: Modified independent  Stairs: Contact-guard assist  communicate needs and wants as independent as able.       Anticipated interventions:  Therapeutic exercises  Gait Training  Transfer training  Exercises to improve balance and mobility  ADL retraining for grooming, bathing, ADL activities  Exercises to improve strength and endurance            Required therapy:  Physical therapy 90 minutes 5 days/week.  Occupational therapy 90 minutes 5 days/week.       Patient has good potential to be discharged home at an overall modified independent level.  She is having problems with constipation and will be on a bowel program.  She will be seen by a physician to manage her multiple medical comorbidities and is motivated to participate in therapies.    Individualized physician plan of care    1.  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  Hyperlipidemia  Lipitor 10 mg daily     3.  DVT prophylaxis  Lovenox 40 mg every 24 hours     4.  Allergies   Zyrtec 10 mg daily     5.  Hypothyroidism  Synthroid 25 mg  daily     6.  GERD   Protonix 40 mg daily     7.  Congestive heart failure   Entresto 1 tablet twice daily     8.  Anxiety  Zoloft 50 mg daily     9.  Pain management  Robaxin 500 mg every 8 hours  Ultram 50 mg every 8 hours as needed     10.  Constipation  Will use MiraLAX as the Dulcolax tabs have not been effective     11. NELLASAMANTHA  She is on DVT prophylaxis  She is having constipation and will begin a more aggressive bowel program with MiraLAX and monitor for bowel movement every 2 days  There is a risk of infection we will continue to monitor incision she does have a drain tube present which is draining slightly bloody liquid  She is a fall risk will maintain fall precautions

## 2025-08-03 NOTE — H&P
Admission diagnosis:    History Of Present Illness  Dayan Lopez is a 69 y.o. female presenting with debility following a distal pancreatectomy and splenectomy on 7/28.  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  She has a history of hyperlipidemia, hypertension, congestive heart failure, non-STEMI, hypothyroidism, endometrial cancer, hysterectomy, and chronic kidney disease.  She has class II morbid obesity  Lives with  Spouse. RETIRED AND CAN PROVIDE ASSIST.   Type of home /steps House RAN[place]   ADLs / IADLs: IND   Ambulation:  IND   Devices: no device   Baseline Cognition: A&OX3      Current Functional Status:  Eating Independent   Grooming Supervision   Bathing Minimal Assist   UE Dress Supervision   LE Dress Minimal Assist   Toileting Minimal Assist   Toilet Transfer Minimal Assist   Supine to sit Minimal Assist   Sit to stand Minimal Assist   Bed to chair Minimal Assist   Ambulation 60 FEET , W/W device, Minimal Assist                 Cognition A&OX3   Bladder & Bowel CONT         Past Medical History  She has a past medical history of Allergic (1979), Anxiety (2008), Benign paroxysmal positional vertigo (07/28/2009), Chronic kidney disease (12/2024), Coronary artery disease (07/2007), COVID, Dizziness (About 7 years ago), Endometrial cancer (Multi) (10/24/2023), Epistaxis (ER 11/23. [facility]), Fractures (2000(?)), GERD (gastroesophageal reflux disease) (1990?), Heart disease (2007), Hernia, internal (During my routine colonoscopies- 10 years or so), Hypertension (2007), Hypothyroidism (11/2023), MI (myocardial infarction) (Multi) (03/18/2013), Old myocardial infarction, Personal history of malignant neoplasm of other parts of uterus, Personal history of other diseases of the digestive system, Personal history of other specified conditions, and Uterine cancer (Multi) (10/24/2023).    Surgical History  She  has a past surgical history that includes Hysterectomy (12/05/2014); Other surgical history (12/05/2014); Other surgical history (12/05/2014); Colonoscopy (10/11/2019); Coronary angioplasty with stent (2007); Cardiac catheterization (07/2007); and Fracture surgery (04/2000).  Status post pancreatectomy and splenectomy on 7/28     Social History  She reports that she has never smoked. She has never used smokeless tobacco. She reports that she does not currently use alcohol after a past usage of about 1.0 standard drink of alcohol per week. She reports that she does not use drugs.    She lives with her son and was independent prior to this event     Allergies  Demerol [meperidine] and Meperidine (pf)    Medications  Current Medications[1]     Labs  Admission on 08/02/2025   Component Date Value Ref Range Status    WBC 08/03/2025 15.0 (H)  4.4 - 11.3 x10*3/uL Final    nRBC 08/03/2025 1.1 (H)  0.0 - 0.0 /100 WBCs Final    RBC 08/03/2025 4.68  4.00 - 5.20 x10*6/uL Final    Hemoglobin 08/03/2025 11.4 (L)  12.0 - 16.0 g/dL Final    Hematocrit 08/03/2025 40.2  36.0 - 46.0 % Final    MCV 08/03/2025 86  80 - 100 fL Final    MCH 08/03/2025 24.4 (L)  26.0 - 34.0 pg Final    MCHC 08/03/2025 28.4 (L)  32.0 - 36.0 g/dL Final    RDW 08/03/2025 15.3 (H)  11.5 - 14.5 % Final    Platelets 08/03/2025 573 (H)  150 - 450 x10*3/uL Final    Glucose 08/03/2025 124 (H)  74 - 99 mg/dL Final    Sodium 08/03/2025 142  136 - 145 mmol/L Final    Potassium 08/03/2025 3.6  3.5 - 5.3 mmol/L Final    Chloride 08/03/2025 99  98 - 107 mmol/L Final    Bicarbonate 08/03/2025 33 (H)  21 - 32 mmol/L Final    Anion Gap 08/03/2025 14  10 - 20 mmol/L Final    Urea Nitrogen 08/03/2025 6  6 - 23 mg/dL Final    Creatinine 08/03/2025 0.51  0.50 - 1.05 mg/dL Final    eGFR 08/03/2025 >90  >60 mL/min/1.73m*2 Final    Calculations of estimated GFR are performed using the 2021 CKD-EPI Study Refit equation without the race variable for the IDMS-Traceable creatinine  methods.  https://jasn.asnjournals.org/content/early/2021/09/22/ASN.3100924332    Calcium 08/03/2025 9.0  8.6 - 10.3 mg/dL Final   Admission on 07/28/2025, Discharged on 08/02/2025   Component Date Value Ref Range Status    ABO TYPE 07/28/2025 O   Final    Rh TYPE 07/28/2025 POS   Final    POCT pH, Arterial 07/28/2025 7.42  7.38 - 7.42 pH Final    POCT pCO2, Arterial 07/28/2025 33 (L)  38 - 42 mm Hg Final    POCT pO2, Arterial 07/28/2025 130 (H)  85 - 95 mm Hg Final    POCT SO2, Arterial 07/28/2025 100  94 - 100 % Final    POCT Oxy Hemoglobin, Arterial 07/28/2025 97.7  94.0 - 98.0 % Final    POCT Hematocrit Calculated, Arteri* 07/28/2025 29.0 (L)  36.0 - 46.0 % Final    POCT Sodium, Arterial 07/28/2025 138  136 - 145 mmol/L Final    POCT Potassium, Arterial 07/28/2025 3.6  3.5 - 5.3 mmol/L Final    POCT Chloride, Arterial 07/28/2025 113 (H)  98 - 107 mmol/L Final    POCT Ionized Calcium, Arterial 07/28/2025 1.03 (L)  1.10 - 1.33 mmol/L Final    POCT Glucose, Arterial 07/28/2025 100 (H)  74 - 99 mg/dL Final    POCT Lactate, Arterial 07/28/2025 1.1  0.4 - 2.0 mmol/L Final    POCT Base Excess, Arterial 07/28/2025 -2.6 (L)  -2.0 - 3.0 mmol/L Final    POCT HCO3 Calculated, Arterial 07/28/2025 21.4 (L)  22.0 - 26.0 mmol/L Final    POCT Hemoglobin, Arterial 07/28/2025 9.8 (L)  12.0 - 16.0 g/dL Final    POCT Anion Gap, Arterial 07/28/2025 7 (L)  10 - 25 mmo/L Final    Patient Temperature 07/28/2025 37.0  degrees Celsius Final    FiO2 07/28/2025 50  % Final    POCT Glucose 07/28/2025 172 (H)  74 - 99 mg/dL Final    WBC 07/29/2025 19.3 (H)  4.4 - 11.3 x10*3/uL Final    nRBC 07/29/2025 0.0  0.0 - 0.0 /100 WBCs Final    RBC 07/29/2025 4.16  4.00 - 5.20 x10*6/uL Final    Hemoglobin 07/29/2025 10.2 (L)  12.0 - 16.0 g/dL Final    Hematocrit 07/29/2025 35.6 (L)  36.0 - 46.0 % Final    MCV 07/29/2025 86  80 - 100 fL Final    MCH 07/29/2025 24.5 (L)  26.0 - 34.0 pg Final    MCHC 07/29/2025 28.7 (L)  32.0 - 36.0 g/dL Final    RDW  07/29/2025 15.3 (H)  11.5 - 14.5 % Final    Platelets 07/29/2025 294  150 - 450 x10*3/uL Final    Glucose 07/29/2025 132 (H)  74 - 99 mg/dL Final    Sodium 07/29/2025 138  136 - 145 mmol/L Final    Potassium 07/29/2025 4.5  3.5 - 5.3 mmol/L Final    Chloride 07/29/2025 102  98 - 107 mmol/L Final    Bicarbonate 07/29/2025 26  21 - 32 mmol/L Final    Anion Gap 07/29/2025 15  10 - 20 mmol/L Final    Urea Nitrogen 07/29/2025 17  6 - 23 mg/dL Final    Creatinine 07/29/2025 0.85  0.50 - 1.05 mg/dL Final    eGFR 07/29/2025 75  >60 mL/min/1.73m*2 Final    Calculations of estimated GFR are performed using the 2021 CKD-EPI Study Refit equation without the race variable for the IDMS-Traceable creatinine methods.  https://jasn.asnjournals.org/content/early/2021/09/22/ASN.0905061303    Calcium 07/29/2025 8.5 (L)  8.6 - 10.6 mg/dL Final    Albumin 07/29/2025 3.3 (L)  3.4 - 5.0 g/dL Final    Alkaline Phosphatase 07/29/2025 47  33 - 136 U/L Final    Total Protein 07/29/2025 5.7 (L)  6.4 - 8.2 g/dL Final    AST 07/29/2025 33  9 - 39 U/L Final    Bilirubin, Total 07/29/2025 0.6  0.0 - 1.2 mg/dL Final    ALT 07/29/2025 19  7 - 45 U/L Final    Patients treated with Sulfasalazine may generate falsely decreased results for ALT.    Phosphorus 07/29/2025 5.4 (H)  2.5 - 4.9 mg/dL Final    Amylase 07/29/2025 65  29 - 103 U/L Final    Magnesium 07/29/2025 1.66  1.60 - 2.40 mg/dL Final    POCT Glucose 07/28/2025 181 (H)  74 - 99 mg/dL Final    POCT Glucose 07/29/2025 172 (H)  74 - 99 mg/dL Final    POCT Glucose 07/29/2025 132 (H)  74 - 99 mg/dL Final    Protime 07/29/2025 12.5 (H)  9.8 - 12.4 seconds Final    INR 07/29/2025 1.1  0.9 - 1.1 Final    aPTT 07/29/2025 25 (L)  26 - 36 seconds Final    POCT Glucose 07/29/2025 150 (H)  74 - 99 mg/dL Final    POCT Glucose 07/29/2025 166 (H)  74 - 99 mg/dL Final    POCT Glucose 07/29/2025 151 (H)  74 - 99 mg/dL Final    Glucose 07/30/2025 133 (H)  74 - 99 mg/dL Final    Sodium 07/30/2025 133 (L)  136 -  145 mmol/L Final    Potassium 07/30/2025 5.1  3.5 - 5.3 mmol/L Final    Chloride 07/30/2025 102  98 - 107 mmol/L Final    Bicarbonate 07/30/2025 23  21 - 32 mmol/L Final    Anion Gap 07/30/2025 13  10 - 20 mmol/L Final    Urea Nitrogen 07/30/2025 19  6 - 23 mg/dL Final    Creatinine 07/30/2025 0.75  0.50 - 1.05 mg/dL Final    eGFR 07/30/2025 87  >60 mL/min/1.73m*2 Final    Calculations of estimated GFR are performed using the 2021 CKD-EPI Study Refit equation without the race variable for the IDMS-Traceable creatinine methods.  https://jasn.asnjournals.org/content/early/2021/09/22/ASN.0441636487    Calcium 07/30/2025 8.2 (L)  8.6 - 10.6 mg/dL Final    Albumin 07/30/2025 3.3 (L)  3.4 - 5.0 g/dL Final    Alkaline Phosphatase 07/30/2025 66  33 - 136 U/L Final    Total Protein 07/30/2025 5.8 (L)  6.4 - 8.2 g/dL Final    AST 07/30/2025 41 (H)  9 - 39 U/L Final    Bilirubin, Total 07/30/2025 0.5  0.0 - 1.2 mg/dL Final    ALT 07/30/2025 24  7 - 45 U/L Final    Patients treated with Sulfasalazine may generate falsely decreased results for ALT.    Magnesium 07/30/2025 2.89 (H)  1.60 - 2.40 mg/dL Final    MILD HEMOLYSIS DETECTED. The result may be falsely elevated due to hemolysis or other interferents. Clinical correlation is recommended. Repeat testing may be considered.    POCT Glucose 07/29/2025 141 (H)  74 - 99 mg/dL Final    POCT Glucose 07/30/2025 167 (H)  74 - 99 mg/dL Final    POCT Glucose 07/30/2025 143 (H)  74 - 99 mg/dL Final    POCT Glucose 07/30/2025 156 (H)  74 - 99 mg/dL Final    POCT Glucose 07/30/2025 151 (H)  74 - 99 mg/dL Final    WBC 07/30/2025 24.7 (H)  4.4 - 11.3 x10*3/uL Final    nRBC 07/30/2025 0.1 (H)  0.0 - 0.0 /100 WBCs Final    RBC 07/30/2025 4.05  4.00 - 5.20 x10*6/uL Final    Hemoglobin 07/30/2025 10.1 (L)  12.0 - 16.0 g/dL Final    Hematocrit 07/30/2025 35.8 (L)  36.0 - 46.0 % Final    MCV 07/30/2025 88  80 - 100 fL Final    MCH 07/30/2025 24.9 (L)  26.0 - 34.0 pg Final    MCHC 07/30/2025 28.2  (L)  32.0 - 36.0 g/dL Final    RDW 07/30/2025 15.6 (H)  11.5 - 14.5 % Final    Platelets 07/30/2025 298  150 - 450 x10*3/uL Final    POCT Glucose 07/30/2025 142 (H)  74 - 99 mg/dL Final    WBC 07/31/2025 20.2 (H)  4.4 - 11.3 x10*3/uL Final    nRBC 07/31/2025 0.1 (H)  0.0 - 0.0 /100 WBCs Final    RBC 07/31/2025 3.77 (L)  4.00 - 5.20 x10*6/uL Final    Hemoglobin 07/31/2025 9.5 (L)  12.0 - 16.0 g/dL Final    Hematocrit 07/31/2025 32.9 (L)  36.0 - 46.0 % Final    MCV 07/31/2025 87  80 - 100 fL Final    MCH 07/31/2025 25.2 (L)  26.0 - 34.0 pg Final    MCHC 07/31/2025 28.9 (L)  32.0 - 36.0 g/dL Final    RDW 07/31/2025 15.3 (H)  11.5 - 14.5 % Final    Platelets 07/31/2025 308  150 - 450 x10*3/uL Final    Glucose 07/31/2025 149 (H)  74 - 99 mg/dL Final    Sodium 07/31/2025 133 (L)  136 - 145 mmol/L Final    Potassium 07/31/2025 4.4  3.5 - 5.3 mmol/L Final    Chloride 07/31/2025 101  98 - 107 mmol/L Final    Bicarbonate 07/31/2025 27  21 - 32 mmol/L Final    Anion Gap 07/31/2025 9 (L)  10 - 20 mmol/L Final    Urea Nitrogen 07/31/2025 16  6 - 23 mg/dL Final    Creatinine 07/31/2025 0.68  0.50 - 1.05 mg/dL Final    eGFR 07/31/2025 >90  >60 mL/min/1.73m*2 Final    Calculations of estimated GFR are performed using the 2021 CKD-EPI Study Refit equation without the race variable for the IDMS-Traceable creatinine methods.  https://jasn.asnjournals.org/content/early/2021/09/22/ASN.8211934734    Calcium 07/31/2025 8.5 (L)  8.6 - 10.6 mg/dL Final    Albumin 07/31/2025 3.4  3.4 - 5.0 g/dL Final    Alkaline Phosphatase 07/31/2025 74  33 - 136 U/L Final    Total Protein 07/31/2025 6.0 (L)  6.4 - 8.2 g/dL Final    AST 07/31/2025 29  9 - 39 U/L Final    Bilirubin, Total 07/31/2025 0.5  0.0 - 1.2 mg/dL Final    ALT 07/31/2025 22  7 - 45 U/L Final    Patients treated with Sulfasalazine may generate falsely decreased results for ALT.    Magnesium 07/31/2025 2.31  1.60 - 2.40 mg/dL Final    Amylase, Fluid 07/31/2025 111  Not established. U/L  Final    POCT Glucose 07/30/2025 138 (H)  74 - 99 mg/dL Final    POCT Glucose 07/31/2025 134 (H)  74 - 99 mg/dL Final    POCT Glucose 07/31/2025 161 (H)  74 - 99 mg/dL Final    POCT Glucose 07/31/2025 130 (H)  74 - 99 mg/dL Final    POCT Glucose 07/31/2025 124 (H)  74 - 99 mg/dL Final    POCT Glucose 07/31/2025 126 (H)  74 - 99 mg/dL Final    WBC 08/01/2025 17.4 (H)  4.4 - 11.3 x10*3/uL Final    nRBC 08/01/2025 1.2 (H)  0.0 - 0.0 /100 WBCs Final    RBC 08/01/2025 3.87 (L)  4.00 - 5.20 x10*6/uL Final    Hemoglobin 08/01/2025 9.6 (L)  12.0 - 16.0 g/dL Final    Hematocrit 08/01/2025 33.6 (L)  36.0 - 46.0 % Final    MCV 08/01/2025 87  80 - 100 fL Final    MCH 08/01/2025 24.8 (L)  26.0 - 34.0 pg Final    MCHC 08/01/2025 28.6 (L)  32.0 - 36.0 g/dL Final    RDW 08/01/2025 15.5 (H)  11.5 - 14.5 % Final    Platelets 08/01/2025 350  150 - 450 x10*3/uL Final    Glucose 08/01/2025 127 (H)  74 - 99 mg/dL Final    Sodium 08/01/2025 139  136 - 145 mmol/L Final    Potassium 08/01/2025 4.5  3.5 - 5.3 mmol/L Final    Chloride 08/01/2025 102  98 - 107 mmol/L Final    Bicarbonate 08/01/2025 29  21 - 32 mmol/L Final    Anion Gap 08/01/2025 13  10 - 20 mmol/L Final    Urea Nitrogen 08/01/2025 13  6 - 23 mg/dL Final    Creatinine 08/01/2025 0.57  0.50 - 1.05 mg/dL Final    eGFR 08/01/2025 >90  >60 mL/min/1.73m*2 Final    Calculations of estimated GFR are performed using the 2021 CKD-EPI Study Refit equation without the race variable for the IDMS-Traceable creatinine methods.  https://jasn.asnjournals.org/content/early/2021/09/22/ASN.9469642773    Calcium 08/01/2025 8.8  8.6 - 10.6 mg/dL Final    Albumin 08/01/2025 3.4  3.4 - 5.0 g/dL Final    Alkaline Phosphatase 08/01/2025 79  33 - 136 U/L Final    Total Protein 08/01/2025 6.2 (L)  6.4 - 8.2 g/dL Final    AST 08/01/2025 24  9 - 39 U/L Final    Bilirubin, Total 08/01/2025 0.6  0.0 - 1.2 mg/dL Final    ALT 08/01/2025 23  7 - 45 U/L Final    Patients treated with Sulfasalazine may generate  "falsely decreased results for ALT.    Magnesium 08/01/2025 1.97  1.60 - 2.40 mg/dL Final    Protime 08/01/2025 11.9  9.8 - 12.4 seconds Final    INR 08/01/2025 1.1  0.9 - 1.1 Final    aPTT 08/01/2025 24 (L)  26 - 36 seconds Final    POCT Glucose 07/31/2025 141 (H)  74 - 99 mg/dL Final    POCT Glucose 08/01/2025 134 (H)  74 - 99 mg/dL Final    POCT Glucose 08/01/2025 132 (H)  74 - 99 mg/dL Final    POCT Glucose 08/01/2025 133 (H)  74 - 99 mg/dL Final    POCT Glucose 08/01/2025 160 (H)  74 - 99 mg/dL Final    POCT Glucose 08/01/2025 153 (H)  74 - 99 mg/dL Final    POCT Glucose 08/01/2025 137 (H)  74 - 99 mg/dL Final    POCT Glucose 08/02/2025 166 (H)  74 - 99 mg/dL Final    POCT Glucose 08/02/2025 174 (H)  74 - 99 mg/dL Final    POCT Glucose 08/02/2025 134 (H)  74 - 99 mg/dL Final        Last Recorded Vitals  Blood pressure 161/71, pulse 71, temperature 36.4 °C (97.5 °F), temperature source Temporal, resp. rate 18, height 1.575 m (5' 2.01\"), weight 93.3 kg (205 lb 11.3 oz), SpO2 93%.      Review of Systems   Constitutional:  Negative for chills, fatigue and fever.   HENT:  Negative for congestion, ear discharge, ear pain and hearing loss.    Eyes:  Negative for pain and discharge.   Respiratory:  Negative for chest tightness, shortness of breath and wheezing.    Cardiovascular:  Negative for chest pain, palpitations and leg swelling.   Gastrointestinal: She is mildly constipated.  She does have some pain along the incision.  Endocrine: Negative for polydipsia and polyphagia.   Genitourinary:  Negative for difficulty urinating, dysuria, frequency and hematuria.   Musculoskeletal:  Negative for back pain, myalgias and neck stiffness.   Skin:  Negative for color change, rash and wound.   Neurological:  Negative for dizziness, seizures, numbness and headaches.   Hematological:  Negative for adenopathy. Does not bruise/bleed easily.   Psychiatric/Behavioral:  Negative for confusion and hallucinations. The patient is not " nervous/anxious.        Physical Exam  Constitutional:       General: she is not in acute distress.     Appearance: Normal appearance.   HENT:      Head: Normocephalic.      Nose: Nose normal.     Eyes:      Extraocular Movements: Extraocular movements intact.      Conjunctiva/sclera: Conjunctivae normal.   Cardiovascular:      Rate and Rhythm: Normal rate and regular rhythm.      Heart sounds: No murmur heard.     No gallop.   Pulmonary:      Breath sounds: Normal breath sounds. No wheezing, rhonchi or rales.   Abdominal:   There is a large abdominal incision extending from above the xiphoid to the lower pubic area.  There is no redness or erythema.  Abdomen is soft.  Bowel sounds are active  A drain tube is present draining serosanguineous liquid  Musculoskeletal:         General: No swelling, tenderness or deformity.      Cervical back: No rigidity or tenderness.   Skin:     General: Skin is warm and dry.      Findings: No rash.   Neurological:      General: No focal deficit present.      Mental Status: she is alert and oriented to person, place, and time.      Cranial Nerves: No cranial nerve deficit.     Psychiatric:         Mood and Affect: Mood normal.      Assessment/Plan   Assessment & Plan  Debility      1.  Status post distal pancreatectomy and splenectomy for possible pancreatic malignancy  Continue to monitor incision for adequate wound healing  Begin physical therapy for strengthening, transfer training, and gait training with an assistive device  Begin occupational therapy for ADL retraining, strengthening, ADL activities    2.  Hyperlipidemia  Lipitor 10 mg daily    3.  DVT prophylaxis  Lovenox 40 mg every 24 hours    4.  Allergies   Zyrtec 10 mg daily    5.  Hypothyroidism  Synthroid 25 mg daily    6.  GERD   Protonix 40 mg daily    7.  Congestive heart failure   Entresto 1 tablet twice daily    8.  Anxiety  Zoloft 50 mg daily    9.  Pain management  Robaxin 500 mg every 8 hours  Ultram 50 mg every  8 hours as needed    10.  Constipation  Will use MiraLAX as the Dulcolax tabs have not been effective    11. NELLAI  She is on DVT prophylaxis  She is having constipation and will begin a more aggressive bowel program with MiraLAX and monitor for bowel movement every 2 days  There is a risk of infection we will continue to monitor incision she does have a drain tube present which is draining slightly bloody liquid  She is a fall risk will maintain fall precautions    Time spent reviewing records, examining patient, and documentation, is 80 minutes       Physician Physical Assessment/ Post admission Evaluation (DANYA)     I have reviewed the preadmission rehabilitation screening. There have been no relevant changes since the preadmission screening;    Rehab Plan of Care   The Interdisciplinary Team will work collaboratively to address the patient problems and goals to be managed by the Individualized Interdisciplinary Plan of Care. See the IPOC note for a complete review of the plan of care.    Medical Necessity:  Dayan Lopez requires, and is capable of participating in, an intensive and coordinated interdisciplinary acute inpatient rehabilitation program. She requires close rehabilitation physician monitoring and management to monitor her complex medical conditions and coordinate rehabilitation care. The patient's rehabilitation goals and medical complexity cannot adequately be managed in a less intensive setting. Potential risks for clinical complications include: Infection, falls, pain management.    Medical Prognosis:  Excellent for continued progress and participation with therapy.      Delicia Branham DO         [1]   Current Facility-Administered Medications   Medication Dose Route Frequency Provider Last Rate Last Admin    acetaminophen (Tylenol) oral liquid 650 mg  650 mg oral q4h PRN Kim Brandon MD        Or    acetaminophen (Tylenol) tablet 650 mg  650 mg oral q6h PRN Kim JONES  MD Roma        acetaminophen (Tylenol) tablet 650 mg  650 mg oral q6h during day Kim A MD [place]   650 mg at 08/03/25 0912    alum-mag hydroxide-simeth (Mylanta) 200-200-20 mg/5 mL oral suspension 30 mL  30 mL oral q6h PRN Kim A MD [place]        atorvastatin (Lipitor) tablet 10 mg  10 mg oral Daily Kim A MD [place]   10 mg at 08/03/25 0912    bisacodyl (Dulcolax) EC tablet 10 mg  10 mg oral Daily PRN Kim A MD [place]        bisacodyl (Dulcolax) suppository 10 mg  10 mg rectal Daily PRN Kim A MD [place]        cetirizine (ZyrTEC) tablet 10 mg  10 mg oral Daily Kim A MD [place]   10 mg at 08/03/25 0912    enoxaparin (Lovenox) syringe 40 mg  40 mg subcutaneous q24h Kim A MD Roma   40 mg at 08/03/25 0912    levothyroxine (Synthroid, Levoxyl) tablet 25 mcg  25 mcg oral Daily Kim A MD Roma   25 mcg at 08/03/25 0620    melatonin tablet 3 mg  3 mg oral Nightly PRN Kim A MD [place]        methocarbamol (Robaxin) tablet 500 mg  500 mg oral q8h OSCAR Kim A MD [place]   500 mg at 08/02/25 2135    pantoprazole (ProtoNix) EC tablet 40 mg  40 mg oral Daily before breakfast Kim A MD [place]   40 mg at 08/03/25 0620    sacubitriL-valsartan (Entresto) 49-51 mg per tablet 1 tablet  1 tablet oral BID Kim A MD [place]   1 tablet at 08/03/25 0913    sennosides (Senokot) tablet 8.6 mg  1 tablet oral Nightly Kim A Domingoatti-Trickett, MD   8.6 mg at 08/02/25 2029    sertraline (Zoloft) tablet 50 mg  50 mg oral Daily Kim Brandon MD   50 mg at 08/03/25 0912    traMADol (Ultram) tablet 50 mg  50 mg oral q8h PRN Kim Brandon MD   50 mg at 08/03/25 0313

## 2025-08-04 PROCEDURE — 2500000004 HC RX 250 GENERAL PHARMACY W/ HCPCS (ALT 636 FOR OP/ED): Performed by: PHYSICAL MEDICINE & REHABILITATION

## 2025-08-04 PROCEDURE — 97161 PT EVAL LOW COMPLEX 20 MIN: CPT | Mod: GP

## 2025-08-04 PROCEDURE — 97530 THERAPEUTIC ACTIVITIES: CPT | Mod: GP,CQ

## 2025-08-04 PROCEDURE — 97110 THERAPEUTIC EXERCISES: CPT | Mod: GP,CQ

## 2025-08-04 PROCEDURE — 97116 GAIT TRAINING THERAPY: CPT | Mod: GP,CQ

## 2025-08-04 PROCEDURE — 97530 THERAPEUTIC ACTIVITIES: CPT | Mod: GO,CO

## 2025-08-04 PROCEDURE — 1280000001 HC REHAB SEMI-PRIVATE ROOM DAILY

## 2025-08-04 PROCEDURE — 97110 THERAPEUTIC EXERCISES: CPT | Mod: GO,CO

## 2025-08-04 PROCEDURE — 97166 OT EVAL MOD COMPLEX 45 MIN: CPT | Mod: GO

## 2025-08-04 PROCEDURE — 2500000002 HC RX 250 W HCPCS SELF ADMINISTERED DRUGS (ALT 637 FOR MEDICARE OP, ALT 636 FOR OP/ED): Performed by: PHYSICAL MEDICINE & REHABILITATION

## 2025-08-04 PROCEDURE — 2500000001 HC RX 250 WO HCPCS SELF ADMINISTERED DRUGS (ALT 637 FOR MEDICARE OP): Performed by: PHYSICAL MEDICINE & REHABILITATION

## 2025-08-04 RX ADMIN — PANTOPRAZOLE SODIUM 40 MG: 40 TABLET, DELAYED RELEASE ORAL at 06:00

## 2025-08-04 RX ADMIN — METHOCARBAMOL 500 MG: 500 TABLET ORAL at 05:59

## 2025-08-04 RX ADMIN — METHOCARBAMOL 500 MG: 500 TABLET ORAL at 21:33

## 2025-08-04 RX ADMIN — POLYETHYLENE GLYCOL 3350 17 G: 17 POWDER, FOR SOLUTION ORAL at 08:22

## 2025-08-04 RX ADMIN — SACUBITRIL AND VALSARTAN 1 TABLET: 49; 51 TABLET, FILM COATED ORAL at 08:20

## 2025-08-04 RX ADMIN — ACETAMINOPHEN 650 MG: 325 TABLET ORAL at 21:33

## 2025-08-04 RX ADMIN — ACETAMINOPHEN 650 MG: 325 TABLET ORAL at 14:49

## 2025-08-04 RX ADMIN — LEVOTHYROXINE SODIUM 25 MCG: 0.03 TABLET ORAL at 05:59

## 2025-08-04 RX ADMIN — SACUBITRIL AND VALSARTAN 1 TABLET: 49; 51 TABLET, FILM COATED ORAL at 21:33

## 2025-08-04 RX ADMIN — SENNOSIDES 8.6 MG: 8.6 TABLET, FILM COATED ORAL at 21:33

## 2025-08-04 RX ADMIN — ATORVASTATIN CALCIUM 10 MG: 10 TABLET, FILM COATED ORAL at 08:21

## 2025-08-04 RX ADMIN — ALUMINUM HYDROXIDE, MAGNESIUM HYDROXIDE, AND DIMETHICONE 30 ML: 200; 20; 200 SUSPENSION ORAL at 17:06

## 2025-08-04 RX ADMIN — ENOXAPARIN SODIUM 40 MG: 100 INJECTION SUBCUTANEOUS at 08:22

## 2025-08-04 RX ADMIN — SERTRALINE HYDROCHLORIDE 50 MG: 50 TABLET ORAL at 08:21

## 2025-08-04 RX ADMIN — ACETAMINOPHEN 650 MG: 325 TABLET ORAL at 08:20

## 2025-08-04 RX ADMIN — CETIRIZINE HYDROCHLORIDE 10 MG: 10 TABLET, FILM COATED ORAL at 08:21

## 2025-08-04 RX ADMIN — METHOCARBAMOL 500 MG: 500 TABLET ORAL at 14:49

## 2025-08-04 ASSESSMENT — BRIEF INTERVIEW FOR MENTAL STATUS (BIMS)
BIMS SUMMARY SCORE: 13
INITIAL REPETITION OF BED BLUE SOCK - FIRST ATTEMPT: 3
WHAT YEAR IS IT: CORRECT
ASKED TO RECALL BLUE: YES, NO CUE REQUIRED
WHAT MONTH IS IT: ACCURATE WITHIN 5 DAYS
WHAT DAY OF THE WEEK IS IT: CORRECT
ASKED TO RECALL BED: NO, COULD NOT RECALL
ASKED TO RECALL SOCK: YES, NO CUE REQUIRED

## 2025-08-04 ASSESSMENT — PAIN SCALES - GENERAL
PAINLEVEL_OUTOF10: 2
PAINLEVEL_OUTOF10: 3
PAINLEVEL_OUTOF10: 1
PAINLEVEL_OUTOF10: 0 - NO PAIN
PAINLEVEL_OUTOF10: 3

## 2025-08-04 ASSESSMENT — PAIN - FUNCTIONAL ASSESSMENT
PAIN_FUNCTIONAL_ASSESSMENT: 0-10

## 2025-08-04 ASSESSMENT — ACTIVITIES OF DAILY LIVING (ADL)
BATHING_ASSISTANCE: MINIMAL
ADL_ASSISTANCE: INDEPENDENT

## 2025-08-04 NOTE — PROGRESS NOTES
8/4/25:  Spoke with patient bedside, introduced self and explained role.  [place].  Prior to admission the patient was independent.  She has a walker and commode at home from a previous broken leg.  Discussed ELOS depends on progress and goals, also discussed setting up therapy teaching with her .  Will continue to update regarding DC planning.  -Wild Menendez RN    8/5/25:  Patient's   present today for therapy teaching.  Patient and  agreeable to DC home tomorrow 8/6.  No follow up therapy recommended. -Wild Menendez RN

## 2025-08-04 NOTE — PROGRESS NOTES
Occupational Therapy    Evaluation    Patient Name: Dayan Lopez  MRN: [id number]  Department: [facility] REHAB  Room: [address] [place] [address]  Today's Date: 8/4/2025  Time Calculation  Start Time: 0830  Stop Time: 0915  Time Calculation (min): 45 min        Assessment:  OT Assessment: decline in adl, transfers, mobility at cga/min a overall; assist for iadl's; could benefit from skilled OT to address above areas for higher indep in home going.  Prognosis: Good  Barriers to Discharge Home: No anticipated barriers  Evaluation/Treatment Tolerance: Patient tolerated treatment well  Medical Staff Made Aware: Yes  End of Session Communication: Bedside nurse  End of Session Patient Position: Up in chair, Alarm on  OT Assessment Results: Decreased ADL status, Decreased upper extremity strength, Decreased safe judgment during ADL, Decreased cognition, Decreased functional mobility, Decreased IADLs  Prognosis: Good  Evaluation/Treatment Tolerance: Patient tolerated treatment well  Medical Staff Made Aware: Yes  Strengths: Housing layout, Living arrangement secure, Premorbid level of function, Support and attitude of living partners, Support of extended family/friends  Plan:  Treatment Interventions: ADL retraining, Functional transfer training, UE strengthening/ROM, Endurance training, Patient/family training, Equipment evaluation/education, Compensatory technique education, Continued evaluation  OT Frequency: 90 min/5 days per week (5 days)  OT Discharge Recommendations:  (anticipated mod I for basic transfers and simple mobility at walker level, intermittent supervision for some adl's, shower transfers, assist for iadl's)  Equipment Recommended upon Discharge: Wheeled walker (reacher, long bath sponge, walker bag)  OT Recommended Transfer Status:  (cga)  OT - OK to Discharge: Yes (when cleared for discharge)  Treatment Interventions: ADL retraining, Functional transfer training, UE strengthening/ROM, Endurance training, Patient/family  training, Equipment evaluation/education, Compensatory technique education, Continued evaluation    Subjective   Current Problem:  No diagnosis found.  OT Visit Info:  OT Received On: 08/04/25  General:  General  Reason for Referral: OT eval and treat. Pancreatic mass s/p distal pancreatectomy and splenectomy  Referred By: Dr. Banks  Past Medical History Relevant to Rehab: HTN, HLD, NSTEMI s/p PCI in 2007, CHF, endometrial CA s/p hysterectomy, CKD  Family/Caregiver Present: No  Prior to Session Communication: Bedside nurse  Patient Position Received: Up in chair, Alarm on  General Comment: Pt sitting in recliner upon arrival, pleasant, cooperative and willing to participate in therapy; appears mildly anxious  Precautions:  Hearing/Visual Limitations: Hearing and vision WFL with glasses.  Medical Precautions: Fall precautions  Post-Surgical Precautions: Abdominal surgery precautions            Pain:  Pain Assessment  Pain Assessment: 0-10  0-10 (Numeric) Pain Score: 3  Pain Type: Surgical pain  Pain Location: Abdomen  Pain Interventions: Repositioned, Distraction, Emotional support (s/p meds from nursing)  Response to Interventions: No change in pain    Objective   Cognition:  Orientation Level: Oriented X4  Following Commands: Follows multistep commands with repetition  Memory: Exceptions to WFL           Home Living:  Type of Home: House (ranch)  Lives With: Spouse (reports supportive sister, also)  Home Adaptive Equipment: Walker rolling or standard  Home Layout: One level, Laundry main level (basement only for storage per report)  Home Access: Stairs to enter with rails  Entrance Stairs-Number of Steps: 3 (through garage)  Bathroom Shower/Tub: Walk-in shower (or jacuzzi tub (high sides)-  uses)  Bathroom Toilet: Handicapped height  Bathroom Equipment: Built-in shower seat, Bedside commode (built in corner seats may not be adequate for needs)  Home Living Comments: pt later reports has commode, after  denying has any other toilet dme  Prior Function:  Level of Peck: Independent with ADLs and functional transfers, Independent with homemaking with ambulation  Receives Help From:  ( does most cooking)  ADL Assistance: Independent  Ambulatory Assistance: Independent  Vocational: Retired  Hand Dominance: Right  Prior Function Comments: drives  IADL History:     ADL:  Eating Assistance: Independent  Grooming Assistance: Stand by  Grooming Deficit: Setup  Bathing Assistance: Minimal  UE Dressing Assistance: Stand by  LE Dressing Assistance: Minimal  Toileting Assistance with Device:  (cga)  Activity Tolerance:  Endurance: Endurance does not limit participation in activity  Bed Mobility/Transfers: Bed Mobility  Bed Mobility:  (min a for logrolling techs supine to/from sit and up to max cues and encouragement)    Transfers  Transfer:  (sit to stand and pivot transfers via wwalker cga and mod cues for hand placement and techs)      Functional Mobility:  Functional Mobility  Functional Mobility Performed:  (simple mobility via wwalker cga ~100 ft, mod cues for walker safety techs)  Sitting Balance:  Static Sitting Balance  Static Sitting-Level of Assistance: Distant supervision  Dynamic Sitting Balance  Dynamic Sitting-Level of Assistance: Close supervision  Standing Balance:  Static Standing Balance  Static Standing-Level of Assistance: Contact guard  Dynamic Standing Balance  Dynamic Standing-Level of Assistance:  (cga/min a)        Vision:Vision - Basic Assessment  Current Vision: Wears glasses all the time  Sensation:  Light Touch: No apparent deficits  Strength:  Strength Comments: bilat shoulder grossly 4-/5; elbow 4/5; needed cues and repetition for following mmt        Hand Function:  Gross Grasp: Functional  Coordination: Functional  Extremities: RUE   RUE : Within Functional Limits and LUE   LUE: Within Functional Limits      Education Documentation  Handouts, taught by Jeanie Mckinney OT at 8/4/2025  10:28 AM.  Learner: Patient  Readiness: Acceptance  Method: Explanation  Response: Verbalizes Understanding, Needs Reinforcement    Home Exercise Program, taught by Jeanie Mckinney OT at 8/4/2025 10:28 AM.  Learner: Patient  Readiness: Acceptance  Method: Explanation  Response: Verbalizes Understanding, Needs Reinforcement    Body Mechanics, taught by Jeanie Mckinney OT at 8/4/2025 10:28 AM.  Learner: Patient  Readiness: Acceptance  Method: Explanation  Response: Verbalizes Understanding, Needs Reinforcement    Precautions, taught by Jeanie Mckinney OT at 8/4/2025 10:28 AM.  Learner: Patient  Readiness: Acceptance  Method: Explanation  Response: Verbalizes Understanding, Needs Reinforcement    ADL Training, taught by Jeanie Mckinney OT at 8/4/2025 10:28 AM.  Learner: Patient  Readiness: Acceptance  Method: Explanation  Response: Verbalizes Understanding, Needs Reinforcement    Education Comments  No comments found.         EDUCATION:  Education  Individual(s) Educated: Patient  Education Provided: Diagnosis & Precautions, Fall precautons, Risk and benefits of OT discussed with patient or other, POC discussed and agreed upon  Plan of Care Discussed and Agreed Upon: yes  Patient Response to Education: Patient/Caregiver Verbalized Understanding of Information  Education Comment: pt reports no concerns regarding sex/intimacy as relates to dx    Goals:  Encounter Problems       Encounter Problems (Active)       OT Problem       LTG - Patient will complete grooming with mod I (Progressing)       Start:  08/04/25    Expected End:  08/09/25            LTG - Patient will complete bathing with sba (Progressing)       Start:  08/04/25    Expected End:  08/09/25            LTG - Patient will complete UE dressing with setup (Progressing)       Start:  08/04/25    Expected End:  08/09/25            LTG - Patient will complete toileting with mod I (Progressing)       Start:  08/04/25    Expected End:  08/09/25            LTG - Patient  will complete commode transfer via device as needed with mod I (Progressing)       Start:  08/04/25    Expected End:  08/09/25            LTG - Patient will complete shower transfer using DME as needed with sba (Progressing)       Start:  08/04/25    Expected End:  08/09/25            LTG - Patient will complete functional mobility via device as needed with supervision or better (Progressing)       Start:  08/04/25    Expected End:  08/09/25            LTG - Patient will complete fridge/kitchen access with sba via wwalker (Progressing)       Start:  08/04/25    Expected End:  08/09/25

## 2025-08-04 NOTE — PROGRESS NOTES
Occupational Therapy    OT Treatment    Patient Name: Dayan Lopez  MRN: [id number]  Department: [facility] REHAB  Room: [address] [place] [address]  Today's Date: 8/4/2025  Time Calculation  Start Time: 1045  Stop Time: 1130  Time Calculation (min): 45 min      Assessment:  End of Session Communication: Bedside nurse  End of Session Patient Position: Up in chair, Alarm on     Plan:  Treatment Interventions: ADL retraining, Functional transfer training, UE strengthening/ROM, Endurance training, Patient/family training, Equipment evaluation/education, Compensatory technique education, Continued evaluation  OT Frequency: 90 min/5 days per week (5 days)  OT Discharge Recommendations:  (anticipated mod I for basic transfers and simple mobility at walker level, intermittent supervision for some adl's, shower transfers, assist for iadl's)  Equipment Recommended upon Discharge: Wheeled walker (reacher, long bath sponge, walker bag)  OT Recommended Transfer Status:  (cga)  OT - OK to Discharge: Yes (when cleared for discharge)  Treatment Interventions: ADL retraining, Functional transfer training, UE strengthening/ROM, Endurance training, Patient/family training, Equipment evaluation/education, Compensatory technique education, Continued evaluation    Subjective   OT Visit Info:   General Visit Info:  General  Prior to Session Communication:  (PT)  Patient Position Received: Up in chair, Alarm on  General Comment: Patient agreeable to OT session  Precautions:  Medical Precautions: Abdominal precautions, Fall precautions  Post-Surgical Precautions: Abdominal surgery precautions  Precautions Comment: R surgical drain  Pain:  Pain Assessment  Pain Assessment: 0-10  0-10 (Numeric) Pain Score: 2  Pain Type: Surgical pain  Pain Location: Abdomen    Objective      Bed Mobility/Transfers: Transfers  Transfer: Yes  Transfer 1  Trials/Comments 1: Sit to stand transfers with CGA. Cues for hand placement    Functional Mobility:  Functional  Mobility  Functional Mobility Performed: Yes  Functional Mobility 1  Comments 1: Functional mobility via ww completing item retrieval using reacher to retrieve items from floor with CGA, min cues for sequencing and safety tips.    Therapy/Activity: Therapeutic Exercise  Therapeutic Exercise Performed: Yes  Therapeutic Exercise Activity 1: bue ther ex using 1# weights completing 2 exercises x 3 sets x 10 reps to promote greater indep with ADLs and transfers.    Shower Transfers  Shower Transfers Comments: Simulating home setup, patient uses ww to simulate transfer in and out of shower over 2.5 inch ledge with CGA and cues for sequencing and safety. Patient transfers on and off seat with arms with CGA. Encouraged seat with arms at discharge or assist from  on and off built in corner seats in shower for safety.  EDUCATION:   Patient; Diagnosis & Precautions;Fall precautons;Risk and benefits of OT discussed with patient or other;POC discussed and agreed upon; Education provided on precautions, precautions during ADLs, potential dme needs.     Goals:  Encounter Problems       Encounter Problems (Active)       OT Problem       LTG - Patient will complete grooming with mod I (Progressing)       Start:  08/04/25    Expected End:  08/09/25            LTG - Patient will complete bathing with sba (Progressing)       Start:  08/04/25    Expected End:  08/09/25            LTG - Patient will complete UE dressing with setup (Progressing)       Start:  08/04/25    Expected End:  08/09/25            LTG - Patient will complete toileting with mod I (Progressing)       Start:  08/04/25    Expected End:  08/09/25            LTG - Patient will complete commode transfer via device as needed with mod I (Progressing)       Start:  08/04/25    Expected End:  08/09/25            LTG - Patient will complete shower transfer using DME as needed with sba (Progressing)       Start:  08/04/25    Expected End:  08/09/25            LTG -  Patient will complete functional mobility via device as needed with supervision or better (Progressing)       Start:  08/04/25    Expected End:  08/09/25            LTG - Patient will complete fridge/kitchen access with sba via wwalker (Progressing)       Start:  08/04/25    Expected End:  08/09/25

## 2025-08-04 NOTE — CONSULTS
"Nutrition Initial Assessment:   Nutrition Assessment    Reason for Assessment: Admission nursing screening (acute rehab protocol; MST=0)    Patient is a 69 y.o. female presenting with debility; E s/p distal pancreatectomy and splenectomy on 7/28/25,  due to pancreas mass, suspicious for malignancy.      Pmhx: HLD, HTN, CHF, NSTEMI, CKD, hysterectomy, hypothyroidism, endometrial cancer    Nutrition History:  Energy Intake: Fair 50-75 %  Pain affecting nutrition status: N/A  Food and Nutrient History: Pt was out of room at time of attempted visit today.  Per review of chart, pt is s/p distal pancreatectomy and splenectomy on 7/28/25,  due to pancreas mass, suspicious for malignancy.  PO intakes PTA to rehab avg 50% of meals. Pt may benefit from ONS to support nutritional needs and healing.  Vitamin/Herbal Supplement Use: Vitamin D3, lutein       Anthropometrics:  Height: 157.5 cm (5' 2.01\")   Weight: 93.3 kg (205 lb 11 oz)   BMI (Calculated): 37.61  IBW/kg (Dietitian Calculated): 50 kg  Percent of IBW: 187 %       Weight History:     Weight Change %:  Weight History / % Weight Change: 8/1/25 94.4kg, 7/31/25 95.1kg, 7/30/25 94.4kg, 7/28/25 84.9kg, 7/18/25 83.5kg, 6/27/25 83kg, 4/24/25 83.5kg, 12/17/24 85.6kg, 11/18/24 79.4kg  Significant Weight Loss: No  Significant Weight Gain: Fluid related    Nutrition Focused Physical Exam Findings:    Subcutaneous Fat Loss:   Defer Subcutaneous Fat Loss Assessment: Defer all  Defer All Reason: unavailable  Muscle Wasting:  Defer Muscle Wasting Assessment: Defer all  Defer All Reason: unavailable  Edema:  Edema Location: non pitting generalized  Physical Findings:  Skin: Positive (surgical wound to abdomen)  Digestive System Findings: Loose stool    Nutrition Significant Labs:  CBC Trend:   Results from last 7 days   Lab Units 08/03/25  0649 08/01/25  0638 07/31/25  0613 07/30/25  1543   WBC AUTO x10*3/uL 15.0* 17.4* 20.2* 24.7*   RBC AUTO x10*6/uL 4.68 3.87* 3.77* 4.05 "   HEMOGLOBIN g/dL 11.4* 9.6* 9.5* 10.1*   HEMATOCRIT % 40.2 33.6* 32.9* 35.8*   MCV fL 86 87 87 88   PLATELETS AUTO x10*3/uL 573* 350 308 298    , BMP Trend:   Results from last 7 days   Lab Units 08/03/25  0649 08/01/25  0638 07/31/25  0613 07/30/25  1120   GLUCOSE mg/dL 124* 127* 149* 133*   CALCIUM mg/dL 9.0 8.8 8.5* 8.2*   SODIUM mmol/L 142 139 133* 133*   POTASSIUM mmol/L 3.6 4.5 4.4 5.1   CO2 mmol/L 33* 29 27 23   CHLORIDE mmol/L 99 102 101 102   BUN mg/dL 6 13 16 19   CREATININE mg/dL 0.51 0.57 0.68 0.75    , A1C:  Lab Results   Component Value Date    HGBA1C 6.0 (H) 12/12/2024   , BG POCT trend:   Results from last 7 days   Lab Units 08/02/25  0808 08/02/25  0436 08/02/25  0114 08/01/25  2133 08/01/25  1628   POCT GLUCOSE mg/dL 134* 174* 166* 137* 153*        Nutrition Specific Medications:  Reviewed     I/O:   Last BM Date: 08/04/25; Stool Appearance: Soft, Loose (08/04/25 0708)    Dietary Orders (From admission, onward)       Start     Ordered    08/04/25 1343  Oral nutritional supplements  Until discontinued        Question Answer Comment   Deliver with Dinner    Select supplement: Ensure Max        08/04/25 1342    08/02/25 2030  May Participate in Room Service  ( ROOM SERVICE MAY PARTICIPATE)  Once        Question:  .  Answer:  Yes    08/02/25 2029 08/02/25 1440  Adult diet Regular  Diet effective now        Question:  Diet type  Answer:  Regular    08/02/25 1441                     Estimated Needs:      Method for Estimating Needs: 1500-1750kcals (30-35kcals/kg IBW)     Method for Estimating 24 Hour Protein Needs: 60-75g (1.2-1.5g/kg IBW)     Method for Estimating 24 Hour Fluid Needs: 1 mL/kcal or as per MD  Patient on Order Fluid Restriction: No        Nutrition Diagnosis   Malnutrition Diagnosis  Patient has Malnutrition Diagnosis:  (UTD)    Nutrition Diagnosis  Patient has Nutrition Diagnosis: Yes  Diagnosis Status (1): New  Nutrition Diagnosis 1: Inadequate oral intake  Related to (1): acute  illness  As Evidenced by (1): avg 50% of meals  Additional Nutrition Diagnosis: Diagnosis 2  Diagnosis Status (2): New  Nutrition Diagnosis 2: Increased nutrient needs  Related to (2): physiological causes increasing nutrient needs  As Evidenced by (2): wound healing needs and acute rehab demands       Nutrition Interventions/Recommendations   Nutrition prescription for oral nutrition    Nutrition Recommendations:  Individualized Nutrition Prescription Provided for : Regular diet as ordered; Will order ensure Max once daily    Nutrition Interventions/Goals:   Meals and Snacks: General healthful diet  Goal: consume 3 meals per day  Medical Food Supplement: Commercial beverage medical food supplement therapy  Goal: consume Ensure max once daily (for an additional 150kcals, 30gm protein each)  Vitamin and Mineral Supplement Therapy: Vitamin D supplement therapy    Education Documentation  Do not anticipate education needs before discharge       Nutrition Monitoring and Evaluation   Intake / Amount of food: Consumes at least 75% or more of meals/snacks/supplements, Consumes at least 50% or more of meals/snacks/supplements, Meets > 75% estimated energy needs    Body Weight: Body weight - Maintain stable weight, Body weight - Weight reduction from fluids, as needed    Electrolyte and Renal Panel: Electrolytes within normal limits  Glucose/Endocrine Profile: Glucose within normal limits ( mg/dL)    Other: promote healing through adequate nutrition    Goal Status: New goal(s) identified    Time Spent (min): 45 minutes

## 2025-08-04 NOTE — PROGRESS NOTES
Physical Therapy    Physical Therapy Treatment    Patient Name: Dayan Lopez  MRN: [id number]  Department: [facility] REHAB  Room: [address] [place] [address]  Today's Date: 8/4/2025  Time Calculation  Start Time: 1000  Stop Time: 1045  Time Calculation (min): 45 min         Assessment/Plan   PT Assessment  End of Session Communication:  (Passed off to OT.)  End of Session Patient Position: Up in chair, Alarm on     PT Plan  Treatment/Interventions: Bed mobility, Transfer training, Gait training, Stair training, Balance training, Strengthening, Endurance training, Range of motion, Therapeutic exercise, Therapeutic activity, Home exercise program  PT Plan: Ongoing PT  PT Frequency: 90 min/5 days per week (for 5 days)  PT Discharge Recommendations:  (Anticipate pt to be mod I at walker level with SBA for stair negotiation)  Equipment Recommended upon Discharge: Wheeled walker  PT Recommended Transfer Status: Contact guard (at time of initial evaluation)    PT Visit Info:  PT Received On: 08/04/25     General Visit Information:   General  Prior to Session Communication:  (PT)  Patient Position Received: Up in chair, Alarm on  General Comment: Pt  pleasant, cooperative and willing to participate in PT session.    Subjective   Precautions:  Precautions  Hearing/Visual Limitations: Hearing and vision WFL with glasses.  Medical Precautions: Abdominal precautions, Fall precautions  Post-Surgical Precautions: Abdominal surgery precautions  Precautions Comment:  (R side drain.)            Objective   Pain:  Pain Assessment  Pain Assessment: 0-10  0-10 (Numeric) Pain Score: 1  Pain Type: Surgical pain  Pain Location: Abdomen (Incisional pain)  Pain Interventions: Distraction, Emotional support  Response to Interventions: No change in pain  Cognition:  Cognition  Overall Cognitive Status: Within Functional Limits           Treatments:  Therapeutic Exercise  Therapeutic Exercise Performed: Yes (Pt performed BLE seated therex consisting of: heel/toe  raises, hip flexion, LAQ, HS, GS, hip ADD isometrics with ball, ABD 2x10 reps each in order to improve strength and functional mobility.)    Ambulation/Gait Training  Ambulation/Gait Training Performed: Yes (Pt able to amb 75'x2 over tile/threshold/carpet with FWW and CGA; demos slow, steady sandra with partial / step -through gait pattern.  Denies dizziness and no LOB noted.  Seated rest break b/t trials d/t fatigue.)  Transfers  Transfer: Yes (Pt performed sit<>stand from multiple heights and surfaces with FWW and CGA; v/c for proper hand placement.)     Object From Floor  Comments:  (Pt able to amb 100' with FWW while picking up washcloth off floor with reacher alternating UE with unilateral support and CGA. Demos and v/c provided for technique and safety with pt carryover.)    Education Documentation  No documentation found.  Education Comments  Patient educated in safe techniques for gait with a device in order to maximize safety and functional independence.  Patient educated in safe and proper transfer techniques including proper positioning and hand/foot placement to maximize safety and functional independence.  Pt educated on therex, including optimal techniques to maximize function.          OP EDUCATION:       Encounter Problems       Encounter Problems (Active)       PT Problem       LTG - Pt will perform bed mobility with supervision, cues for log roll technique (Progressing)       Start:  08/04/25    Expected End:  08/08/25            LTG - Pt will perform transfers with mod I.  (Progressing)       Start:  08/04/25    Expected End:  08/08/25            LTG - Pt will amb 150 feet with FWW and mod I.   (Progressing)       Start:  08/04/25    Expected End:  08/08/25            LTG - Pt will up/down 5 stairs with 1 HR and SBA.  (Progressing)       Start:  08/04/25    Expected End:  08/08/25

## 2025-08-05 PROCEDURE — 97110 THERAPEUTIC EXERCISES: CPT | Mod: GP

## 2025-08-05 PROCEDURE — 97530 THERAPEUTIC ACTIVITIES: CPT | Mod: GO

## 2025-08-05 PROCEDURE — RXMED WILLOW AMBULATORY MEDICATION CHARGE

## 2025-08-05 PROCEDURE — 2500000004 HC RX 250 GENERAL PHARMACY W/ HCPCS (ALT 636 FOR OP/ED): Performed by: PHYSICAL MEDICINE & REHABILITATION

## 2025-08-05 PROCEDURE — 97530 THERAPEUTIC ACTIVITIES: CPT | Mod: GP

## 2025-08-05 PROCEDURE — 97535 SELF CARE MNGMENT TRAINING: CPT | Mod: GO

## 2025-08-05 PROCEDURE — 97116 GAIT TRAINING THERAPY: CPT | Mod: GP

## 2025-08-05 PROCEDURE — 2500000001 HC RX 250 WO HCPCS SELF ADMINISTERED DRUGS (ALT 637 FOR MEDICARE OP): Performed by: PHYSICAL MEDICINE & REHABILITATION

## 2025-08-05 PROCEDURE — 99233 SBSQ HOSP IP/OBS HIGH 50: CPT | Performed by: PHYSICAL MEDICINE & REHABILITATION

## 2025-08-05 PROCEDURE — 1280000001 HC REHAB SEMI-PRIVATE ROOM DAILY

## 2025-08-05 PROCEDURE — 2500000002 HC RX 250 W HCPCS SELF ADMINISTERED DRUGS (ALT 637 FOR MEDICARE OP, ALT 636 FOR OP/ED): Performed by: PHYSICAL MEDICINE & REHABILITATION

## 2025-08-05 RX ORDER — POLYETHYLENE GLYCOL 3350 17 G/17G
17 POWDER, FOR SOLUTION ORAL DAILY
Qty: 510 G | Refills: 0 | Status: SHIPPED | OUTPATIENT
Start: 2025-08-06

## 2025-08-05 RX ORDER — SENNOSIDES 8.6 MG/1
1 TABLET ORAL NIGHTLY
Qty: 30 TABLET | Refills: 0 | Status: SHIPPED | OUTPATIENT
Start: 2025-08-05 | End: 2025-09-05

## 2025-08-05 RX ORDER — TRAMADOL HYDROCHLORIDE 50 MG/1
50 TABLET, FILM COATED ORAL EVERY 8 HOURS PRN
Qty: 6 TABLET | Refills: 0 | Status: SHIPPED | OUTPATIENT
Start: 2025-08-05 | End: 2025-08-08

## 2025-08-05 RX ADMIN — METHOCARBAMOL 500 MG: 500 TABLET ORAL at 07:13

## 2025-08-05 RX ADMIN — ACETAMINOPHEN 650 MG: 325 TABLET ORAL at 09:14

## 2025-08-05 RX ADMIN — SACUBITRIL AND VALSARTAN 1 TABLET: 49; 51 TABLET, FILM COATED ORAL at 20:11

## 2025-08-05 RX ADMIN — ATORVASTATIN CALCIUM 10 MG: 10 TABLET, FILM COATED ORAL at 09:14

## 2025-08-05 RX ADMIN — ACETAMINOPHEN 650 MG: 325 TABLET ORAL at 14:48

## 2025-08-05 RX ADMIN — LEVOTHYROXINE SODIUM 25 MCG: 0.03 TABLET ORAL at 07:13

## 2025-08-05 RX ADMIN — CETIRIZINE HYDROCHLORIDE 10 MG: 10 TABLET, FILM COATED ORAL at 09:13

## 2025-08-05 RX ADMIN — SACUBITRIL AND VALSARTAN 1 TABLET: 49; 51 TABLET, FILM COATED ORAL at 09:14

## 2025-08-05 RX ADMIN — ACETAMINOPHEN 650 MG: 325 TABLET ORAL at 20:07

## 2025-08-05 RX ADMIN — SERTRALINE HYDROCHLORIDE 50 MG: 50 TABLET ORAL at 09:14

## 2025-08-05 RX ADMIN — ENOXAPARIN SODIUM 40 MG: 100 INJECTION SUBCUTANEOUS at 09:14

## 2025-08-05 RX ADMIN — PANTOPRAZOLE SODIUM 40 MG: 40 TABLET, DELAYED RELEASE ORAL at 07:13

## 2025-08-05 SDOH — SOCIAL STABILITY: SOCIAL INSECURITY: DO YOU FEEL ANYONE HAS EXPLOITED OR TAKEN ADVANTAGE OF YOU FINANCIALLY OR OF YOUR PERSONAL PROPERTY?: NO

## 2025-08-05 SDOH — SOCIAL STABILITY: SOCIAL INSECURITY: HAVE YOU HAD ANY THOUGHTS OF HARMING ANYONE ELSE?: NO

## 2025-08-05 SDOH — SOCIAL STABILITY: SOCIAL INSECURITY: WERE YOU ABLE TO COMPLETE ALL THE BEHAVIORAL HEALTH SCREENINGS?: YES

## 2025-08-05 SDOH — SOCIAL STABILITY: SOCIAL INSECURITY: DOES ANYONE TRY TO KEEP YOU FROM HAVING/CONTACTING OTHER FRIENDS OR DOING THINGS OUTSIDE YOUR HOME?: NO

## 2025-08-05 SDOH — SOCIAL STABILITY: SOCIAL INSECURITY: DO YOU FEEL UNSAFE GOING BACK TO THE PLACE WHERE YOU ARE LIVING?: NO

## 2025-08-05 SDOH — ECONOMIC STABILITY: HOUSING INSECURITY: DO YOU FEEL UNSAFE GOING BACK TO THE PLACE WHERE YOU LIVE?: NO

## 2025-08-05 SDOH — SOCIAL STABILITY: SOCIAL INSECURITY: ARE THERE ANY APPARENT SIGNS OF INJURIES/BEHAVIORS THAT COULD BE RELATED TO ABUSE/NEGLECT?: NO

## 2025-08-05 SDOH — SOCIAL STABILITY: SOCIAL INSECURITY: HAS ANYONE EVER THREATENED TO HURT YOUR FAMILY OR YOUR PETS?: NO

## 2025-08-05 SDOH — SOCIAL STABILITY: SOCIAL INSECURITY: ABUSE: ADULT

## 2025-08-05 SDOH — SOCIAL STABILITY: SOCIAL INSECURITY: HAVE YOU HAD THOUGHTS OF HARMING ANYONE ELSE?: NO

## 2025-08-05 SDOH — SOCIAL STABILITY: SOCIAL INSECURITY: ARE YOU OR HAVE YOU BEEN THREATENED OR ABUSED PHYSICALLY, EMOTIONALLY, OR SEXUALLY BY ANYONE?: NO

## 2025-08-05 ASSESSMENT — PAIN SCALES - GENERAL
PAINLEVEL_OUTOF10: 0 - NO PAIN
PAINLEVEL_OUTOF10: 2
PAINLEVEL_OUTOF10: 0 - NO PAIN
PAINLEVEL_OUTOF10: 0 - NO PAIN
PAINLEVEL_OUTOF10: 2

## 2025-08-05 ASSESSMENT — BRIEF INTERVIEW FOR MENTAL STATUS (BIMS)
WHAT MONTH IS IT: ACCURATE WITHIN 5 DAYS
WHAT YEAR IS IT: CORRECT
WHAT DAY OF THE WEEK IS IT: CORRECT
ASKED TO RECALL BED: YES, NO CUE REQUIRED
ASKED TO RECALL SOCK: YES, NO CUE REQUIRED
BIMS SUMMARY SCORE: 15
INITIAL REPETITION OF BED BLUE SOCK - FIRST ATTEMPT: 3
ASKED TO RECALL BLUE: YES, NO CUE REQUIRED

## 2025-08-05 ASSESSMENT — PAIN - FUNCTIONAL ASSESSMENT
PAIN_FUNCTIONAL_ASSESSMENT: 0-10

## 2025-08-05 ASSESSMENT — LIFESTYLE VARIABLES
HOW MANY STANDARD DRINKS CONTAINING ALCOHOL DO YOU HAVE ON A TYPICAL DAY: PATIENT DOES NOT DRINK
HOW OFTEN DO YOU HAVE 6 OR MORE DRINKS ON ONE OCCASION: NEVER
AUDIT-C TOTAL SCORE: 0
AUDIT-C TOTAL SCORE: 0
SKIP TO QUESTIONS 9-10: 1
HOW OFTEN DO YOU HAVE A DRINK CONTAINING ALCOHOL: NEVER

## 2025-08-05 ASSESSMENT — PATIENT HEALTH QUESTIONNAIRE - PHQ9
1. LITTLE INTEREST OR PLEASURE IN DOING THINGS: NOT AT ALL
SUM OF ALL RESPONSES TO PHQ9 QUESTIONS 1 & 2: 0
2. FEELING DOWN, DEPRESSED OR HOPELESS: NOT AT ALL

## 2025-08-05 ASSESSMENT — ACTIVITIES OF DAILY LIVING (ADL)
HOME_MANAGEMENT_TIME_ENTRY: 50
BATHING_LEVEL_OF_ASSISTANCE: SETUP

## 2025-08-05 NOTE — PROGRESS NOTES
Physical Therapy    Physical Therapy Treatment    Patient Name: Dayan Lopez  MRN: [id number]  Department: [facility] REHAB  Room: [address] [place] [address]  Today's Date: 8/5/2025  Time Calculation  Start Time: 0829  Stop Time: 0915  Time Calculation (min): 46 min         Assessment/Plan   PT Assessment  End of Session Communication: Bedside nurse  End of Session Patient Position: Up in chair, Alarm on     PT Plan  Treatment/Interventions: Bed mobility, Transfer training, Gait training, Stair training, Balance training, Strengthening, Endurance training, Range of motion, Therapeutic exercise, Therapeutic activity, Home exercise program  PT Plan: Ongoing PT  PT Frequency: 90 min/5 days per week (for 5 days)  PT Discharge Recommendations:  (Anticipate pt to be mod I at walker level with SBA for stair negotiation)  Equipment Recommended upon Discharge: Wheeled walker  PT Recommended Transfer Status: Contact guard (at time of initial evaluation)    PT Visit Info:  PT Received On: 08/05/25     General Visit Information:   General  Family/Caregiver Present: Yes  Caregiver Feedback: Pt's  present for family training this date  Prior to Session Communication: Bedside nurse  Patient Position Received: Up in chair, Alarm on  General Comment: Pt pleasant and cooperative.    Subjective   Precautions:  Precautions  Medical Precautions: Abdominal precautions, Fall precautions  Post-Surgical Precautions: Abdominal surgery precautions  Precautions Comment: R surgical drain      Objective   Pain:  Pain Assessment  Pain Assessment: 0-10  0-10 (Numeric) Pain Score: 2  Pain Location: Abdomen  Pain Interventions: Distraction, Emotional support    Treatments:  Bed Mobility  Bed Mobility:  (Pt performs sit/supine/sit via log roll technique with SBA, cues for proper technique.)    Ambulation/Gait Training  Ambulation/Gait Training Performed:  (Pt ambulates 150 ft with FWW and mod I.)  Transfers  Transfer:  (Pt performs sit/stand transfers with mod  "I.)    Stairs  Stairs:  (Pt ascends/descends 4 6\" steps with 1 HR and SBA.)    Education and demonstration of simulated car transfer from therapist. Pt performs simulated car transfer on/off elevated mat table with SBA and mod cues.       Education Documentation  Family teaching completed with spouse. Spouse educated on safety recommendations as well as safe mobility techniques related to chair transfers, car transfers, gait, and stairs. Spouse appears to have good understanding of pt's needs at discharge.  Pt given handout for seated home exercise program.   Pt given handout of safety discharge recommendation and daily mobility grid to encourage frequent mobilization at home.        Encounter Problems       Encounter Problems (Active)       PT Problem       LTG - Pt will perform bed mobility with supervision, cues for log roll technique (Progressing)       Start:  08/04/25    Expected End:  08/08/25            LTG - Pt will perform transfers with mod I.  (Progressing)       Start:  08/04/25    Expected End:  08/08/25            LTG - Pt will amb 150 feet with FWW and mod I.   (Progressing)       Start:  08/04/25    Expected End:  08/08/25            LTG - Pt will up/down 5 stairs with 1 HR and SBA.  (Progressing)       Start:  08/04/25    Expected End:  08/08/25                   "

## 2025-08-05 NOTE — CARE PLAN
Pt met all ltg from eval. Pt discharging home mod I for adl/transfers/mobility via wwalker. Will sponge bathe initially at setup.  completed family discharge teaching 8/5/25 and recommendations for commode and reacher given, issued walker bag. Vendor options given for dme/adaptive equipment.    Problem: OT Problem  Goal: LTG - Patient will complete grooming with mod I  Outcome: Met  Goal: LTG - Patient will complete bathing with sba  Outcome: Met  Goal: LTG - Patient will complete UE dressing with setup  Outcome: Met  Goal: LTG - Patient will complete toileting with mod I  Outcome: Met  Goal: LTG - Patient will complete commode transfer via device as needed with mod I  Outcome: Met  Goal: LTG - Patient will complete shower transfer using DME as needed with sba  Outcome: Met  Goal: LTG - Patient will complete functional mobility via device as needed with supervision or better  Outcome: Met  Goal: LTG - Patient will complete fridge/kitchen access with sba via wwalker  Outcome: Met

## 2025-08-05 NOTE — CARE PLAN
The patient's goals for the shift include      The clinical goals for the shift include safety and comfort    Problem: Pain - Adult  Goal: Verbalizes/displays adequate comfort level or baseline comfort level  Outcome: Progressing     Problem: Infection - Adult  Goal: Absence of infection at discharge  Outcome: Progressing  Goal: Absence of infection during hospitalization  Outcome: Progressing  Goal: Absence of fever/infection during anticipated neutropenic period  Outcome: Progressing     Problem: Discharge Planning  Goal: Discharge to home or other facility with appropriate resources  Outcome: Progressing     Problem: Chronic Conditions and Co-morbidities  Goal: Patient's chronic conditions and co-morbidity symptoms are monitored and maintained or improved  Outcome: Progressing     Problem: Nutrition  Goal: Nutrient intake appropriate for maintaining nutritional needs  Outcome: Progressing

## 2025-08-05 NOTE — PROGRESS NOTES
Reason for visit:  POV #1  Ref = Alma López      Adv GI = Dinary     HPI:  July 28 DPS for worrisome pancreatic mass.  Acute Rehab POD 5.  Biochem leak R drain.  Out on extended Lovenox.  Got 1st set vaccines in house.    Mrs Lopez comes with her .  She went home after 3 or 4 days at acute rehab.  Since the time of her discharge from the hospital there has been no vomiting.  Her bowels are moving.  She has had no drainage from her incision.    There have been no fevers or chills.  There have been no new or worsening pains.    Her drain output has been as best I can tell from her 's records 10 to 20 mL/day.  We are sending some for amylase from clinic today    PE: She appears well and is in no distress.  Her abdomen is soft and nontender.  Her incision appears to be healing well.  There is no drainage.  There is some staple line erythema but nothing to me suggestive of infection or cellulitis.  Her drain is secured in place with a scant amount of fluid in the bag.  Her  had emptied it this morning.  He brought that with him and we are sending that for amylase.    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  Her final pathology is not yet back.  I am pleased with her progress thus far and see no worrisome clinical signs for infection.    I will follow-up her drain amylase to determine whether we try to get her back early next week for drain removal or plan simply a follow-up visit at [facility].    My nurse partner will call her on Monday with my plan based on her drain amylase.    We will of course get her to see medical oncology if indicated so by her final pathology.    We will plan her second set of vaccines accordingly.    This note has been dictated with voice recognition software and has not been reviewed for grammar or content errors.    Update 8/9:  drain fluid amylase from clinic is > 6K,  as I expected.  Will have my nurse partner call pt and set up appt with me at 830am on Friday Aug 15 at [facility].  Will have her reinforce to pt/ detailed measurement of drain outputs to bring to clinic.

## 2025-08-05 NOTE — PROGRESS NOTES
"      Inpatient PM&R Progress Note     PATIENT NAME: Dayan Lopez  MRN: [id number]  DATE: 8/5/2025    REHAB DIAGNOSIS: Debility          Assessment / Plan     DAILY UPDATE:  8/5  - Patient progressing well in therapy at a standby assist level  - Pain is controlled with very limited use of tramadol  - Questions answered regarding staples and drains that will be followed up on Friday with her surgeon  - Continue MiraLAX for routine bowel movements.  - Discharge home tomorrow with intermittent support from the .      Assessment/Plan   Assessment & Plan  Debility         Bowel/Bladder  -facilitate daily active BM   -continence of bladder per timed void schedule and rehab nursing  -check PVRs and treat appropriately     DVT prophylaxis  -SCDs and ambulation  -Chemoprophylaxis for DVT until ambulating >200 feet     FEN  -follow BMP and treat appropriately  -monitor oral intake daily                Interval Events     Dayan Lopez is a 69 y.o. female on day 3 of admission presenting with Debility.    Subjective   Patient without complaints.  Her only questions were regarding the staples and the drains.  This will be addressed on Friday with her follow-up with the surgeon.    She is working on ambulating without a walker.  Encouraged her to continue to increase her mobility at home with getting back to her routine activities at a slow pace as able.           Physical Examination     /63 (BP Location: Right arm, Patient Position: Sitting)   Pulse 95   Temp 36.2 °C (97.2 °F) (Temporal)   Resp 18   Ht 1.575 m (5' 2.01\")   Wt 93.3 kg (205 lb 11 oz)   SpO2 95%   BMI 37.61 kg/m²       Physical Exam  Pleasant female no apparent distress, alert and oriented x 4.  Chest clear to auscultation bilaterally  Cardiovascular S1-S2  Abdomen soft nontender nondistended.  Right lower Craney quadrant drain in place.  Staples are intact.  Mild erythema and healing around the staple site along the incision " line.    Function: Standby assist questions for you I think answered      Medications     Reviewed MAR     Scheduled medications  Scheduled Medications[1]  Continuous medications  Continuous Medications[2]  PRN medications  PRN Medications[3]       Data     Recent Labs and Imaging Reviewed    Labs:   Results from last 72 hours   Lab Units 08/03/25  0649   SODIUM mmol/L 142   POTASSIUM mmol/L 3.6   CHLORIDE mmol/L 99   BUN mg/dL 6   CREATININE mg/dL 0.51     Results from last 72 hours   Lab Units 08/03/25  0649   WBC AUTO x10*3/uL 15.0*   HEMOGLOBIN g/dL 11.4*   HEMATOCRIT % 40.2   PLATELETS AUTO x10*3/uL 573*       Imaging Results:   [unfilled]    No results found for this or any previous visit from the past 10 days.      Imaging data: I have personally and independently reviewed and interpreted the imaging studies    Therapy notes for the last 24 hours reviewed.    I have personally and independently reviewed and interpreted the laboratory tests, imaging studies, and the documentation from other healthcare providers.    I spent 35 minutes in the professional and overall care of this patient.    Kim Brandno MD       [1] acetaminophen, 650 mg, oral, q6h during day  atorvastatin, 10 mg, oral, Daily  cetirizine, 10 mg, oral, Daily  enoxaparin, 40 mg, subcutaneous, q24h  levothyroxine, 25 mcg, oral, Daily  pantoprazole, 40 mg, oral, Daily before breakfast  polyethylene glycol, 17 g, oral, Daily  sacubitriL-valsartan, 1 tablet, oral, BID  sennosides, 1 tablet, oral, Nightly  sertraline, 50 mg, oral, Daily  [2]    [3] PRN medications: acetaminophen **OR** acetaminophen, alum-mag hydroxide-simeth, bisacodyl, bisacodyl, melatonin, traMADol

## 2025-08-05 NOTE — PROGRESS NOTES
Physical Therapy    Physical Therapy Treatment    Patient Name: Dayan Lopez  MRN: [id number]  Department: [facility] REHAB  Room: [address] [place], [place] [address]  Today's Date: 8/5/2025  Time Calculation  Start Time: 1300  Stop Time: 1345  Time Calculation (min): 45 min         Assessment/Plan   PT Assessment  End of Session Communication: Bedside nurse  End of Session Patient Position: Up in chair, Alarm on     PT Plan  Treatment/Interventions: Bed mobility, Transfer training, Gait training, Stair training, Balance training, Strengthening, Endurance training, Range of motion, Therapeutic exercise, Therapeutic activity, Home exercise program  PT Plan: Ongoing PT  PT Frequency: 90 min/5 days per week (for 5 days)  PT Discharge Recommendations:  (Anticipate pt to be mod I at walker level with SBA for stair negotiation)  Equipment Recommended upon Discharge: Wheeled walker  PT Recommended Transfer Status: Contact guard (at time of initial evaluation)    PT Visit Info:  PT Received On: 08/05/25     General Visit Information:   General  Family/Caregiver Present: Yes  Caregiver Feedback: Pt's  present for family training this date  Prior to Session Communication: Bedside nurse  Patient Position Received: Up in chair, Alarm on  General Comment: Pt noted some leaking around surgical drain site. Nursing and physician notified.    Subjective   Precautions:  Precautions  Medical Precautions: Abdominal precautions, Fall precautions  Post-Surgical Precautions: Abdominal surgery precautions  Precautions Comment: R surgical drain        Objective   Pain:  Pain Assessment  Pain Assessment: 0-10  0-10 (Numeric) Pain Score: 0 - No pain  Pain Location: Abdomen  Pain Interventions: Distraction, Emotional support    Treatments:  Therapeutic Activity  Therapeutic Activity Performed:  (Pt ambulates around PT gym finding 10/10 cones with occasional cues in 3:28 minutes with FWW and supervision.)  Therapeutic Activity 1: Pt ambulates 40 ft x 2 weaving in  and out of 4 standing bolsters x 2 with FWW and supervision, cues for safety.    Ambulation/Gait Training  Ambulation/Gait Training Performed:  (Pt ambulates 200 ft, 150 ft x 2 over tile, carpet and thresholds with FWW and mod I.)  Transfers  Transfer:  (Pt performs sit/stand transfers with mod I.)    Stairs  Stairs:  (Pt ascends/descends 5 steps x 2 with BHR and CGA, pt reports some dizziness/fatigue, unable to complete 3rd rep.)      Education Documentation  Pt educated on techniques for transfers and gait training with device in order to maximize safety and independence.      Encounter Problems       Encounter Problems (Active)       PT Problem       LTG - Pt will perform bed mobility with supervision, cues for log roll technique (Progressing)       Start:  08/04/25    Expected End:  08/08/25            LTG - Pt will perform transfers with mod I.  (Progressing)       Start:  08/04/25    Expected End:  08/08/25            LTG - Pt will amb 150 feet with FWW and mod I.   (Progressing)       Start:  08/04/25    Expected End:  08/08/25            LTG - Pt will up/down 5 stairs with 1 HR and SBA.  (Progressing)       Start:  08/04/25    Expected End:  08/08/25

## 2025-08-05 NOTE — PROGRESS NOTES
Physical Therapy    Physical Therapy Treatment    Patient Name: Dayan Lopez  MRN: [id number]  Department: [facility] RE[facility]  Room: [address] [place] [address]  Today's Date: 8/5/2025  Time Calculation  Start Time: 1000  Stop Time: 1045  Time Calculation (min): 45 min         Assessment/Plan   PT Assessment  End of Session Communication: Bedside nurse  End of Session Patient Position: Up in chair, Alarm on     PT Plan  Treatment/Interventions: Bed mobility, Transfer training, Gait training, Stair training, Balance training, Strengthening, Endurance training, Range of motion, Therapeutic exercise, Therapeutic activity, Home exercise program  PT Plan: Ongoing PT  PT Frequency: 90 min/5 days per week (for 5 days)  PT Discharge Recommendations:  (Anticipate pt to be mod I at walker level with SBA for stair negotiation)  Equipment Recommended upon Discharge: Wheeled walker  PT Recommended Transfer Status: Contact guard (at time of initial evaluation)    PT Visit Info:  PT Received On: 08/05/25     General Visit Information:   General  Family/Caregiver Present: Yes  Caregiver Feedback: Pt's  present for family training this date  Prior to Session Communication: Bedside nurse  Patient Position Received: Up in chair, Alarm on  General Comment: Pt pleasant and cooperative.    Subjective   Precautions:  Precautions  Medical Precautions: Abdominal precautions, Fall precautions  Post-Surgical Precautions: Abdominal surgery precautions  Precautions Comment: R surgical drain        Objective   Pain:  Pain Assessment  Pain Assessment: 0-10  0-10 (Numeric) Pain Score: 2  Pain Location: Abdomen  Pain Interventions: Distraction, Emotional support    Treatments:  Therapeutic Exercise  Therapeutic Exercise Performed: Yes  Pt performs BLE seated AROM exercises: marching, LAQ, hip add isometrics with ball, AP 2 x 10 reps each in order to improve strength and endurance for improvement in functional mobility and transfers.     Ambulation/Gait  Training  Ambulation/Gait Training Performed:  (Pt ambulates 200 ft, 150 ft x 2 over tile, carpet and thresholds with FWW and mod I. Pt ambulates 50 ft without AD with CGA.)  Transfers  Transfer:  (Pt performs sit/stand transfers with mod I.)     Object From Floor  Comments:  (Pt ambulates 50 ft with FWW and picks up 2 wash cloths with use of reacher with mod I.)      Education Documentation  Pt educated on techniques for transfers and gait training with device in order to maximize safety and independence.  Pt educated on therapeutic exercises, including optimal techniques to maximize function.      Encounter Problems       Encounter Problems (Active)       PT Problem       LTG - Pt will perform bed mobility with supervision, cues for log roll technique (Progressing)       Start:  08/04/25    Expected End:  08/08/25            LTG - Pt will perform transfers with mod I.  (Progressing)       Start:  08/04/25    Expected End:  08/08/25            LTG - Pt will amb 150 feet with FWW and mod I.   (Progressing)       Start:  08/04/25    Expected End:  08/08/25            LTG - Pt will up/down 5 stairs with 1 HR and SBA.  (Progressing)       Start:  08/04/25    Expected End:  08/08/25

## 2025-08-05 NOTE — PROGRESS NOTES
Physical Therapy    Time in: 0705  Time out: 0715    Patient performs supine to sit with Supervision, head of bed flat, no rails.  Patient performs sit/stand transfer from bed with SBA.  Cues for proper hand and foot placement for safety.  Patient amb to and from bathroom with use of wheeled walker with SBA. Washes hands at sink with SBA. Patient sitting up in chair with chair alarm enabled and call light in reach.

## 2025-08-05 NOTE — CARE PLAN
Patient achieved 4/4 LTGs established at initial evaluation.  Patient discharged home at mod I walker level with CGA/SBA for stair negotiation.  Family teaching done with spouse on 8/5/25.  Pt given handout for seated home exercise program.  Pt able to demonstrate understanding by complete exercise routine during session.  Pt given handout of safety discharge recommendation and daily mobility grid to encourage frequent mobilization at home. No further follow up PT at this time.      Problem: PT Problem  Goal: LTG - Pt will perform bed mobility with supervision, cues for log roll technique  Outcome: Met  Goal: LTG - Pt will perform transfers with mod I.   Outcome: Met  Goal: LTG - Pt will amb 150 feet with FWW and mod I.    Outcome: Met  Goal: LTG - Pt will up/down 5 stairs with 1 HR and SBA.   Outcome: Met

## 2025-08-05 NOTE — PROGRESS NOTES
Occupational Therapy    OT Treatment    Patient Name: Dayan Lopez  MRN: [id number]  Department: [facility] REHAB  Room: [address] [place] [address]  Today's Date: 8/5/2025  Time Calculation  Start Time: 0744  Stop Time: 0915  Time Calculation (min): 91 min      General Comment: pt seen for individual OT/adl session from 7:44 to 8:29 and then discharge teaching with  8:29to 9:15, shared time with PT.  Assessment:  End of Session Communication: Bedside nurse, Care Coordinator  End of Session Patient Position: Up in chair, Alarm on     Plan:  Treatment Interventions: ADL retraining, Functional transfer training, UE strengthening/ROM, Endurance training, Patient/family training, Equipment evaluation/education, Compensatory technique education, Continued evaluation  OT Frequency: 90 min/5 days per week (5 days)  OT Discharge Recommendations:  (anticipated mod I for basic transfers and simple mobility at walker level, intermittent supervision for some adl's, shower transfers, assist for iadl's)  Equipment Recommended upon Discharge: Wheeled walker (reacher, long bath sponge, walker bag)  OT Recommended Transfer Status:  (cga)  OT - OK to Discharge: Yes (when cleared for discharge)  Treatment Interventions: ADL retraining, Functional transfer training, UE strengthening/ROM, Endurance training, Patient/family training, Equipment evaluation/education, Compensatory technique education, Continued evaluation    Subjective   OT Visit Info: received 8/5/25     General Visit Info:  General  Caregiver Feedback:  present with for discharge teaching during 8:30 to 9:15 session  Prior to Session Communication: Bedside nurse  Patient Position Received: Up in chair, Alarm on    Precautions:  Medical Precautions: Fall precautions  Post-Surgical Precautions: Abdominal surgery precautions  Precautions Comment: R surgical drain            Pain:  Pain Assessment  Pain Assessment: 0-10  0-10 (Numeric) Pain Score:  (reports soreness in abdomen upon  movement, however, not rated)    Objective    Cognition:  Cognition  Overall Cognitive Status: Impaired       Activities of Daily Living: Grooming  Grooming Level of Assistance: Modified independent  Grooming Where Assessed: Sitting sinkside  Grooming Comments: pt reports has not brushed teeth in a week, however,  reports she had brushed teeth daily with him in acute hospital;  reports concerned about memory since surgery.    UE Bathing  UE Bathing Level of Assistance: Setup  UE Bathing Comments: inlcuding shampoo cap    LE Bathing  LE Bathing Level of Assistance: Setup  LE Bathing Where Assessed:  (using figure 4 tech for lower legs/feet)  LE Bathing Comments: reports plans to spongebathe at home due to drain and abdominal incision    UE Dressing  UE Dressing Level of Assistance: Modified independent    LE Dressing  Pants Level of Assistance: Setup  Sock Level of Assistance:  (doffing socks mod I)  Shoe Level of Assistance:  (donning slip on tennis shoes setup)    Toileting  Toileting Level of Assistance: Modified independent  Where Assessed: Toilet, Bedside commode       Bed Mobility/Transfers: Transfers  Transfer:  (mod I sit to stand, commode, chair transfers)      Functional Mobility:  Functional Mobility  Functional Mobility Performed:  (simple mobility via wwalker to/from bathroom, to sink, to closet supervision initially, for walker safety, improved to mod I toward end of session)    EDUCATION:       Goals:  Encounter Problems       Encounter Problems (Active)       OT Problem       LTG - Patient will complete grooming with mod I (Progressing)       Start:  08/04/25    Expected End:  08/09/25            LTG - Patient will complete bathing with sba (Progressing)       Start:  08/04/25    Expected End:  08/09/25            LTG - Patient will complete UE dressing with setup (Progressing)       Start:  08/04/25    Expected End:  08/09/25            LTG - Patient will complete toileting with mod I  (Progressing)       Start:  08/04/25    Expected End:  08/09/25            LTG - Patient will complete commode transfer via device as needed with mod I (Progressing)       Start:  08/04/25    Expected End:  08/09/25            LTG - Patient will complete shower transfer using DME as needed with sba (Progressing)       Start:  08/04/25    Expected End:  08/09/25            LTG - Patient will complete functional mobility via device as needed with supervision or better (Progressing)       Start:  08/04/25    Expected End:  08/09/25            LTG - Patient will complete fridge/kitchen access with sba via wwalker (Progressing)       Start:  08/04/25    Expected End:  08/09/25

## 2025-08-05 NOTE — CARE PLAN
The patient's goals for the shift include      The clinical goals for the shift include patient will remain injury free    Problem: Pain - Adult  Goal: Verbalizes/displays adequate comfort level or baseline comfort level  Outcome: Progressing

## 2025-08-06 ENCOUNTER — PHARMACY VISIT (OUTPATIENT)
Dept: PHARMACY | Facility: CLINIC | Age: 70
End: 2025-08-06
Payer: COMMERCIAL

## 2025-08-06 VITALS
HEIGHT: 62 IN | BODY MASS INDEX: 37.85 KG/M2 | SYSTOLIC BLOOD PRESSURE: 121 MMHG | HEART RATE: 99 BPM | RESPIRATION RATE: 18 BRPM | WEIGHT: 205.69 LBS | DIASTOLIC BLOOD PRESSURE: 63 MMHG | OXYGEN SATURATION: 95 % | TEMPERATURE: 97.3 F

## 2025-08-06 PROCEDURE — 2500000001 HC RX 250 WO HCPCS SELF ADMINISTERED DRUGS (ALT 637 FOR MEDICARE OP): Performed by: PHYSICAL MEDICINE & REHABILITATION

## 2025-08-06 PROCEDURE — 2500000002 HC RX 250 W HCPCS SELF ADMINISTERED DRUGS (ALT 637 FOR MEDICARE OP, ALT 636 FOR OP/ED): Performed by: PHYSICAL MEDICINE & REHABILITATION

## 2025-08-06 PROCEDURE — 97530 THERAPEUTIC ACTIVITIES: CPT | Mod: GP

## 2025-08-06 PROCEDURE — 99239 HOSP IP/OBS DSCHRG MGMT >30: CPT | Performed by: PHYSICAL MEDICINE & REHABILITATION

## 2025-08-06 RX ADMIN — ATORVASTATIN CALCIUM 10 MG: 10 TABLET, FILM COATED ORAL at 08:16

## 2025-08-06 RX ADMIN — CETIRIZINE HYDROCHLORIDE 10 MG: 10 TABLET, FILM COATED ORAL at 08:16

## 2025-08-06 RX ADMIN — PANTOPRAZOLE SODIUM 40 MG: 40 TABLET, DELAYED RELEASE ORAL at 06:26

## 2025-08-06 RX ADMIN — LEVOTHYROXINE SODIUM 25 MCG: 0.03 TABLET ORAL at 06:26

## 2025-08-06 RX ADMIN — SERTRALINE HYDROCHLORIDE 50 MG: 50 TABLET ORAL at 08:16

## 2025-08-06 RX ADMIN — ACETAMINOPHEN 650 MG: 325 TABLET ORAL at 08:16

## 2025-08-06 RX ADMIN — SACUBITRIL AND VALSARTAN 1 TABLET: 49; 51 TABLET, FILM COATED ORAL at 08:16

## 2025-08-06 SDOH — ECONOMIC STABILITY: TRANSPORTATION INSECURITY
IN THE PAST 12 MONTHS, HAS LACK OF TRANSPORTATION KEPT YOU FROM MEETINGS, WORK, OR FROM GETTING THINGS NEEDED FOR DAILY LIVING?: NO

## 2025-08-06 SDOH — ECONOMIC STABILITY: TRANSPORTATION INSECURITY
IN THE PAST 12 MONTHS, HAS THE LACK OF TRANSPORTATION KEPT YOU FROM MEDICAL APPOINTMENTS OR FROM GETTING MEDICATIONS?: NO

## 2025-08-06 ASSESSMENT — BRIEF INTERVIEW FOR MENTAL STATUS (BIMS)
BIMS SUMMARY SCORE: 99
GENERAL FUNCTIONAL COGNITION RATING: INDEPENDENT
WHAT YEAR IS IT: CORRECT
WHAT MONTH IS IT: ACCURATE WITHIN 5 DAYS
GENERAL MEMORY AND RECALL ABILITY: CURRENT SEASON;LOCATION OF OWN ROOM;STAFF NAMES AND FACES;RECOGNIZES APPROPRIATE HEALTHCARE SETTING
WHAT DAY OF THE WEEK IS IT: CORRECT
COGNITIVE PATTERN ASSESSMENT USED: STAFF ASSESSMENT

## 2025-08-06 ASSESSMENT — PATIENT HEALTH QUESTIONNAIRE - PHQ9
SUM OF ALL RESPONSES TO PHQ9 QUESTIONS 1 & 2: 0
1. LITTLE INTEREST OR PLEASURE IN DOING THINGS: NOT AT ALL
2. FEELING DOWN, DEPRESSED OR HOPELESS: NOT AT ALL

## 2025-08-06 ASSESSMENT — ACTIVITIES OF DAILY LIVING (ADL): LACK_OF_TRANSPORTATION: NO

## 2025-08-06 ASSESSMENT — PAIN SCALES - GENERAL: PAINLEVEL_OUTOF10: 0 - NO PAIN

## 2025-08-06 NOTE — PROGRESS NOTES
Physical Therapy    Car transfer training with pt and spouse.  After training, pt performed car transfer mod independent.

## 2025-08-06 NOTE — NURSING NOTE
Consult ordered for general surgery this AM for increase drainage around drain site. Spoke with Natalie General Surgery NP about the concern. NP notes irritation without infection around the drain. Patient is scheduled in 2 days for an outpatient appointment to assess for drain removal. NP secure chatted LPN okay to discharge patient. Patient discharged at 1300.

## 2025-08-06 NOTE — CONSULTS
Reason For Consult  Increased drainage     History Of Present Illness  Dayan Lopez is a 69 y.o. female who was admitted to [facility] on 8/3 after being hospitalized at [facility] on 7/28 for planned open distal pancreatectomy and splenectomy for pancreatic mass. According to discharge summary patient had uneventful postop course.  Plan is for patient to discharge home today.  Surgery asked to evaluate patient because she was concerned with appearance of drain site.    Patient denies fever, chills, nausea, vomiting, or issues moving her bowels. Has some discomfort from midline incision/staples, but no increased pain compared to postop.     There are no recent labs to review. On 8/3 patient had mild leukocytosis, but this was downtrending. She has been afebrile. Mild tachycardia, but normotensive.      Past Medical History  She has a past medical history of Allergic (1979), Anxiety (2008), Benign paroxysmal positional vertigo (07/28/2009), Chronic kidney disease (12/2024), Coronary artery disease (07/2007), COVID, Dizziness (About 7 years ago), Endometrial cancer (Multi) (10/24/2023), Epistaxis (ER 11/23. [facility]), Fractures (2000(?)), GERD (gastroesophageal reflux disease) (1990?), Heart disease (2007), Hernia, internal (During my routine colonoscopies- 10 years or so), Hypertension (2007), Hypothyroidism (11/2023), MI (myocardial infarction) (Multi) (03/18/2013), Old myocardial infarction, Personal history of malignant neoplasm of other parts of uterus, Personal history of other diseases of the digestive system, Personal history of other specified conditions, and Uterine cancer (Multi) (10/24/2023).    Surgical History  She has a past surgical history that includes Hysterectomy (12/05/2014); Other surgical history (12/05/2014); Other surgical history (12/05/2014); Colonoscopy (10/11/2019); Coronary angioplasty with stent (2007); Cardiac catheterization (07/2007); and Fracture surgery  "(04/2000).     Social History  She reports that she has never smoked. She has never used smokeless tobacco. She reports that she does not currently use alcohol after a past usage of about 1.0 standard drink of alcohol per week. She reports that she does not use drugs.    Family History  Family History[1]     Allergies  Demerol [meperidine] and Meperidine (pf)      Physical Exam:  Constitutional:         Adult female   HENT:     MMM  Eyes:      sclera anicteric   Cardiovascular:      Pulses palpable  Pulmonary:     Comfortable WOB  Abdominal:        Midline abdominal incision with staple closure- no skin separation. Healing well. No drainage. RLQ drain in place- gravity drainage. No output in bag. Mild non blanching erythema surrounding insertion site without any purulence or warmth noted.      Skin:      No pallor, no jaundice   Extremities:     No edema. Warm and well perfused   Neurological:      A&O x3  Psychiatric:        Very pleasant and appreciative        Last Recorded Vitals  Blood pressure 121/63, pulse 99, temperature 36.3 °C (97.3 °F), resp. rate 18, height 1.575 m (5' 2.01\"), weight 93.3 kg (205 lb 11 oz), SpO2 95%.         Assessment/Plan         Impression/Recommendations   Superficial irritation to drain insertion site without infection.    Okay for discharge.  Drain education performed at bedside with patient.     Follow up with Dr. Fong in 2 days for anticipated drain removal.    The patient was discussed with the attending surgeon Dr. Lacey who can evaluate patient later this afternoon     I spent 15 minutes in the professional and overall care of this patient.      Natalie Olmstead, APRN-CNP         [1]   Family History  Problem Relation Name Age of Onset    Heart disease Mother Hillary     Osteoporosis Mother Hillary     Stroke Mother Hillary 40 - 49    Liver cancer Father [facility]     Diabetes Father [facility] 50 - 59    Cancer Father [facility] 60 - 69    Heart disease Sister Yesi 50 - " 59    Heart disease Brother      Cancer Brother  50 - 59        hpv oral    Breast cancer Mother's Sister Susan     Cancer Mother's Sister Susan 50 - 59    Heart disease Brother Arturo 60 - 69    Cancer Mother's Brother Robert 60 - 69    Miscarriages / Stillbirths Mother Hillary 20 - 29    Heart disease Brother Rj 60 - 69

## 2025-08-06 NOTE — CARE PLAN
The patient's goals for the shift include      The clinical goals for the shift include safety and comfort         TIA (transient ischemic attack)

## 2025-08-06 NOTE — CARE PLAN
The patient's goals for the shift include      The clinical goals for the shift include safety and comfort    Problem: Infection - Adult  Goal: Absence of infection at discharge  Outcome: Progressing  Goal: Absence of infection during hospitalization  Outcome: Progressing  Goal: Absence of fever/infection during anticipated neutropenic period  Outcome: Progressing     Problem: Discharge Planning  Goal: Discharge to home or other facility with appropriate resources  Outcome: Progressing     Problem: Chronic Conditions and Co-morbidities  Goal: Patient's chronic conditions and co-morbidity symptoms are monitored and maintained or improved  Outcome: Progressing     Problem: Nutrition  Goal: Nutrient intake appropriate for maintaining nutritional needs  Outcome: Progressing

## 2025-08-06 NOTE — PROGRESS NOTES
Patient did have slight more drainage out of the drainage site.  She was seen by general surgery who believes it is not infected and she is safe to be discharged.

## 2025-08-07 NOTE — DISCHARGE SUMMARY
Discharge Diagnosis  Debility           Issues Requiring Follow-Up  S/p pancreatic tumor removal    Discharge Meds     Medication List      START taking these medications     polyethylene glycol 17 gram/dose powder; Commonly known as: Glycolax,   Miralax; Mix 1 capful (17 g) of powder with 4 to 8 ounces of water or   juice and drink once daily.   senna 8.6 mg tablet; Generic drug: sennosides; Take 1 tablet (8.6 mg) by   mouth once daily at bedtime.     CHANGE how you take these medications     traMADol 50 mg tablet; Commonly known as: Ultram; Take 1 tablet (50 mg)   by mouth every 8 hours if needed for severe pain (7 - 10) for up to 3   days.; What changed: when to take this, reasons to take this     CONTINUE taking these medications     acetaminophen 325 mg tablet; Commonly known as: Tylenol; Take 2 tablets   (650 mg) by mouth every 6 hours during the day.   atorvastatin 10 mg tablet; Commonly known as: Lipitor   cetirizine 10 mg tablet; Commonly known as: ZyrTEC   cholecalciferol 25 mcg (1,000 units) tablet; Commonly known as: Vitamin   D-3   Entresto 49-51 mg tablet; Generic drug: sacubitriL-valsartan   levothyroxine 25 mcg tablet; Commonly known as: Synthroid, Levoxyl; Take   1 tablet (25 mcg) by mouth once daily.   metoprolol succinate XL 25 mg 24 hr tablet; Commonly known as: Toprol-XL   NON FORMULARY   omeprazole 20 mg DR capsule; Commonly known as: PriLOSEC   sertraline 50 mg tablet; Commonly known as: Zoloft   Tums 500 mg (200 mg elemental) chewable tablet; Generic drug: calcium   carbonate     STOP taking these medications     chlorhexidine 4 % external liquid; Commonly known as: Hibiclens   dapagliflozin propanediol 10 mg tablet; Commonly known as: Farxiga   enoxaparin 40 mg/0.4 mL syringe; Commonly known as: Lovenox   methocarbamol 500 mg tablet; Commonly known as: Robaxin   naloxone 4 mg/0.1 mL nasal spray; Commonly known as: Narcan   oxyCODONE 5 mg immediate release tablet; Commonly known as:  Roxicodone       Test Results Pending At Discharge  Pending Labs       No current pending labs.            Hospital Course     Dayan Lopez is a 69 y.o. female presenting with debility following a distal pancreatectomy and splenectomy on 7/28.  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  She has a history of hyperlipidemia, hypertension, congestive heart failure, non-STEMI, hypothyroidism, endometrial cancer, hysterectomy, and chronic kidney disease.     She participated in 3 hours of PT/OT daily. She improved to the point that she was supervision for DC home ambulating with a walker.     She will have a follow up for her abdominal surgery this week.    Pertinent Physical Exam At Time of Discharge  Physical Exam  Pleasant female in NAD  CTAB  S1S2  SNTND+BS  Negative Hair's bilaterally  MAEE    Supervision for function    Time spent with the patient today for discharge including: history, physical exam, coordination of care with the rehab team members, communication with patient and/or family, medication reconciliation and documentation was 35 minutes.      Outpatient Follow-Up  Future Appointments   Date Time Provider Department Center   8/8/2025 11:45 AM Jay Fong MD [id number] [place]   8/14/2025 10:00 AM Yesi Milian APRN-CNP [id number] [place]   12/2/2025  1:20 PM Maurice Sierra MD [id number] [place]         Kim Brandon MD

## 2025-08-08 ENCOUNTER — LAB (OUTPATIENT)
Dept: LAB | Facility: HOSPITAL | Age: 70
End: 2025-08-08
Payer: MEDICARE

## 2025-08-08 ENCOUNTER — APPOINTMENT (OUTPATIENT)
Dept: SURGERY | Facility: CLINIC | Age: 70
End: 2025-08-08
Payer: MEDICARE

## 2025-08-08 VITALS
DIASTOLIC BLOOD PRESSURE: 99 MMHG | BODY MASS INDEX: 33.83 KG/M2 | TEMPERATURE: 97.1 F | WEIGHT: 185 LBS | HEART RATE: 120 BPM | SYSTOLIC BLOOD PRESSURE: 143 MMHG

## 2025-08-08 DIAGNOSIS — Z90.411 HISTORY OF PARTIAL PANCREATECTOMY: ICD-10-CM

## 2025-08-08 DIAGNOSIS — K86.89 PANCREATIC MASS (HHS-HCC): ICD-10-CM

## 2025-08-08 DIAGNOSIS — M62.838 MUSCLE SPASM: ICD-10-CM

## 2025-08-08 DIAGNOSIS — K86.89 PANCREATIC MASS (HHS-HCC): Primary | ICD-10-CM

## 2025-08-08 DIAGNOSIS — D37.8 NEOPLASM OF UNCERTAIN BEHAVIOR OF PANCREAS: ICD-10-CM

## 2025-08-08 DIAGNOSIS — C25.9 MALIGNANT NEOPLASM OF PANCREAS, UNSPECIFIED LOCATION OF MALIGNANCY (MULTI): ICD-10-CM

## 2025-08-08 DIAGNOSIS — Z90.81 POST-SPLENECTOMY: ICD-10-CM

## 2025-08-08 DIAGNOSIS — Z90.81 ACQUIRED ABSENCE OF SPLEEN: ICD-10-CM

## 2025-08-08 LAB — AMYLASE FLD-CCNC: >6000 U/L

## 2025-08-08 PROCEDURE — 3077F SYST BP >= 140 MM HG: CPT | Performed by: SURGERY

## 2025-08-08 PROCEDURE — 99024 POSTOP FOLLOW-UP VISIT: CPT | Performed by: SURGERY

## 2025-08-08 PROCEDURE — 1111F DSCHRG MED/CURRENT MED MERGE: CPT | Performed by: SURGERY

## 2025-08-08 PROCEDURE — 82150 ASSAY OF AMYLASE: CPT | Performed by: SURGERY

## 2025-08-08 PROCEDURE — 1159F MED LIST DOCD IN RCRD: CPT | Performed by: SURGERY

## 2025-08-08 PROCEDURE — 3080F DIAST BP >= 90 MM HG: CPT | Performed by: SURGERY

## 2025-08-08 RX ORDER — METHOCARBAMOL 500 MG/1
500 TABLET, FILM COATED ORAL 2 TIMES DAILY PRN
Qty: 20 TABLET | Refills: 0 | Status: SHIPPED | OUTPATIENT
Start: 2025-08-08 | End: 2025-08-18

## 2025-08-11 ENCOUNTER — TELEPHONE (OUTPATIENT)
Dept: SURGERY | Facility: CLINIC | Age: 70
End: 2025-08-11
Payer: MEDICARE

## 2025-08-11 PROBLEM — C25.2 MALIGNANT NEOPLASM OF TAIL OF PANCREAS (MULTI): Status: ACTIVE | Noted: 2025-08-11

## 2025-08-11 LAB
LAB AP ASR DISCLAIMER: NORMAL
LAB AP BLOCK FOR ADDITIONAL STUDIES: NORMAL
LABORATORY COMMENT REPORT: NORMAL
Lab: NORMAL
PATH REPORT.FINAL DX SPEC: NORMAL
PATH REPORT.GROSS SPEC: NORMAL
PATH REPORT.RELEVANT HX SPEC: NORMAL
PATH REPORT.TOTAL CANCER: NORMAL
PATHOLOGY SYNOPTIC REPORT: NORMAL

## 2025-08-14 ENCOUNTER — OFFICE VISIT (OUTPATIENT)
Dept: PRIMARY CARE | Facility: CLINIC | Age: 70
End: 2025-08-14
Payer: MEDICARE

## 2025-08-14 VITALS — HEART RATE: 89 BPM | BODY MASS INDEX: 32.84 KG/M2 | OXYGEN SATURATION: 99 % | TEMPERATURE: 97.3 F | WEIGHT: 179.6 LBS

## 2025-08-14 DIAGNOSIS — R73.09 ELEVATED GLUCOSE: ICD-10-CM

## 2025-08-14 DIAGNOSIS — Z09 HOSPITAL DISCHARGE FOLLOW-UP: Primary | ICD-10-CM

## 2025-08-14 DIAGNOSIS — I10 PRIMARY HYPERTENSION: ICD-10-CM

## 2025-08-14 LAB — POC FINGERSTICK BLOOD GLUCOSE: 167 MG/DL (ref 70–100)

## 2025-08-14 PROCEDURE — 1126F AMNT PAIN NOTED NONE PRSNT: CPT | Performed by: NURSE PRACTITIONER

## 2025-08-14 PROCEDURE — 1159F MED LIST DOCD IN RCRD: CPT | Performed by: NURSE PRACTITIONER

## 2025-08-14 PROCEDURE — 99214 OFFICE O/P EST MOD 30 MIN: CPT | Performed by: NURSE PRACTITIONER

## 2025-08-14 PROCEDURE — 1160F RVW MEDS BY RX/DR IN RCRD: CPT | Performed by: NURSE PRACTITIONER

## 2025-08-14 PROCEDURE — 99212 OFFICE O/P EST SF 10 MIN: CPT

## 2025-08-14 PROCEDURE — 1111F DSCHRG MED/CURRENT MED MERGE: CPT | Performed by: NURSE PRACTITIONER

## 2025-08-14 PROCEDURE — 82962 GLUCOSE BLOOD TEST: CPT | Performed by: NURSE PRACTITIONER

## 2025-08-14 ASSESSMENT — ENCOUNTER SYMPTOMS
DEPRESSION: 0
LOSS OF SENSATION IN FEET: 0
OCCASIONAL FEELINGS OF UNSTEADINESS: 0

## 2025-08-14 ASSESSMENT — PAIN SCALES - GENERAL: PAINLEVEL_OUTOF10: 0-NO PAIN

## 2025-08-15 ENCOUNTER — PATIENT OUTREACH (OUTPATIENT)
Dept: HEMATOLOGY/ONCOLOGY | Facility: CLINIC | Age: 70
End: 2025-08-15

## 2025-08-15 ENCOUNTER — OFFICE VISIT (OUTPATIENT)
Dept: SURGERY | Facility: CLINIC | Age: 70
End: 2025-08-15
Payer: MEDICARE

## 2025-08-15 ENCOUNTER — TELEPHONE (OUTPATIENT)
Dept: HEMATOLOGY/ONCOLOGY | Facility: CLINIC | Age: 70
End: 2025-08-15

## 2025-08-15 VITALS — TEMPERATURE: 96.9 F | OXYGEN SATURATION: 96 % | WEIGHT: 179.8 LBS | HEART RATE: 100 BPM | BODY MASS INDEX: 32.88 KG/M2

## 2025-08-15 DIAGNOSIS — C25.2 MALIGNANT NEOPLASM OF TAIL OF PANCREAS (MULTI): Primary | ICD-10-CM

## 2025-08-15 LAB
NON-UH HIE BUN/CREAT RATIO: 17.8
NON-UH HIE BUN: 16 MG/DL (ref 9–23)
NON-UH HIE CALCIUM: 9.5 MG/DL (ref 8.7–10.4)
NON-UH HIE CALCULATED OSMOLALITY: 277 MOSM/KG (ref 275–295)
NON-UH HIE CHLORIDE: 98 MMOL/L (ref 98–107)
NON-UH HIE CO2, VENOUS: 25 MMOL/L (ref 20–31)
NON-UH HIE CREATININE: 0.9 MG/DL (ref 0.5–0.8)
NON-UH HIE GFR AA: >60
NON-UH HIE GLOMERULAR FILTRATION RATE: >60 ML/MIN/1.73M?
NON-UH HIE GLUCOSE: 138 MG/DL (ref 74–106)
NON-UH HIE HCT: 35.6 % (ref 36–46)
NON-UH HIE HGB: 11.3 G/DL (ref 12–16)
NON-UH HIE INSTR WBC ND: 12.1
NON-UH HIE K: 4.3 MMOL/L (ref 3.5–5.1)
NON-UH HIE MCH: 25.4 PG (ref 27–34)
NON-UH HIE MCHC: 31.8 G/DL (ref 32–37)
NON-UH HIE MCV: 80 FL (ref 80–100)
NON-UH HIE MPV: 8.9 FL (ref 7.4–10.4)
NON-UH HIE NA: 137 MMOL/L (ref 135–145)
NON-UH HIE PLATELET: 882 X10 (ref 150–450)
NON-UH HIE RBC: 4.45 X10 (ref 4.2–5.4)
NON-UH HIE RDW: 15.9 % (ref 11.5–14.5)
NON-UH HIE WBC: 12.1 X10 (ref 4.5–11)

## 2025-08-15 PROCEDURE — 1036F TOBACCO NON-USER: CPT | Performed by: SURGERY

## 2025-08-15 PROCEDURE — 1111F DSCHRG MED/CURRENT MED MERGE: CPT | Performed by: SURGERY

## 2025-08-15 PROCEDURE — 1159F MED LIST DOCD IN RCRD: CPT | Performed by: SURGERY

## 2025-08-15 PROCEDURE — 99024 POSTOP FOLLOW-UP VISIT: CPT | Performed by: SURGERY

## 2025-08-15 PROCEDURE — 1125F AMNT PAIN NOTED PAIN PRSNT: CPT | Performed by: SURGERY

## 2025-08-15 ASSESSMENT — PAIN SCALES - GENERAL: PAINLEVEL_OUTOF10: 2

## 2025-08-17 NOTE — DOCUMENTATION CLARIFICATION NOTE
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    Question: Please document whether you concur or do not concur with the 25 pathology report findings    When answering this query, please exercise your independent professional judgment. The fact that a question is being asked, does not imply that any particular answer is desired or expected.    The patient's clinical indicators include:  Clinical Information:  25 Discharge Summary:  \"69 yr old female w h/o HLD, HTN, CHF, NSTEMI s/p PCI (), hypothyroid, endometrial Ca s/p hysterectomy, CKD now s/p DPS on  with Dr Fong for pancreatic mass.\"    Clinical Indicators and the 25 Pathology Findings:  \"Tumor  Tumor Site:    Pancreatic tail  Histologic Type:    Ductal adenocarcinoma (NOS)  Histologic Grade:    G2, moderately differentiated  Tumor Size:    Greatest Dimension (Centimeters): 3.4 cm  Site(s) Involved by Direct Tumor Extension:    Peripancreatic soft tissues  Treatment Effect:    No known presurgical therapy  Lymphovascular Invasion:    Present  Perineural Invasion:    Present  Tumor Comment:    Large venous invasion identified  Regional Lymph Node Status:  Tumor " "present in regional lymph node(s)      Number of Lymph Nodes with Tumor:    2  Number of Lymph Nodes Examined:    22  Spleen with benign fibrous nodule and fat necrosis in perisplenic tissue, negative for carcinoma.  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

## 2025-09-03 ENCOUNTER — TELEPHONE (OUTPATIENT)
Dept: PRIMARY CARE | Facility: CLINIC | Age: 70
End: 2025-09-03

## 2025-09-03 ENCOUNTER — APPOINTMENT (OUTPATIENT)
Dept: SURGICAL ONCOLOGY | Facility: CLINIC | Age: 70
End: 2025-09-03
Payer: MEDICARE

## 2025-09-03 DIAGNOSIS — E03.9 HYPOTHYROIDISM, UNSPECIFIED TYPE: ICD-10-CM

## 2025-09-03 RX ORDER — LEVOTHYROXINE SODIUM 25 UG/1
25 TABLET ORAL DAILY
Qty: 90 TABLET | Refills: 0 | Status: SHIPPED | OUTPATIENT
Start: 2025-09-03

## 2025-09-03 ASSESSMENT — PAIN SCALES - GENERAL: PAINLEVEL_OUTOF10: 0-NO PAIN

## 2025-09-05 ENCOUNTER — OFFICE VISIT (OUTPATIENT)
Dept: HEMATOLOGY/ONCOLOGY | Facility: CLINIC | Age: 70
End: 2025-09-05
Payer: MEDICARE

## 2025-09-05 VITALS
SYSTOLIC BLOOD PRESSURE: 125 MMHG | DIASTOLIC BLOOD PRESSURE: 76 MMHG | RESPIRATION RATE: 12 BRPM | OXYGEN SATURATION: 90 % | BODY MASS INDEX: 32.72 KG/M2 | HEIGHT: 62 IN | HEART RATE: 118 BPM | TEMPERATURE: 96.8 F | WEIGHT: 177.8 LBS

## 2025-09-05 DIAGNOSIS — C25.2 MALIGNANT NEOPLASM OF TAIL OF PANCREAS (MULTI): Primary | ICD-10-CM

## 2025-09-05 DIAGNOSIS — I25.10 ARTERIOSCLEROSIS OF CORONARY ARTERY: ICD-10-CM

## 2025-09-05 PROCEDURE — G2211 COMPLEX E/M VISIT ADD ON: HCPCS | Performed by: INTERNAL MEDICINE

## 2025-09-05 PROCEDURE — 1111F DSCHRG MED/CURRENT MED MERGE: CPT | Performed by: INTERNAL MEDICINE

## 2025-09-05 PROCEDURE — 3078F DIAST BP <80 MM HG: CPT | Performed by: INTERNAL MEDICINE

## 2025-09-05 PROCEDURE — 1125F AMNT PAIN NOTED PAIN PRSNT: CPT | Performed by: INTERNAL MEDICINE

## 2025-09-05 PROCEDURE — 1159F MED LIST DOCD IN RCRD: CPT | Performed by: INTERNAL MEDICINE

## 2025-09-05 PROCEDURE — 99215 OFFICE O/P EST HI 40 MIN: CPT | Performed by: INTERNAL MEDICINE

## 2025-09-05 PROCEDURE — 99205 OFFICE O/P NEW HI 60 MIN: CPT | Performed by: INTERNAL MEDICINE

## 2025-09-05 PROCEDURE — 3074F SYST BP LT 130 MM HG: CPT | Performed by: INTERNAL MEDICINE

## 2025-09-05 PROCEDURE — 3008F BODY MASS INDEX DOCD: CPT | Performed by: INTERNAL MEDICINE

## 2025-09-05 ASSESSMENT — PAIN SCALES - GENERAL: PAINLEVEL_OUTOF10: 2

## 2025-09-05 ASSESSMENT — ENCOUNTER SYMPTOMS: OCCASIONAL FEELINGS OF UNSTEADINESS: 0

## 2025-12-02 ENCOUNTER — APPOINTMENT (OUTPATIENT)
Dept: NEPHROLOGY | Facility: CLINIC | Age: 70
End: 2025-12-02
Payer: MEDICARE

## (undated) DEVICE — CLEANER, ELECTROSURGICAL, TIP, 5 X 5 CM, LF

## (undated) DEVICE — HOLSTER, ELECTROSURGERY ACCESSORY, STERILE

## (undated) DEVICE — STAPLER,  ENDO ECHELON 60MM RELOAD, GOLD,

## (undated) DEVICE — SUTURE, PROLENE, 2-0, 18 IN, FS, BLUE

## (undated) DEVICE — SUTURE, SILK, 3-0, 18 IN, MULTIPACK, BLACK

## (undated) DEVICE — HANDPIECE, ABC, SINGLE FUNCTION, MALLEABLE, W/CORD & HOLSTER, BEND-A-BEAM, 3 IN, LF

## (undated) DEVICE — DRAPE, FLUID WARMER

## (undated) DEVICE — SUTURE, VICRYL, 3-0, 36 IN, SH DA, VIOLET

## (undated) DEVICE — SUTURE, PROLENE, 5-0, 36 IN, C-1, CV-11, BLUE

## (undated) DEVICE — CLIP, LIGATING, HORIZON, LARGE, TITANIUM

## (undated) DEVICE — SUTURE, SILK, 3-0, 30 IN, SH, CONTROL RELEASE, MULTIPACK, BLACK

## (undated) DEVICE — SUTURE, VICRYL, 4-0, 27 IN, RB-1, VIOLET

## (undated) DEVICE — SUTURE, SILK, 2-0, 30 IN, SH, BLACK

## (undated) DEVICE — DRESSING, NON-ADHERENT, TELFA, OUCHLESS, 3 X 8 IN, STERILE

## (undated) DEVICE — Device

## (undated) DEVICE — CLIP, LIGATING, W/ADHESIVE, WIDE SLOT, SMALL, TITANIUM

## (undated) DEVICE — STAPLER, ECHELON 3000, 60MM COMPACT

## (undated) DEVICE — PAD, GROUNDING, ELECTROSURGICAL, DUAL

## (undated) DEVICE — PITCHER, GRADUATE, 32 OZ (1200CC), STERILE

## (undated) DEVICE — COVER, CART, 45 X 27 X 48 IN, CLEAR

## (undated) DEVICE — SUTURE, SILK, 3-0, 30 IN, MULTIPACK, BLACK

## (undated) DEVICE — DRAPE, SHEET, MINOR PROCEDURE, T, PEDIATRIC, 100X122X77

## (undated) DEVICE — SUTURE, PDSII, 1, TP-1, VIL, MONO, 48LP

## (undated) DEVICE — SUTURE, VICRYL, 5-0, 27 IN, RB-1, VIOLET

## (undated) DEVICE — CLIP, LIGATING, W/ADHESIVE PAD, MEDIUM, TITANIUM

## (undated) DEVICE — STAPLER,  LINEAR RELOAD, 60MM, WHITE, DISP

## (undated) DEVICE — DRESSING, ADHESIVE, ISLAND, TELFA, 2 X 3.75 IN, LF

## (undated) DEVICE — SUTURE, VICRYL, 3-0, 27 IN, SH

## (undated) DEVICE — LOOP, VESSEL, MAXI, RED

## (undated) DEVICE — SUTURE, SILK, 2-0, 18 IN, BLACK

## (undated) DEVICE — SPONGE, DISSECTOR, PEANUT, 3/8, STERILE 5 FOAM HOLDER"

## (undated) DEVICE — CLIPPER, SURGICAL BLADE ASSEMBLY, GENERAL PURPOSE, SINGLE USE

## (undated) DEVICE — GOWN, SURGICAL, SMARTGOWN, XLARGE, STERILE

## (undated) DEVICE — TIP, SUCTION, YANKAUER, BULB, ADULT

## (undated) DEVICE — BAG, DRAINAGE, BILE, W/T-TUBE ADAPTER, W/LATEX BELTS, SMALL, 9 OZ

## (undated) DEVICE — CATHETER TRAY, SURESTEP, 16FR, URINE METER W/STATLOCK

## (undated) DEVICE — DRAIN, WOUND, ROUND, W/TROCAR, HOLE PATTERN, 10 IN, LARGE, 0.25 X 49 IN

## (undated) DEVICE — DRAPE, INSTRUMENT, W/POUCH, STERI DRAPE, 7 X 11 IN, DISPOSABLE, STERILE

## (undated) DEVICE — SUTURE, SILK, 3-0, 18 IN SH/CR, BLACK

## (undated) DEVICE — DRAIN, PENROSE, 0.5 X 12 IN, LATEX, STERILE

## (undated) DEVICE — DRESSING, SPONGE, GAUZE, CURITY, 4 X 4 IN, STERILE

## (undated) DEVICE — RELOAD, ECHELON 60MM, GREY

## (undated) DEVICE — MANIFOLD, 4 PORT NEPTUNE STANDARD

## (undated) DEVICE — DRAPE, MAGENTIC INSTRUMENT, 12X16

## (undated) DEVICE — DRESSING, TRANSPARENT, TEGADERM, 4 X 4-3/4 IN

## (undated) DEVICE — DRAPE, TIBURON W/ADHESIVE, 19 X 30

## (undated) DEVICE — LOOP, VESSEL, MINI, WHITE

## (undated) DEVICE — SUTURE, SILK, 2-0, TIES, 12-30 IN, BLACK

## (undated) DEVICE — COUNTER, NEEDLE, FOAM BLOCK, POP-N-COUNT, W/BLADEGUARD, W/ADHESIVE 40 COUNT, RED